# Patient Record
Sex: MALE | Race: WHITE | URBAN - METROPOLITAN AREA
[De-identification: names, ages, dates, MRNs, and addresses within clinical notes are randomized per-mention and may not be internally consistent; named-entity substitution may affect disease eponyms.]

---

## 2017-05-25 ENCOUNTER — EMERGENCY (EMERGENCY)
Facility: HOSPITAL | Age: 82
LOS: 0 days | Discharge: HOME | End: 2017-05-25
Attending: EMERGENCY MEDICINE | Admitting: EMERGENCY MEDICINE
Payer: MEDICARE

## 2017-05-25 VITALS
WEIGHT: 270.07 LBS | TEMPERATURE: 98 F | HEART RATE: 66 BPM | HEIGHT: 70 IN | SYSTOLIC BLOOD PRESSURE: 158 MMHG | DIASTOLIC BLOOD PRESSURE: 77 MMHG | OXYGEN SATURATION: 98 %

## 2017-05-25 VITALS
OXYGEN SATURATION: 98 % | SYSTOLIC BLOOD PRESSURE: 148 MMHG | RESPIRATION RATE: 17 BRPM | TEMPERATURE: 98 F | HEART RATE: 57 BPM | DIASTOLIC BLOOD PRESSURE: 73 MMHG

## 2017-05-25 DIAGNOSIS — R93.5 ABNORMAL FINDINGS ON DIAGNOSTIC IMAGING OF OTHER ABDOMINAL REGIONS, INCLUDING RETROPERITONEUM: ICD-10-CM

## 2017-05-25 DIAGNOSIS — R91.8 OTHER NONSPECIFIC ABNORMAL FINDING OF LUNG FIELD: ICD-10-CM

## 2017-05-25 DIAGNOSIS — M50.30 OTHER CERVICAL DISC DEGENERATION, UNSPECIFIED CERVICAL REGION: ICD-10-CM
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
## 2017-05-25 DIAGNOSIS — F03.90 UNSPECIFIED DEMENTIA, UNSPECIFIED SEVERITY, WITHOUT BEHAVIORAL DISTURBANCE, PSYCHOTIC DISTURBANCE, MOOD DISTURBANCE, AND ANXIETY: ICD-10-CM

## 2017-05-25 DIAGNOSIS — S20.211A CONTUSION OF RIGHT FRONT WALL OF THORAX, INITIAL ENCOUNTER: ICD-10-CM
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
## 2017-05-25 DIAGNOSIS — M54.9 DORSALGIA, UNSPECIFIED: ICD-10-CM

## 2017-05-25 LAB
ALBUMIN SERPL ELPH-MCNC: 3.7 G/DL — SIGNIFICANT CHANGE UP (ref 3.3–5)
ALP SERPL-CCNC: 51 U/L — SIGNIFICANT CHANGE UP (ref 40–120)
ALT FLD-CCNC: 32 U/L — SIGNIFICANT CHANGE UP (ref 12–78)
ANION GAP SERPL CALC-SCNC: 5 MMOL/L — SIGNIFICANT CHANGE UP (ref 5–17)
APTT BLD: 30.6 SEC — SIGNIFICANT CHANGE UP (ref 27.5–37.4)
AST SERPL-CCNC: 21 U/L — SIGNIFICANT CHANGE UP (ref 15–37)
BASOPHILS # BLD AUTO: 0.1 K/UL — SIGNIFICANT CHANGE UP (ref 0–0.2)
BASOPHILS NFR BLD AUTO: 1.1 % — SIGNIFICANT CHANGE UP (ref 0–2)
BILIRUB SERPL-MCNC: 0.6 MG/DL — SIGNIFICANT CHANGE UP (ref 0.2–1.2)
BUN SERPL-MCNC: 25 MG/DL — HIGH (ref 7–23)
CALCIUM SERPL-MCNC: 8.6 MG/DL — SIGNIFICANT CHANGE UP (ref 8.5–10.1)
CHLORIDE SERPL-SCNC: 106 MMOL/L — SIGNIFICANT CHANGE UP (ref 96–108)
CO2 SERPL-SCNC: 29 MMOL/L — SIGNIFICANT CHANGE UP (ref 22–31)
CREAT SERPL-MCNC: 1.3 MG/DL — SIGNIFICANT CHANGE UP (ref 0.5–1.3)
EOSINOPHIL # BLD AUTO: 0.1 K/UL — SIGNIFICANT CHANGE UP (ref 0–0.5)
EOSINOPHIL NFR BLD AUTO: 1.6 % — SIGNIFICANT CHANGE UP (ref 0–6)
GLUCOSE SERPL-MCNC: 100 MG/DL — HIGH (ref 70–99)
HCT VFR BLD CALC: 41.3 % — SIGNIFICANT CHANGE UP (ref 39–50)
HGB BLD-MCNC: 13.1 G/DL — SIGNIFICANT CHANGE UP (ref 13–17)
INR BLD: 1.07 RATIO — SIGNIFICANT CHANGE UP (ref 0.88–1.16)
LYMPHOCYTES # BLD AUTO: 2.7 K/UL — SIGNIFICANT CHANGE UP (ref 1–3.3)
LYMPHOCYTES # BLD AUTO: 33.9 % — SIGNIFICANT CHANGE UP (ref 13–44)
MCHC RBC-ENTMCNC: 30.2 PG — SIGNIFICANT CHANGE UP (ref 27–34)
MCHC RBC-ENTMCNC: 31.6 GM/DL — LOW (ref 32–36)
MCV RBC AUTO: 95.4 FL — SIGNIFICANT CHANGE UP (ref 80–100)
MONOCYTES # BLD AUTO: 0.8 K/UL — SIGNIFICANT CHANGE UP (ref 0–0.9)
MONOCYTES NFR BLD AUTO: 10.3 % — SIGNIFICANT CHANGE UP (ref 2–14)
NEUTROPHILS # BLD AUTO: 4.3 K/UL — SIGNIFICANT CHANGE UP (ref 1.8–7.4)
NEUTROPHILS NFR BLD AUTO: 53 % — SIGNIFICANT CHANGE UP (ref 43–77)
PLATELET # BLD AUTO: 138 K/UL — LOW (ref 150–400)
POTASSIUM SERPL-MCNC: 4.4 MMOL/L — SIGNIFICANT CHANGE UP (ref 3.5–5.3)
POTASSIUM SERPL-SCNC: 4.4 MMOL/L — SIGNIFICANT CHANGE UP (ref 3.5–5.3)
PROT SERPL-MCNC: 7.4 GM/DL — SIGNIFICANT CHANGE UP (ref 6–8.3)
PROTHROM AB SERPL-ACNC: 11.6 SEC — SIGNIFICANT CHANGE UP (ref 9.8–12.7)
RBC # BLD: 4.33 M/UL — SIGNIFICANT CHANGE UP (ref 4.2–5.8)
RBC # FLD: 13.2 % — SIGNIFICANT CHANGE UP (ref 10.3–14.5)
SODIUM SERPL-SCNC: 140 MMOL/L — SIGNIFICANT CHANGE UP (ref 135–145)
WBC # BLD: 8.1 K/UL — SIGNIFICANT CHANGE UP (ref 3.8–10.5)
WBC # FLD AUTO: 8.1 K/UL — SIGNIFICANT CHANGE UP (ref 3.8–10.5)

## 2017-05-25 PROCEDURE — 71250 CT THORAX DX C-: CPT | Mod: 26

## 2017-05-25 PROCEDURE — 93010 ELECTROCARDIOGRAM REPORT: CPT

## 2017-05-25 PROCEDURE — 72125 CT NECK SPINE W/O DYE: CPT | Mod: 26

## 2017-05-25 PROCEDURE — 70450 CT HEAD/BRAIN W/O DYE: CPT | Mod: 26

## 2017-05-25 PROCEDURE — 74176 CT ABD & PELVIS W/O CONTRAST: CPT | Mod: 26

## 2017-05-25 PROCEDURE — 99284 EMERGENCY DEPT VISIT MOD MDM: CPT | Mod: 25

## 2017-05-25 RX ORDER — KETOROLAC TROMETHAMINE 30 MG/ML
15 SYRINGE (ML) INJECTION ONCE
Qty: 0 | Refills: 0 | Status: DISCONTINUED | OUTPATIENT
Start: 2017-05-25 | End: 2017-05-25

## 2017-05-25 RX ORDER — TRAMADOL HYDROCHLORIDE 50 MG/1
1 TABLET ORAL
Qty: 9 | Refills: 0
Start: 2017-05-25 | End: 2017-05-28

## 2017-05-25 RX ADMIN — Medication 15 MILLIGRAM(S): at 05:30

## 2017-05-25 NOTE — ED PROVIDER NOTE - OBJECTIVE STATEMENT
89 yo male pw right sided lateral chest wall pain worsening with movment pt states he fell 5 days ago and pain is not improving much. he also states he has a ho rib fracture

## 2017-05-25 NOTE — ED PROVIDER NOTE - MUSCULOSKELETAL, MLM
Spine appears normal, range of motion is not limited, no muscle or joint tenderness right lateral mid rib pain, ttp, no deformity, no ecchymosis no swelling, no crepitus

## 2017-10-20 ENCOUNTER — APPOINTMENT (OUTPATIENT)
Dept: CARDIOLOGY | Facility: CLINIC | Age: 82
End: 2017-10-20
Payer: MEDICARE

## 2017-10-20 ENCOUNTER — NON-APPOINTMENT (OUTPATIENT)
Age: 82
End: 2017-10-20

## 2017-10-20 VITALS
OXYGEN SATURATION: 95 % | HEIGHT: 68 IN | DIASTOLIC BLOOD PRESSURE: 80 MMHG | HEART RATE: 60 BPM | BODY MASS INDEX: 41.52 KG/M2 | SYSTOLIC BLOOD PRESSURE: 170 MMHG | TEMPERATURE: 97.6 F | WEIGHT: 274 LBS

## 2017-10-20 DIAGNOSIS — Z87.891 PERSONAL HISTORY OF NICOTINE DEPENDENCE: ICD-10-CM

## 2017-10-20 DIAGNOSIS — Z87.39 PERSONAL HISTORY OF OTHER DISEASES OF THE MUSCULOSKELETAL SYSTEM AND CONNECTIVE TISSUE: ICD-10-CM

## 2017-10-20 DIAGNOSIS — K21.9 GASTRO-ESOPHAGEAL REFLUX DISEASE W/OUT ESOPHAGITIS: ICD-10-CM

## 2017-10-20 DIAGNOSIS — D35.2 BENIGN NEOPLASM OF PITUITARY GLAND: ICD-10-CM

## 2017-10-20 DIAGNOSIS — Z86.39 PERSONAL HISTORY OF OTHER ENDOCRINE, NUTRITIONAL AND METABOLIC DISEASE: ICD-10-CM

## 2017-10-20 DIAGNOSIS — Z87.898 PERSONAL HISTORY OF OTHER SPECIFIED CONDITIONS: ICD-10-CM

## 2017-10-20 PROCEDURE — 99205 OFFICE O/P NEW HI 60 MIN: CPT | Mod: 25

## 2017-10-20 PROCEDURE — 93000 ELECTROCARDIOGRAM COMPLETE: CPT

## 2017-10-20 RX ORDER — CHLORHEXIDINE GLUCONATE 4 %
1000 LIQUID (ML) TOPICAL DAILY
Refills: 0 | Status: ACTIVE | COMMUNITY

## 2017-10-20 RX ORDER — TRAMADOL HYDROCHLORIDE 50 MG/1
50 TABLET, COATED ORAL
Qty: 9 | Refills: 0 | Status: DISCONTINUED | COMMUNITY
Start: 2017-05-25 | End: 2017-10-20

## 2017-10-20 RX ORDER — PANTOPRAZOLE 40 MG/1
40 TABLET, DELAYED RELEASE ORAL DAILY
Qty: 90 | Refills: 1 | Status: ACTIVE | COMMUNITY

## 2017-10-20 RX ORDER — MULTIVIT-MIN/FOLIC/VIT K/LYCOP 400-300MCG
1000 TABLET ORAL DAILY
Refills: 0 | Status: ACTIVE | COMMUNITY

## 2017-10-23 PROBLEM — D35.2 PITUITARY MACROADENOMA: Status: RESOLVED | Noted: 2017-10-23 | Resolved: 2017-10-23

## 2017-10-23 PROBLEM — Z87.898 HISTORY OF MULTIPLE PULMONARY NODULES: Status: RESOLVED | Noted: 2017-10-23 | Resolved: 2017-10-23

## 2017-10-23 PROBLEM — Z87.891 FORMER SMOKER: Status: ACTIVE | Noted: 2017-10-20

## 2017-10-23 RX ORDER — FERROUS SULFATE 325(65) MG
325 (65 FE) TABLET ORAL DAILY
Refills: 0 | Status: ACTIVE | COMMUNITY

## 2018-01-12 ENCOUNTER — NON-APPOINTMENT (OUTPATIENT)
Age: 83
End: 2018-01-12

## 2018-01-12 ENCOUNTER — APPOINTMENT (OUTPATIENT)
Dept: CARDIOLOGY | Facility: CLINIC | Age: 83
End: 2018-01-12
Payer: MEDICARE

## 2018-01-12 VITALS
HEIGHT: 68 IN | TEMPERATURE: 97.9 F | DIASTOLIC BLOOD PRESSURE: 82 MMHG | RESPIRATION RATE: 14 BRPM | SYSTOLIC BLOOD PRESSURE: 150 MMHG | OXYGEN SATURATION: 97 % | HEART RATE: 64 BPM

## 2018-01-12 DIAGNOSIS — Z86.79 PERSONAL HISTORY OF OTHER DISEASES OF THE CIRCULATORY SYSTEM: ICD-10-CM

## 2018-01-12 PROCEDURE — 99215 OFFICE O/P EST HI 40 MIN: CPT | Mod: 25

## 2018-01-12 PROCEDURE — 93000 ELECTROCARDIOGRAM COMPLETE: CPT

## 2018-01-12 RX ORDER — LEVOFLOXACIN 250 MG/1
250 TABLET, FILM COATED ORAL
Qty: 5 | Refills: 0 | Status: COMPLETED | COMMUNITY
Start: 2017-07-18

## 2018-01-12 RX ORDER — ZOLPIDEM TARTRATE 10 MG/1
10 TABLET ORAL
Qty: 30 | Refills: 0 | Status: COMPLETED | COMMUNITY
Start: 2017-07-27

## 2018-01-24 ENCOUNTER — APPOINTMENT (OUTPATIENT)
Dept: CARDIOLOGY | Facility: CLINIC | Age: 83
End: 2018-01-24
Payer: MEDICARE

## 2018-01-24 ENCOUNTER — NON-APPOINTMENT (OUTPATIENT)
Age: 83
End: 2018-01-24

## 2018-01-24 VITALS — OXYGEN SATURATION: 95 % | SYSTOLIC BLOOD PRESSURE: 167 MMHG | DIASTOLIC BLOOD PRESSURE: 83 MMHG | HEART RATE: 67 BPM

## 2018-01-24 VITALS — WEIGHT: 274 LBS | BODY MASS INDEX: 41.52 KG/M2 | HEIGHT: 68 IN

## 2018-01-24 PROCEDURE — 99214 OFFICE O/P EST MOD 30 MIN: CPT | Mod: 25

## 2018-01-24 PROCEDURE — 93000 ELECTROCARDIOGRAM COMPLETE: CPT

## 2018-01-24 PROCEDURE — 93306 TTE W/DOPPLER COMPLETE: CPT

## 2018-01-24 RX ORDER — SIMVASTATIN 40 MG/1
40 TABLET, FILM COATED ORAL DAILY
Qty: 90 | Refills: 3 | Status: ACTIVE | COMMUNITY
Start: 2017-05-09

## 2018-01-24 RX ORDER — LEVOTHYROXINE SODIUM 0.1 MG/1
100 TABLET ORAL DAILY
Qty: 90 | Refills: 3 | Status: ACTIVE | COMMUNITY
Start: 2017-07-05

## 2018-01-26 ENCOUNTER — APPOINTMENT (OUTPATIENT)
Dept: CARDIOLOGY | Facility: CLINIC | Age: 83
End: 2018-01-26

## 2018-01-31 ENCOUNTER — APPOINTMENT (OUTPATIENT)
Dept: CARDIOLOGY | Facility: CLINIC | Age: 83
End: 2018-01-31
Payer: MEDICARE

## 2018-01-31 VITALS
WEIGHT: 275 LBS | HEART RATE: 74 BPM | DIASTOLIC BLOOD PRESSURE: 81 MMHG | SYSTOLIC BLOOD PRESSURE: 152 MMHG | HEIGHT: 68 IN | BODY MASS INDEX: 41.68 KG/M2 | OXYGEN SATURATION: 94 %

## 2018-01-31 PROCEDURE — 99214 OFFICE O/P EST MOD 30 MIN: CPT

## 2018-02-01 ENCOUNTER — APPOINTMENT (OUTPATIENT)
Dept: CARDIOLOGY | Facility: CLINIC | Age: 83
End: 2018-02-01

## 2018-02-12 ENCOUNTER — RX RENEWAL (OUTPATIENT)
Age: 83
End: 2018-02-12

## 2018-02-16 ENCOUNTER — APPOINTMENT (OUTPATIENT)
Dept: CARDIOLOGY | Facility: CLINIC | Age: 83
End: 2018-02-16

## 2018-02-16 ENCOUNTER — APPOINTMENT (OUTPATIENT)
Dept: CARDIOLOGY | Facility: CLINIC | Age: 83
End: 2018-02-16
Payer: MEDICARE

## 2018-02-16 PROCEDURE — 93880 EXTRACRANIAL BILAT STUDY: CPT

## 2018-02-23 NOTE — ED PROVIDER NOTE - FAMILY HISTORY
Anticoagulation Clinic - Remote Progress Note  REMOTE LAB    Indication: afib, s/p PPM  Referring Provider: Cj  Goal INR: 2-3  Initial Warfarin Start: December 2016  Current Drug Interactions: aspirin, citalopram, Synthroid, omeprazole daily, spironolactone, Glucosamine- Chondrotion  CHADS-VASc: 7 (age, gender, HTN, PVD, h/o TIA, HTN)  Bleed Risk: h/o AVM with bleed requiring hospitalization  Other: previously on Xarelto, with subsequent bleed (AVM) -- Dr. Campa in [place]; internal hemorrhoids     Diet: raw cabbage (2-3 heads per week), occasional cooked GLV  Alcohol: none  Tobacco: none  OTC Pain Medication: APAP    INR History:  Date 9/5 9/28 10/12 11/6 11/30 12/28 1/25 2/1 2/9   Total Weekly Dose 32.5 mg 32.5 mg 32.5 mg 32.5 mg 32.5 mg 32.5 mg 32.5mg 37.5 mg 35mg   INR 2.2 2.1 2.6 2.2 3.0 2.6 1.3 1.7 1.7   Notes  clinic; doxy clinic clinic clinic clinic clinic; cefdinir clinic; cefdinir cefdinir completed     Date 2/16 2/23          Total Weekly Dose 42.5 42.5          INR 1.8 2.3          Notes                Phone Interview:  Tablet Strength: pt has 5mg tablets  Current Maintenance Dose: 5mg daily   Patient Findings      Negatives Signs/symptoms of thrombosis, Signs/symptoms of bleeding, Laboratory test error suspected, Change in health, Change in alcohol use, Change in activity, Upcoming invasive procedure, Emergency department visit, Upcoming dental procedure, Missed doses, Extra doses, Change in medications, Change in diet/appetite, Hospital admission, Bruising, Other complaints     Comments Patient wrote down her diet this past week. She liked doing this and plans to continue. She realized she has been eating a lot of carbs, being a diabetic; she would prefer to incorporate more vegetables into her diet. She would like to start eating green beans, peas, coleslaw (low vit K foods). Provided her GLV information sheet at last encounter.     Patient will have 2 crowns placed at the dentist on  "2/26 and will take 4 clindamycin capsules prior to the procedure. Do not need to hold warfarin per DMD. Discussed can wet tea bag and place if notice bleeding after procedure.     Patient Contact Info: [phone]  Lab Contact Info: formerly [facility], [phone]    Plan:  1. INR remains subtherapeutic today at 1.8 despite boost last week. Since patient has had multiple subtherapeutic INRs recently, instructed her to increase her warfarin to 7.5mg on Mon/Fri, 5mg every other day. Will also \"boost\" dose on 2/17 to 7.5mg.    2. RTC in 1 week, 3/2 to ensure WNL. If still therapeutic- will consider pushing appt out to [id number] then once monthly.  3. Verbal and written information provided in the clinic. Ms. Cross RBV dosing instructions, expresses understanding with teach back, and she has no further questions at this time.    Jasmine Turner, PharmD  2/23/2018  1:46 PM    " No pertinent family history in first degree relatives

## 2018-03-14 ENCOUNTER — APPOINTMENT (OUTPATIENT)
Dept: CARDIOLOGY | Facility: CLINIC | Age: 83
End: 2018-03-14

## 2018-03-19 ENCOUNTER — MEDICATION RENEWAL (OUTPATIENT)
Age: 83
End: 2018-03-19

## 2018-03-20 ENCOUNTER — MEDICATION RENEWAL (OUTPATIENT)
Age: 83
End: 2018-03-20

## 2018-03-22 ENCOUNTER — APPOINTMENT (OUTPATIENT)
Dept: CARDIOLOGY | Facility: CLINIC | Age: 83
End: 2018-03-22
Payer: MEDICARE

## 2018-03-22 ENCOUNTER — NON-APPOINTMENT (OUTPATIENT)
Age: 83
End: 2018-03-22

## 2018-03-22 VITALS
TEMPERATURE: 97.7 F | OXYGEN SATURATION: 96 % | RESPIRATION RATE: 14 BRPM | HEIGHT: 68 IN | BODY MASS INDEX: 42.28 KG/M2 | WEIGHT: 279 LBS | HEART RATE: 72 BPM

## 2018-03-22 VITALS — DIASTOLIC BLOOD PRESSURE: 68 MMHG | SYSTOLIC BLOOD PRESSURE: 110 MMHG

## 2018-03-22 PROCEDURE — 93000 ELECTROCARDIOGRAM COMPLETE: CPT

## 2018-03-22 PROCEDURE — 99214 OFFICE O/P EST MOD 30 MIN: CPT | Mod: 25

## 2018-04-16 ENCOUNTER — RX RENEWAL (OUTPATIENT)
Age: 83
End: 2018-04-16

## 2018-07-13 ENCOUNTER — APPOINTMENT (OUTPATIENT)
Dept: CARDIOLOGY | Facility: CLINIC | Age: 83
End: 2018-07-13
Payer: MEDICARE

## 2018-07-13 ENCOUNTER — NON-APPOINTMENT (OUTPATIENT)
Age: 83
End: 2018-07-13

## 2018-07-13 VITALS — OXYGEN SATURATION: 93 % | HEART RATE: 65 BPM | SYSTOLIC BLOOD PRESSURE: 187 MMHG | DIASTOLIC BLOOD PRESSURE: 82 MMHG

## 2018-07-13 VITALS — HEIGHT: 68 IN | BODY MASS INDEX: 42.28 KG/M2 | WEIGHT: 279 LBS

## 2018-07-13 PROCEDURE — 99214 OFFICE O/P EST MOD 30 MIN: CPT | Mod: 25

## 2018-07-13 PROCEDURE — 93000 ELECTROCARDIOGRAM COMPLETE: CPT

## 2018-07-13 RX ORDER — MULTIVIT-MIN/FOLIC/VIT K/LYCOP 400-300MCG
50 MCG TABLET ORAL
Qty: 90 | Refills: 3 | Status: ACTIVE | COMMUNITY

## 2018-07-13 RX ORDER — ASPIRIN 325 MG/1
325 TABLET, FILM COATED ORAL DAILY
Qty: 60 | Refills: 1 | Status: ACTIVE | COMMUNITY

## 2018-07-16 ENCOUNTER — APPOINTMENT (OUTPATIENT)
Dept: RHEUMATOLOGY | Facility: CLINIC | Age: 83
End: 2018-07-16

## 2018-07-16 LAB
25(OH)D3 SERPL-MCNC: 39.2 NG/ML
ALBUMIN SERPL ELPH-MCNC: 4.6 G/DL
ALP BLD-CCNC: 41 U/L
ALT SERPL-CCNC: 39 U/L
ANION GAP SERPL CALC-SCNC: 14 MMOL/L
AST SERPL-CCNC: 40 U/L
BASOPHILS # BLD AUTO: 0.04 K/UL
BASOPHILS NFR BLD AUTO: 0.6 %
BILIRUB SERPL-MCNC: 0.5 MG/DL
BUN SERPL-MCNC: 29 MG/DL
CALCIUM SERPL-MCNC: 9.6 MG/DL
CHLORIDE SERPL-SCNC: 101 MMOL/L
CK SERPL-CCNC: 124 U/L
CO2 SERPL-SCNC: 24 MMOL/L
CREAT SERPL-MCNC: 1.17 MG/DL
EOSINOPHIL # BLD AUTO: 0.11 K/UL
EOSINOPHIL NFR BLD AUTO: 1.6 %
GLUCOSE SERPL-MCNC: 111 MG/DL
HBA1C MFR BLD HPLC: 5.8 %
HCT VFR BLD CALC: 39.7 %
HGB BLD-MCNC: 13.1 G/DL
IMM GRANULOCYTES NFR BLD AUTO: 0.1 %
LYMPHOCYTES # BLD AUTO: 1.86 K/UL
LYMPHOCYTES NFR BLD AUTO: 27.3 %
MAGNESIUM SERPL-MCNC: 2.3 MG/DL
MAN DIFF?: NORMAL
MCHC RBC-ENTMCNC: 30.1 PG
MCHC RBC-ENTMCNC: 33 GM/DL
MCV RBC AUTO: 91.3 FL
MONOCYTES # BLD AUTO: 0.67 K/UL
MONOCYTES NFR BLD AUTO: 9.8 %
NEUTROPHILS # BLD AUTO: 4.13 K/UL
NEUTROPHILS NFR BLD AUTO: 60.6 %
PLATELET # BLD AUTO: 150 K/UL
POTASSIUM SERPL-SCNC: 4.6 MMOL/L
PROT SERPL-MCNC: 8.1 G/DL
RBC # BLD: 4.35 M/UL
RBC # FLD: 14.1 %
SODIUM SERPL-SCNC: 139 MMOL/L
T3 SERPL-MCNC: 86 NG/DL
T4 SERPL-MCNC: 6.6 UG/DL
TSH SERPL-ACNC: 0.4 UIU/ML
WBC # FLD AUTO: 6.82 K/UL

## 2018-07-20 ENCOUNTER — APPOINTMENT (OUTPATIENT)
Dept: CARDIOLOGY | Facility: CLINIC | Age: 83
End: 2018-07-20
Payer: MEDICARE

## 2018-07-20 VITALS
WEIGHT: 276 LBS | OXYGEN SATURATION: 94 % | HEART RATE: 66 BPM | DIASTOLIC BLOOD PRESSURE: 75 MMHG | SYSTOLIC BLOOD PRESSURE: 160 MMHG | BODY MASS INDEX: 41.83 KG/M2 | HEIGHT: 68 IN

## 2018-07-20 PROBLEM — F03.90 UNSPECIFIED DEMENTIA WITHOUT BEHAVIORAL DISTURBANCE: Chronic | Status: ACTIVE | Noted: 2017-06-15

## 2018-07-20 PROCEDURE — 93000 ELECTROCARDIOGRAM COMPLETE: CPT

## 2018-07-20 PROCEDURE — 99214 OFFICE O/P EST MOD 30 MIN: CPT | Mod: 25

## 2018-08-20 ENCOUNTER — APPOINTMENT (OUTPATIENT)
Dept: CARDIOLOGY | Facility: CLINIC | Age: 83
End: 2018-08-20
Payer: MEDICARE

## 2018-08-20 ENCOUNTER — FORM ENCOUNTER (OUTPATIENT)
Age: 83
End: 2018-08-20

## 2018-08-20 VITALS
SYSTOLIC BLOOD PRESSURE: 128 MMHG | BODY MASS INDEX: 41.22 KG/M2 | WEIGHT: 272 LBS | HEIGHT: 68 IN | OXYGEN SATURATION: 92 % | HEART RATE: 67 BPM | DIASTOLIC BLOOD PRESSURE: 78 MMHG

## 2018-08-20 PROCEDURE — 99214 OFFICE O/P EST MOD 30 MIN: CPT

## 2018-08-21 ENCOUNTER — OUTPATIENT (OUTPATIENT)
Dept: OUTPATIENT SERVICES | Facility: HOSPITAL | Age: 83
LOS: 1 days | End: 2018-08-21
Payer: MEDICARE

## 2018-08-21 ENCOUNTER — APPOINTMENT (OUTPATIENT)
Dept: RADIOLOGY | Facility: CLINIC | Age: 83
End: 2018-08-21
Payer: MEDICARE

## 2018-08-21 DIAGNOSIS — Z00.8 ENCOUNTER FOR OTHER GENERAL EXAMINATION: ICD-10-CM

## 2018-08-21 PROCEDURE — 71046 X-RAY EXAM CHEST 2 VIEWS: CPT | Mod: 26

## 2018-08-21 PROCEDURE — 71046 X-RAY EXAM CHEST 2 VIEWS: CPT

## 2018-10-08 NOTE — ED PROVIDER NOTE - SKIN, MLM
Is This A New Presentation, Or A Follow-Up?: Skin Lesions How Severe Is Your Skin Lesion?: mild Have Your Skin Lesions Been Treated?: not been treated Skin normal color for race, warm, dry and intact. No evidence of rash.

## 2018-11-18 ENCOUNTER — RX RENEWAL (OUTPATIENT)
Age: 83
End: 2018-11-18

## 2019-07-17 ENCOUNTER — APPOINTMENT (OUTPATIENT)
Dept: CARDIOLOGY | Facility: CLINIC | Age: 84
End: 2019-07-17
Payer: MEDICARE

## 2019-07-17 ENCOUNTER — NON-APPOINTMENT (OUTPATIENT)
Age: 84
End: 2019-07-17

## 2019-07-17 VITALS
HEART RATE: 61 BPM | SYSTOLIC BLOOD PRESSURE: 137 MMHG | DIASTOLIC BLOOD PRESSURE: 87 MMHG | HEIGHT: 68 IN | WEIGHT: 263 LBS | BODY MASS INDEX: 39.86 KG/M2 | OXYGEN SATURATION: 93 %

## 2019-07-17 DIAGNOSIS — R06.09 OTHER FORMS OF DYSPNEA: ICD-10-CM

## 2019-07-17 PROCEDURE — 99214 OFFICE O/P EST MOD 30 MIN: CPT | Mod: 25

## 2019-07-17 PROCEDURE — 93000 ELECTROCARDIOGRAM COMPLETE: CPT

## 2019-07-17 RX ORDER — PREDNISONE 5 MG/1
5 TABLET ORAL
Qty: 90 | Refills: 3 | Status: ACTIVE | COMMUNITY

## 2019-07-25 ENCOUNTER — OTHER (OUTPATIENT)
Age: 84
End: 2019-07-25

## 2019-07-25 PROBLEM — R06.09 DOE (DYSPNEA ON EXERTION): Status: RESOLVED | Noted: 2017-10-20 | Resolved: 2018-03-22

## 2019-07-25 RX ORDER — RAMIPRIL 5 MG/1
5 CAPSULE ORAL
Refills: 0 | Status: DISCONTINUED | COMMUNITY
Start: 2017-04-30 | End: 2019-07-25

## 2019-07-25 NOTE — DISCUSSION/SUMMARY
[FreeTextEntry1] : Dizziness: Likely c/w vertigo and will refer to ENT for this. Less likely is side effect of increase ramapril. After ENT evaluation will reassess. \par HTN: Well controlled on current regime of medication. Low Sodium diet discussed.\par IGLESIAS: Combination of physical inactivity and DD.  Discussed increased activity to combat this and he will try.\par HL: Recent blood work great. \par Follow up 6 months

## 2019-07-25 NOTE — PHYSICAL EXAM
[General Appearance - Well Developed] : well developed [Normal Appearance] : normal appearance [General Appearance - Well Nourished] : well nourished [General Appearance - In No Acute Distress] : no acute distress [Normal Conjunctiva] : the conjunctiva exhibited no abnormalities [Eyelids - No Xanthelasma] : the eyelids demonstrated no xanthelasmas [Normal Oral Mucosa] : normal oral mucosa [No Oral Pallor] : no oral pallor [Normal Oropharynx] : normal oropharynx [Normal Jugular Venous A Waves Present] : normal jugular venous A waves present [Normal Jugular Venous V Waves Present] : normal jugular venous V waves present [Exaggerated Use Of Accessory Muscles For Inspiration] : no accessory muscle use [Auscultation Breath Sounds / Voice Sounds] : lungs were clear to auscultation bilaterally [Abdomen Soft] : soft [Abdomen Tenderness] : non-tender [Abdomen Mass (___ Cm)] : no abdominal mass palpated [Abnormal Walk] : normal gait [Nail Clubbing] : no clubbing of the fingernails [Cyanosis, Localized] : no localized cyanosis [Petechial Hemorrhages (___cm)] : no petechial hemorrhages [] : no rash [No Venous Stasis] : no venous stasis [Skin Lesions] : no skin lesions [Oriented To Time, Place, And Person] : oriented to person, place, and time [Impaired Insight] : insight and judgment were intact [Affect] : the affect was normal [Mood] : the mood was normal [Normal Rate] : normal [Rhythm Regular] : regular [Normal S1] : normal S1 [Normal S2] : normal S2 [S3] : no S3 [S4] : an S4 was heard [I] : a grade 1 [Right Carotid Bruit] : no bruit heard over the right carotid [Left Carotid Bruit] : no bruit heard over the left carotid [2+] : left 2+ [Right Femoral Bruit] : no bruit heard over the right femoral artery [Left Femoral Bruit] : no bruit heard over the left femoral artery [1+] : left 1+ [No Abnormalities] : the abdominal aorta was not enlarged and no bruit was heard [___ +] : bilateral [unfilled]U+ pretibial pitting edema

## 2019-07-25 NOTE — HISTORY OF PRESENT ILLNESS
[FreeTextEntry1] : c/o of dizziness with vertiginous characteristic. Denies chest pain, shortness of breath, palpitations, orthopnea, paroxysmal nocturnal dyspnea, claudication, or syncopal symptoms. Reviewed recent labs and results good.\par

## 2019-07-25 NOTE — REVIEW OF SYSTEMS
[Feeling Fatigued] : feeling fatigued [Blurry Vision] : blurred vision [Eyeglasses] : currently wearing eyeglasses [see HPI] : see HPI [Joint Pain] : joint pain [Joint Stiffness] : joint stiffness [Negative] : Heme/Lymph

## 2019-08-08 ENCOUNTER — APPOINTMENT (OUTPATIENT)
Dept: CARDIOLOGY | Facility: CLINIC | Age: 84
End: 2019-08-08
Payer: MEDICARE

## 2019-08-08 ENCOUNTER — NON-APPOINTMENT (OUTPATIENT)
Age: 84
End: 2019-08-08

## 2019-08-08 VITALS
BODY MASS INDEX: 39.86 KG/M2 | HEIGHT: 68 IN | WEIGHT: 263 LBS | SYSTOLIC BLOOD PRESSURE: 170 MMHG | RESPIRATION RATE: 15 BRPM | OXYGEN SATURATION: 93 % | HEART RATE: 66 BPM | DIASTOLIC BLOOD PRESSURE: 80 MMHG

## 2019-08-08 PROCEDURE — 93000 ELECTROCARDIOGRAM COMPLETE: CPT

## 2019-08-08 PROCEDURE — 99214 OFFICE O/P EST MOD 30 MIN: CPT | Mod: 25

## 2019-08-08 NOTE — REASON FOR VISIT
[Follow-Up - Clinic] : a clinic follow-up of [Dizziness] : dizziness [Dyspnea] : dyspnea [Medication Management] : Medication management [Hypertension] : hypertension

## 2019-08-09 RX ORDER — RAMIPRIL 2.5 MG/1
2.5 CAPSULE ORAL
Qty: 90 | Refills: 1 | Status: DISCONTINUED | COMMUNITY
Start: 2019-04-14 | End: 2019-08-09

## 2019-08-09 NOTE — HISTORY OF PRESENT ILLNESS
[FreeTextEntry1] : This is a 89 Y/O gentleman PMH: HTN, HLD, CAD/Stent 2011 ( [facility] ), Colon cancer, Hypothyroid, GERD, PMR followed with Rheumatology who presets today with CC of dizziness stating he feels his head spinning these symptoms occur randomly W/O any triggering/alleviating factors he denies CP/SOB/Palpitations/Syncope.  There is no evidence of orthostatic blood pressures upon exam today.  Does state at times symptoms are worse in the morning self limiting and usually kenia spontaneously.  \par \par Prior Ct Scan reviewed and I Have again today as on prior OV to F/U with  Neuro/Pulmonary W/U Prior CT  He had previously stated will he follow with PCP with regards to this However, has not done so as of yet.

## 2019-08-09 NOTE — PHYSICAL EXAM
[General Appearance - Well Developed] : well developed [Normal Appearance] : normal appearance [Well Groomed] : well groomed [No Deformities] : no deformities [General Appearance - Well Nourished] : well nourished [General Appearance - In No Acute Distress] : no acute distress [Normal Conjunctiva] : the conjunctiva exhibited no abnormalities [] : no respiratory distress [Exaggerated Use Of Accessory Muscles For Inspiration] : no accessory muscle use [Respiration, Rhythm And Depth] : normal respiratory rhythm and effort [Auscultation Breath Sounds / Voice Sounds] : lungs were clear to auscultation bilaterally [Heart Sounds] : normal S1 and S2 [Heart Rate And Rhythm] : heart rate and rhythm were normal [Abdomen Soft] : soft [Skin Color & Pigmentation] : normal skin color and pigmentation [Oriented To Time, Place, And Person] : oriented to person, place, and time [FreeTextEntry1] : No LE Edema

## 2019-08-09 NOTE — DISCUSSION/SUMMARY
[FreeTextEntry1] : CC dizziness W/O associated cardiac symptoms, no orthostatic blood pressures upon exam today,   Prior carotid US revealed no Hemodynamically significant  stenosis.  At this time will refer to Neurology for further W/U.  Have placed 24 HR Holter monitor,  Recent labs reviewed advised to increase PO fluids,\par \par Will suspend Ramipril and begin Norvasc 2.5 MG QD.  OV one week \par \par Will follow after Neuro Eval

## 2019-08-15 ENCOUNTER — NON-APPOINTMENT (OUTPATIENT)
Age: 84
End: 2019-08-15

## 2019-08-15 ENCOUNTER — APPOINTMENT (OUTPATIENT)
Dept: CARDIOLOGY | Facility: CLINIC | Age: 84
End: 2019-08-15
Payer: MEDICARE

## 2019-08-15 VITALS
WEIGHT: 263 LBS | TEMPERATURE: 97.9 F | DIASTOLIC BLOOD PRESSURE: 72 MMHG | RESPIRATION RATE: 14 BRPM | HEIGHT: 68 IN | BODY MASS INDEX: 39.86 KG/M2 | SYSTOLIC BLOOD PRESSURE: 150 MMHG | OXYGEN SATURATION: 94 % | HEART RATE: 68 BPM

## 2019-08-15 DIAGNOSIS — Z86.79 PERSONAL HISTORY OF OTHER DISEASES OF THE CIRCULATORY SYSTEM: ICD-10-CM

## 2019-08-15 DIAGNOSIS — I10 ESSENTIAL (PRIMARY) HYPERTENSION: ICD-10-CM

## 2019-08-15 DIAGNOSIS — Z86.39 PERSONAL HISTORY OF OTHER ENDOCRINE, NUTRITIONAL AND METABOLIC DISEASE: ICD-10-CM

## 2019-08-15 DIAGNOSIS — H81.10 BENIGN PAROXYSMAL VERTIGO, UNSPECIFIED EAR: ICD-10-CM

## 2019-08-15 PROCEDURE — 93000 ELECTROCARDIOGRAM COMPLETE: CPT

## 2019-08-15 PROCEDURE — 99214 OFFICE O/P EST MOD 30 MIN: CPT | Mod: 25

## 2019-08-15 NOTE — REASON FOR VISIT
[Follow-Up - Clinic] : a clinic follow-up of [Dizziness] : dizziness [Dyspnea] : dyspnea [Hypertension] : hypertension [Medication Management] : Medication management

## 2019-08-16 NOTE — HISTORY OF PRESENT ILLNESS
[FreeTextEntry1] : This is a 89 Y/O gentleman PMH: HTN, HLD, CAD/Stent 2011 ( [facility] ), Colon cancer, Hypothyroid, GERD, PMR followed with Rheumatology who presets today in follow up of dizziness. Ramipril was suspended Norvasc initiated dizziness abated pressure normotensive and he is tolerating Norvasc well.  \par \par All recent testing and Prior Ct Scan reviewed and  discussed with patient and family member which both patient and family member do not wish any further work up or treatment at this time. \par

## 2019-08-16 NOTE — PHYSICAL EXAM
[General Appearance - Well Developed] : well developed [Normal Appearance] : normal appearance [Well Groomed] : well groomed [General Appearance - Well Nourished] : well nourished [No Deformities] : no deformities [General Appearance - In No Acute Distress] : no acute distress [Normal Conjunctiva] : the conjunctiva exhibited no abnormalities [] : no respiratory distress [Respiration, Rhythm And Depth] : normal respiratory rhythm and effort [Exaggerated Use Of Accessory Muscles For Inspiration] : no accessory muscle use [Auscultation Breath Sounds / Voice Sounds] : lungs were clear to auscultation bilaterally [Heart Rate And Rhythm] : heart rate and rhythm were normal [Heart Sounds] : normal S1 and S2 [Abdomen Soft] : soft [Skin Color & Pigmentation] : normal skin color and pigmentation [Oriented To Time, Place, And Person] : oriented to person, place, and time [FreeTextEntry1] : No LE Edema

## 2019-08-16 NOTE — DISCUSSION/SUMMARY
[FreeTextEntry1] : Dizziness: Improved off ramipril.  Doing well with the addition of Norvasc.  No further changes at this time \par \par HTN: Controlled \par \par HLD: Will continue current medications and continue to monitor labs while on statin and Norvasc

## 2019-09-03 ENCOUNTER — RX CHANGE (OUTPATIENT)
Age: 84
End: 2019-09-03

## 2019-09-30 ENCOUNTER — MEDICATION RENEWAL (OUTPATIENT)
Age: 84
End: 2019-09-30

## 2019-09-30 ENCOUNTER — RX RENEWAL (OUTPATIENT)
Age: 84
End: 2019-09-30

## 2019-10-17 ENCOUNTER — EMERGENCY (EMERGENCY)
Facility: HOSPITAL | Age: 84
LOS: 0 days | Discharge: HOME | End: 2019-10-17
Attending: EMERGENCY MEDICINE
Payer: MEDICARE

## 2019-10-17 VITALS
TEMPERATURE: 97 F | SYSTOLIC BLOOD PRESSURE: 140 MMHG | OXYGEN SATURATION: 95 % | HEART RATE: 85 BPM | DIASTOLIC BLOOD PRESSURE: 73 MMHG | RESPIRATION RATE: 17 BRPM

## 2019-10-17 VITALS — HEIGHT: 70 IN | WEIGHT: 259.93 LBS

## 2019-10-17 DIAGNOSIS — E78.5 HYPERLIPIDEMIA, UNSPECIFIED: ICD-10-CM

## 2019-10-17 DIAGNOSIS — R53.83 OTHER FATIGUE: ICD-10-CM

## 2019-10-17 DIAGNOSIS — R05 COUGH: ICD-10-CM

## 2019-10-17 DIAGNOSIS — R53.81 OTHER MALAISE: ICD-10-CM

## 2019-10-17 DIAGNOSIS — R53.1 WEAKNESS: ICD-10-CM

## 2019-10-17 DIAGNOSIS — I10 ESSENTIAL (PRIMARY) HYPERTENSION: ICD-10-CM

## 2019-10-17 DIAGNOSIS — F03.90 UNSPECIFIED DEMENTIA WITHOUT BEHAVIORAL DISTURBANCE: ICD-10-CM

## 2019-10-17 LAB
ADD ON TEST-SPECIMEN IN LAB: SIGNIFICANT CHANGE UP
APPEARANCE UR: CLEAR — SIGNIFICANT CHANGE UP
BASOPHILS # BLD AUTO: 0.06 K/UL — SIGNIFICANT CHANGE UP (ref 0–0.2)
BASOPHILS NFR BLD AUTO: 0.6 % — SIGNIFICANT CHANGE UP (ref 0–2)
BILIRUB UR-MCNC: NEGATIVE — SIGNIFICANT CHANGE UP
COLOR SPEC: YELLOW — SIGNIFICANT CHANGE UP
DIFF PNL FLD: ABNORMAL
EOSINOPHIL # BLD AUTO: 0.14 K/UL — SIGNIFICANT CHANGE UP (ref 0–0.5)
EOSINOPHIL NFR BLD AUTO: 1.5 % — SIGNIFICANT CHANGE UP (ref 0–6)
GLUCOSE UR QL: NEGATIVE MG/DL — SIGNIFICANT CHANGE UP
HCT VFR BLD CALC: 41 % — SIGNIFICANT CHANGE UP (ref 39–50)
HGB BLD-MCNC: 13.3 G/DL — SIGNIFICANT CHANGE UP (ref 13–17)
IMM GRANULOCYTES NFR BLD AUTO: 0.4 % — SIGNIFICANT CHANGE UP (ref 0–1.5)
KETONES UR-MCNC: NEGATIVE — SIGNIFICANT CHANGE UP
LEUKOCYTE ESTERASE UR-ACNC: ABNORMAL
LYMPHOCYTES # BLD AUTO: 1.77 K/UL — SIGNIFICANT CHANGE UP (ref 1–3.3)
LYMPHOCYTES # BLD AUTO: 18.6 % — SIGNIFICANT CHANGE UP (ref 13–44)
MCHC RBC-ENTMCNC: 29.6 PG — SIGNIFICANT CHANGE UP (ref 27–34)
MCHC RBC-ENTMCNC: 32.4 GM/DL — SIGNIFICANT CHANGE UP (ref 32–36)
MCV RBC AUTO: 91.3 FL — SIGNIFICANT CHANGE UP (ref 80–100)
MONOCYTES # BLD AUTO: 0.99 K/UL — HIGH (ref 0–0.9)
MONOCYTES NFR BLD AUTO: 10.4 % — SIGNIFICANT CHANGE UP (ref 2–14)
NEUTROPHILS # BLD AUTO: 6.54 K/UL — SIGNIFICANT CHANGE UP (ref 1.8–7.4)
NEUTROPHILS NFR BLD AUTO: 68.5 % — SIGNIFICANT CHANGE UP (ref 43–77)
NITRITE UR-MCNC: NEGATIVE — SIGNIFICANT CHANGE UP
PH UR: 6.5 — SIGNIFICANT CHANGE UP (ref 5–8)
PLATELET # BLD AUTO: 209 K/UL — SIGNIFICANT CHANGE UP (ref 150–400)
PROT UR-MCNC: NEGATIVE MG/DL — SIGNIFICANT CHANGE UP
RBC # BLD: 4.49 M/UL — SIGNIFICANT CHANGE UP (ref 4.2–5.8)
RBC # FLD: 13.1 % — SIGNIFICANT CHANGE UP (ref 10.3–14.5)
SP GR SPEC: 1.01 — SIGNIFICANT CHANGE UP (ref 1.01–1.02)
UROBILINOGEN FLD QL: 1 MG/DL
WBC # BLD: 9.54 K/UL — SIGNIFICANT CHANGE UP (ref 3.8–10.5)
WBC # FLD AUTO: 9.54 K/UL — SIGNIFICANT CHANGE UP (ref 3.8–10.5)

## 2019-10-17 PROCEDURE — 84484 ASSAY OF TROPONIN QUANT: CPT

## 2019-10-17 PROCEDURE — 93005 ELECTROCARDIOGRAM TRACING: CPT

## 2019-10-17 PROCEDURE — 87086 URINE CULTURE/COLONY COUNT: CPT

## 2019-10-17 PROCEDURE — 36415 COLL VENOUS BLD VENIPUNCTURE: CPT

## 2019-10-17 PROCEDURE — 80053 COMPREHEN METABOLIC PANEL: CPT

## 2019-10-17 PROCEDURE — 99285 EMERGENCY DEPT VISIT HI MDM: CPT | Mod: 25

## 2019-10-17 PROCEDURE — 71046 X-RAY EXAM CHEST 2 VIEWS: CPT | Mod: 26

## 2019-10-17 PROCEDURE — 93010 ELECTROCARDIOGRAM REPORT: CPT

## 2019-10-17 PROCEDURE — 71046 X-RAY EXAM CHEST 2 VIEWS: CPT

## 2019-10-17 PROCEDURE — 83735 ASSAY OF MAGNESIUM: CPT

## 2019-10-17 PROCEDURE — 84100 ASSAY OF PHOSPHORUS: CPT

## 2019-10-17 PROCEDURE — 81001 URINALYSIS AUTO W/SCOPE: CPT

## 2019-10-17 PROCEDURE — 85025 COMPLETE CBC W/AUTO DIFF WBC: CPT

## 2019-10-17 PROCEDURE — 99285 EMERGENCY DEPT VISIT HI MDM: CPT

## 2019-10-17 RX ORDER — SODIUM CHLORIDE 9 MG/ML
1000 INJECTION INTRAMUSCULAR; INTRAVENOUS; SUBCUTANEOUS ONCE
Refills: 0 | Status: COMPLETED | OUTPATIENT
Start: 2019-10-17 | End: 2019-10-17

## 2019-10-17 RX ADMIN — SODIUM CHLORIDE 2000 MILLILITER(S): 9 INJECTION INTRAMUSCULAR; INTRAVENOUS; SUBCUTANEOUS at 11:33

## 2019-10-17 NOTE — ED PROVIDER NOTE - OBJECTIVE STATEMENT
89 y/o male with PMHx of HTN, HLD, and Dementia presents to the ED BIB daughter c/o weakness and fatigue x 1.5 week. Pt states he has been having "violent" cough x 1 week. As per daughter at bedside, pt is moving more slowly and not per baseline. Pt was seen in  2 days ago and had a CXR and a flu test but the results were negative and pt was put on Erythromycin. Denies fever, chest pain, diarrhea, burning urination, headache, or changes in speech. Pt traveled to 3 different states within the last month. Pt ambulates with a cane. Allergic to Penicillin. PCP: Dr. Tony Goldsmith. Full HPI is unobtainable due to a pt being a poor historian.

## 2019-10-17 NOTE — ED PROVIDER NOTE - INTERPRETATION
normal sinus rhythm, Normal axis, Normal OH interval and QRS complex. There are no acute ischemic ST or T-wave changes./sinus rhythm with 1st degree AV block

## 2019-10-17 NOTE — ED ADULT NURSE NOTE - NSIMPLEMENTINTERV_GEN_ALL_ED
Implemented All Universal Safety Interventions:  [employer] to call system. Call bell, personal items and telephone within reach. Instruct patient to call for assistance. Room bathroom lighting operational. Non-slip footwear when patient is off stretcher. Physically safe environment: no spills, clutter or unnecessary equipment. Stretcher in lowest position, wheels locked, appropriate side rails in place.

## 2019-10-17 NOTE — ED PROVIDER NOTE - CARE PLAN
Principal Discharge DX:	Weakness  Secondary Diagnosis:	Fatigue  Secondary Diagnosis:	Malaise and fatigue  Secondary Diagnosis:	Cough

## 2019-10-17 NOTE — ED STATDOCS - NS_ ATTENDINGSCRIBEDETAILS _ED_A_ED_FT
I, Lavon Torres MD, performed the initial face to face bedside interview with this patient regarding history of present illness and determined that the patient should be seen in the main ED.  The history, was documented by the scribe in my presence and I attest to the accuracy of the documentation.

## 2019-10-17 NOTE — ED STATDOCS - PROGRESS NOTE DETAILS
[facility] for ED attending, Dr. Torres: 89 y/o male with PMHx of HTN, HLD, and dementia presents to the ED c/o +weakness and +fatigue x weeks. 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 But within the last two weeks he has been too weak to even get up out of bed. Pt also notes +cough. Denies CP or SOB. Allergic to penicillin. PCP: Dr. Tony Goldsmith. Will send pt to main ED for further evaluation.

## 2019-10-17 NOTE — ED PROVIDER NOTE - PATIENT PORTAL LINK FT
You can access the FollowMyHealth Patient Portal offered by [facility] by registering at the following website: [email or web address]. By joining [employer]’s FollowMyHealth portal, you will also be able to view your health information using other applications (apps) compatible with our system.

## 2019-10-17 NOTE — ED PROVIDER NOTE - PROGRESS NOTE DETAILS
pt known to me as an outpt as well.  c/o generalized fatigue, weakness x 1.5 weeks after a bout of frequent travel and URI/cough.  feels much better after 1L NS.  labs essentially normal.  was started on zpack 2d ago in UC for cough, which possibly has partially treated a UTI based on results?? will send UCx.  pt would like to go home, daughter here with him agrees that we can dc home since he is feeling better.  strict return precautions discussed at length and they can return to see me here in ED tomorrow should anything persist or worsen.  pt is freely ambulating around ED and would like to go home. will dc.  finish abx.

## 2019-10-17 NOTE — ED ADULT TRIAGE NOTE - CHIEF COMPLAINT QUOTE
Pt reports worsening fatigue over past week. Denies acute injury, pain, or other symptoms at this time. No focal weakness.

## 2019-10-17 NOTE — ED ADULT NURSE NOTE - OBJECTIVE STATEMENT
Patient brought in by daughter for weakness and fatigue for 1 and a half weeks. Patient also reports cough for 1 week Daughter concerned for dehydration. Patient was seen at urgent care and was given PO abx for cough.

## 2019-10-18 LAB
CULTURE RESULTS: SIGNIFICANT CHANGE UP
SPECIMEN SOURCE: SIGNIFICANT CHANGE UP

## 2019-11-04 ENCOUNTER — MEDICATION RENEWAL (OUTPATIENT)
Age: 84
End: 2019-11-04

## 2020-03-02 ENCOUNTER — INPATIENT (INPATIENT)
Facility: HOSPITAL | Age: 85
LOS: 13 days | DRG: 207 | End: 2020-03-16
Attending: INTERNAL MEDICINE | Admitting: INTERNAL MEDICINE
Payer: MEDICARE

## 2020-03-02 VITALS — WEIGHT: 220.02 LBS

## 2020-03-02 DIAGNOSIS — E78.5 HYPERLIPIDEMIA, UNSPECIFIED: ICD-10-CM

## 2020-03-02 DIAGNOSIS — R74.0 NONSPECIFIC ELEVATION OF LEVELS OF TRANSAMINASE AND LACTIC ACID DEHYDROGENASE [LDH]: ICD-10-CM

## 2020-03-02 DIAGNOSIS — J18.9 PNEUMONIA, UNSPECIFIED ORGANISM: ICD-10-CM

## 2020-03-02 DIAGNOSIS — Z95.5 PRESENCE OF CORONARY ANGIOPLASTY IMPLANT AND GRAFT: ICD-10-CM

## 2020-03-02 DIAGNOSIS — E03.9 HYPOTHYROIDISM, UNSPECIFIED: ICD-10-CM

## 2020-03-02 DIAGNOSIS — Z85.038 PERSONAL HISTORY OF OTHER MALIGNANT NEOPLASM OF LARGE INTESTINE: ICD-10-CM

## 2020-03-02 DIAGNOSIS — Z51.5 ENCOUNTER FOR PALLIATIVE CARE: ICD-10-CM

## 2020-03-02 DIAGNOSIS — A41.89 OTHER SPECIFIED SEPSIS: ICD-10-CM

## 2020-03-02 DIAGNOSIS — J98.11 ATELECTASIS: ICD-10-CM

## 2020-03-02 DIAGNOSIS — I13.10 HYPERTENSIVE HEART AND CHRONIC KIDNEY DISEASE WITHOUT HEART FAILURE, WITH STAGE 1 THROUGH STAGE 4 CHRONIC KIDNEY DISEASE, OR UNSPECIFIED CHRONIC KIDNEY DISEASE: ICD-10-CM

## 2020-03-02 DIAGNOSIS — D69.6 THROMBOCYTOPENIA, UNSPECIFIED: ICD-10-CM

## 2020-03-02 DIAGNOSIS — J80 ACUTE RESPIRATORY DISTRESS SYNDROME: ICD-10-CM

## 2020-03-02 DIAGNOSIS — Z79.82 LONG TERM (CURRENT) USE OF ASPIRIN: ICD-10-CM

## 2020-03-02 DIAGNOSIS — N18.3 CHRONIC KIDNEY DISEASE, STAGE 3 (MODERATE): ICD-10-CM

## 2020-03-02 DIAGNOSIS — M33.20 POLYMYOSITIS, ORGAN INVOLVEMENT UNSPECIFIED: ICD-10-CM

## 2020-03-02 DIAGNOSIS — D72.810 LYMPHOCYTOPENIA: ICD-10-CM

## 2020-03-02 DIAGNOSIS — F03.90 UNSPECIFIED DEMENTIA WITHOUT BEHAVIORAL DISTURBANCE: ICD-10-CM

## 2020-03-02 DIAGNOSIS — N17.0 ACUTE KIDNEY FAILURE WITH TUBULAR NECROSIS: ICD-10-CM

## 2020-03-02 DIAGNOSIS — R57.9 SHOCK, UNSPECIFIED: ICD-10-CM

## 2020-03-02 DIAGNOSIS — R09.02 HYPOXEMIA: ICD-10-CM

## 2020-03-02 DIAGNOSIS — Z66 DO NOT RESUSCITATE: ICD-10-CM

## 2020-03-02 DIAGNOSIS — Z88.0 ALLERGY STATUS TO PENICILLIN: ICD-10-CM

## 2020-03-02 DIAGNOSIS — Z79.52 LONG TERM (CURRENT) USE OF SYSTEMIC STEROIDS: ICD-10-CM

## 2020-03-02 DIAGNOSIS — I25.10 ATHEROSCLEROTIC HEART DISEASE OF NATIVE CORONARY ARTERY WITHOUT ANGINA PECTORIS: ICD-10-CM

## 2020-03-02 DIAGNOSIS — B97.29 OTHER CORONAVIRUS AS THE CAUSE OF DISEASES CLASSIFIED ELSEWHERE: ICD-10-CM

## 2020-03-02 DIAGNOSIS — E86.0 DEHYDRATION: ICD-10-CM

## 2020-03-02 DIAGNOSIS — E43 UNSPECIFIED SEVERE PROTEIN-CALORIE MALNUTRITION: ICD-10-CM

## 2020-03-02 DIAGNOSIS — Z79.899 OTHER LONG TERM (CURRENT) DRUG THERAPY: ICD-10-CM

## 2020-03-02 DIAGNOSIS — R91.1 SOLITARY PULMONARY NODULE: ICD-10-CM

## 2020-03-02 DIAGNOSIS — R65.21 SEVERE SEPSIS WITH SEPTIC SHOCK: ICD-10-CM

## 2020-03-02 LAB
ALBUMIN SERPL ELPH-MCNC: 3.8 G/DL — SIGNIFICANT CHANGE UP (ref 3.3–5)
ALP SERPL-CCNC: 50 U/L — SIGNIFICANT CHANGE UP (ref 40–120)
ALT FLD-CCNC: 86 U/L — HIGH (ref 12–78)
ANION GAP SERPL CALC-SCNC: 4 MMOL/L — LOW (ref 5–17)
APPEARANCE UR: CLEAR — SIGNIFICANT CHANGE UP
AST SERPL-CCNC: 91 U/L — HIGH (ref 15–37)
BASOPHILS # BLD AUTO: 0.04 K/UL — SIGNIFICANT CHANGE UP (ref 0–0.2)
BASOPHILS NFR BLD AUTO: 0.6 % — SIGNIFICANT CHANGE UP (ref 0–2)
BILIRUB SERPL-MCNC: 0.6 MG/DL — SIGNIFICANT CHANGE UP (ref 0.2–1.2)
BILIRUB UR-MCNC: NEGATIVE — SIGNIFICANT CHANGE UP
BUN SERPL-MCNC: 13 MG/DL — SIGNIFICANT CHANGE UP (ref 7–23)
CALCIUM SERPL-MCNC: 8.9 MG/DL — SIGNIFICANT CHANGE UP (ref 8.5–10.1)
CHLORIDE SERPL-SCNC: 102 MMOL/L — SIGNIFICANT CHANGE UP (ref 96–108)
CO2 SERPL-SCNC: 31 MMOL/L — SIGNIFICANT CHANGE UP (ref 22–31)
COLOR SPEC: YELLOW — SIGNIFICANT CHANGE UP
CREAT SERPL-MCNC: 1.33 MG/DL — HIGH (ref 0.5–1.3)
DIFF PNL FLD: ABNORMAL
EOSINOPHIL # BLD AUTO: 0.08 K/UL — SIGNIFICANT CHANGE UP (ref 0–0.5)
EOSINOPHIL NFR BLD AUTO: 1.1 % — SIGNIFICANT CHANGE UP (ref 0–6)
GLUCOSE SERPL-MCNC: 109 MG/DL — HIGH (ref 70–99)
GLUCOSE UR QL: NEGATIVE MG/DL — SIGNIFICANT CHANGE UP
HCT VFR BLD CALC: 43.9 % — SIGNIFICANT CHANGE UP (ref 39–50)
HGB BLD-MCNC: 13.9 G/DL — SIGNIFICANT CHANGE UP (ref 13–17)
IMM GRANULOCYTES NFR BLD AUTO: 0.4 % — SIGNIFICANT CHANGE UP (ref 0–1.5)
KETONES UR-MCNC: NEGATIVE — SIGNIFICANT CHANGE UP
LACTATE SERPL-SCNC: 1.5 MMOL/L — SIGNIFICANT CHANGE UP (ref 0.7–2)
LEUKOCYTE ESTERASE UR-ACNC: ABNORMAL
LYMPHOCYTES # BLD AUTO: 1.25 K/UL — SIGNIFICANT CHANGE UP (ref 1–3.3)
LYMPHOCYTES # BLD AUTO: 17.4 % — SIGNIFICANT CHANGE UP (ref 13–44)
MCHC RBC-ENTMCNC: 29.1 PG — SIGNIFICANT CHANGE UP (ref 27–34)
MCHC RBC-ENTMCNC: 31.7 GM/DL — LOW (ref 32–36)
MCV RBC AUTO: 92 FL — SIGNIFICANT CHANGE UP (ref 80–100)
MONOCYTES # BLD AUTO: 1.13 K/UL — HIGH (ref 0–0.9)
MONOCYTES NFR BLD AUTO: 15.7 % — HIGH (ref 2–14)
NEUTROPHILS # BLD AUTO: 4.67 K/UL — SIGNIFICANT CHANGE UP (ref 1.8–7.4)
NEUTROPHILS NFR BLD AUTO: 64.8 % — SIGNIFICANT CHANGE UP (ref 43–77)
NITRITE UR-MCNC: NEGATIVE — SIGNIFICANT CHANGE UP
PH UR: 7 — SIGNIFICANT CHANGE UP (ref 5–8)
PLATELET # BLD AUTO: 121 K/UL — LOW (ref 150–400)
POTASSIUM SERPL-MCNC: 3.8 MMOL/L — SIGNIFICANT CHANGE UP (ref 3.5–5.3)
POTASSIUM SERPL-SCNC: 3.8 MMOL/L — SIGNIFICANT CHANGE UP (ref 3.5–5.3)
PROT SERPL-MCNC: 7.8 GM/DL — SIGNIFICANT CHANGE UP (ref 6–8.3)
PROT UR-MCNC: 15 MG/DL
RAPID RVP RESULT: SIGNIFICANT CHANGE UP
RBC # BLD: 4.77 M/UL — SIGNIFICANT CHANGE UP (ref 4.2–5.8)
RBC # FLD: 14.2 % — SIGNIFICANT CHANGE UP (ref 10.3–14.5)
SODIUM SERPL-SCNC: 137 MMOL/L — SIGNIFICANT CHANGE UP (ref 135–145)
SP GR SPEC: 1.01 — SIGNIFICANT CHANGE UP (ref 1.01–1.02)
UROBILINOGEN FLD QL: NEGATIVE MG/DL — SIGNIFICANT CHANGE UP
WBC # BLD: 7.2 K/UL — SIGNIFICANT CHANGE UP (ref 3.8–10.5)
WBC # FLD AUTO: 7.2 K/UL — SIGNIFICANT CHANGE UP (ref 3.8–10.5)

## 2020-03-02 PROCEDURE — 94003 VENT MGMT INPAT SUBQ DAY: CPT

## 2020-03-02 PROCEDURE — 80048 BASIC METABOLIC PNL TOTAL CA: CPT

## 2020-03-02 PROCEDURE — 83874 ASSAY OF MYOGLOBIN: CPT

## 2020-03-02 PROCEDURE — 86235 NUCLEAR ANTIGEN ANTIBODY: CPT

## 2020-03-02 PROCEDURE — 36600 WITHDRAWAL OF ARTERIAL BLOOD: CPT

## 2020-03-02 PROCEDURE — 94640 AIRWAY INHALATION TREATMENT: CPT

## 2020-03-02 PROCEDURE — 83615 LACTATE (LD) (LDH) ENZYME: CPT

## 2020-03-02 PROCEDURE — 87040 BLOOD CULTURE FOR BACTERIA: CPT

## 2020-03-02 PROCEDURE — 93010 ELECTROCARDIOGRAM REPORT: CPT

## 2020-03-02 PROCEDURE — C9113: CPT

## 2020-03-02 PROCEDURE — 84155 ASSAY OF PROTEIN SERUM: CPT

## 2020-03-02 PROCEDURE — 80053 COMPREHEN METABOLIC PANEL: CPT

## 2020-03-02 PROCEDURE — 83605 ASSAY OF LACTIC ACID: CPT

## 2020-03-02 PROCEDURE — 87633 RESP VIRUS 12-25 TARGETS: CPT

## 2020-03-02 PROCEDURE — 84145 PROCALCITONIN (PCT): CPT

## 2020-03-02 PROCEDURE — 80076 HEPATIC FUNCTION PANEL: CPT

## 2020-03-02 PROCEDURE — 71045 X-RAY EXAM CHEST 1 VIEW: CPT | Mod: 26

## 2020-03-02 PROCEDURE — 84300 ASSAY OF URINE SODIUM: CPT

## 2020-03-02 PROCEDURE — 97116 GAIT TRAINING THERAPY: CPT | Mod: GP

## 2020-03-02 PROCEDURE — 87486 CHLMYD PNEUM DNA AMP PROBE: CPT

## 2020-03-02 PROCEDURE — 94760 N-INVAS EAR/PLS OXIMETRY 1: CPT

## 2020-03-02 PROCEDURE — U0001: CPT

## 2020-03-02 PROCEDURE — 83516 IMMUNOASSAY NONANTIBODY: CPT

## 2020-03-02 PROCEDURE — 97530 THERAPEUTIC ACTIVITIES: CPT | Mod: GP

## 2020-03-02 PROCEDURE — 82607 VITAMIN B-12: CPT

## 2020-03-02 PROCEDURE — 93306 TTE W/DOPPLER COMPLETE: CPT

## 2020-03-02 PROCEDURE — 82550 ASSAY OF CK (CPK): CPT

## 2020-03-02 PROCEDURE — 94002 VENT MGMT INPAT INIT DAY: CPT

## 2020-03-02 PROCEDURE — 82803 BLOOD GASES ANY COMBINATION: CPT

## 2020-03-02 PROCEDURE — 87798 DETECT AGENT NOS DNA AMP: CPT

## 2020-03-02 PROCEDURE — 85025 COMPLETE CBC W/AUTO DIFF WBC: CPT

## 2020-03-02 PROCEDURE — 85027 COMPLETE CBC AUTOMATED: CPT

## 2020-03-02 PROCEDURE — 82728 ASSAY OF FERRITIN: CPT

## 2020-03-02 PROCEDURE — 73718 MRI LOWER EXTREMITY W/O DYE: CPT | Mod: 50

## 2020-03-02 PROCEDURE — 87070 CULTURE OTHR SPECIMN AEROBIC: CPT

## 2020-03-02 PROCEDURE — 86431 RHEUMATOID FACTOR QUANT: CPT

## 2020-03-02 PROCEDURE — 82962 GLUCOSE BLOOD TEST: CPT

## 2020-03-02 PROCEDURE — 74176 CT ABD & PELVIS W/O CONTRAST: CPT

## 2020-03-02 PROCEDURE — 36415 COLL VENOUS BLD VENIPUNCTURE: CPT

## 2020-03-02 PROCEDURE — 83735 ASSAY OF MAGNESIUM: CPT

## 2020-03-02 PROCEDURE — 97163 PT EVAL HIGH COMPLEX 45 MIN: CPT | Mod: GP

## 2020-03-02 PROCEDURE — 99223 1ST HOSP IP/OBS HIGH 75: CPT | Mod: GC

## 2020-03-02 PROCEDURE — 86255 FLUORESCENT ANTIBODY SCREEN: CPT

## 2020-03-02 PROCEDURE — 84165 PROTEIN E-PHORESIS SERUM: CPT

## 2020-03-02 PROCEDURE — 71045 X-RAY EXAM CHEST 1 VIEW: CPT

## 2020-03-02 PROCEDURE — 86140 C-REACTIVE PROTEIN: CPT

## 2020-03-02 PROCEDURE — 87581 M.PNEUMON DNA AMP PROBE: CPT

## 2020-03-02 PROCEDURE — 86039 ANTINUCLEAR ANTIBODIES (ANA): CPT

## 2020-03-02 PROCEDURE — 83935 ASSAY OF URINE OSMOLALITY: CPT

## 2020-03-02 PROCEDURE — 84100 ASSAY OF PHOSPHORUS: CPT

## 2020-03-02 PROCEDURE — 71250 CT THORAX DX C-: CPT

## 2020-03-02 PROCEDURE — 83520 IMMUNOASSAY QUANT NOS NONAB: CPT

## 2020-03-02 PROCEDURE — 94660 CPAP INITIATION&MGMT: CPT

## 2020-03-02 PROCEDURE — 87449 NOS EACH ORGANISM AG IA: CPT

## 2020-03-02 PROCEDURE — 87450: CPT

## 2020-03-02 PROCEDURE — 85652 RBC SED RATE AUTOMATED: CPT

## 2020-03-02 RX ORDER — ASPIRIN/CALCIUM CARB/MAGNESIUM 324 MG
1 TABLET ORAL
Qty: 0 | Refills: 0 | DISCHARGE

## 2020-03-02 RX ORDER — CEFTRIAXONE 500 MG/1
1000 INJECTION, POWDER, FOR SOLUTION INTRAMUSCULAR; INTRAVENOUS ONCE
Refills: 0 | Status: COMPLETED | OUTPATIENT
Start: 2020-03-02 | End: 2020-03-02

## 2020-03-02 RX ORDER — SIMVASTATIN 20 MG/1
1 TABLET, FILM COATED ORAL
Qty: 0 | Refills: 0 | DISCHARGE

## 2020-03-02 RX ORDER — AZITHROMYCIN 500 MG/1
500 TABLET, FILM COATED ORAL DAILY
Refills: 0 | Status: COMPLETED | OUTPATIENT
Start: 2020-03-02 | End: 2020-03-05

## 2020-03-02 RX ORDER — AMLODIPINE BESYLATE 2.5 MG/1
2.5 TABLET ORAL DAILY
Refills: 0 | Status: DISCONTINUED | OUTPATIENT
Start: 2020-03-02 | End: 2020-03-10

## 2020-03-02 RX ORDER — LEVOTHYROXINE SODIUM 125 MCG
50 TABLET ORAL DAILY
Refills: 0 | Status: DISCONTINUED | OUTPATIENT
Start: 2020-03-02 | End: 2020-03-10

## 2020-03-02 RX ORDER — ASPIRIN/CALCIUM CARB/MAGNESIUM 324 MG
81 TABLET ORAL DAILY
Refills: 0 | Status: DISCONTINUED | OUTPATIENT
Start: 2020-03-02 | End: 2020-03-15

## 2020-03-02 RX ORDER — AZITHROMYCIN 500 MG/1
500 TABLET, FILM COATED ORAL ONCE
Refills: 0 | Status: COMPLETED | OUTPATIENT
Start: 2020-03-02 | End: 2020-03-02

## 2020-03-02 RX ORDER — SIMVASTATIN 20 MG/1
40 TABLET, FILM COATED ORAL AT BEDTIME
Refills: 0 | Status: DISCONTINUED | OUTPATIENT
Start: 2020-03-02 | End: 2020-03-08

## 2020-03-02 RX ORDER — HEPARIN SODIUM 5000 [USP'U]/ML
5000 INJECTION INTRAVENOUS; SUBCUTANEOUS THREE TIMES A DAY
Refills: 0 | Status: DISCONTINUED | OUTPATIENT
Start: 2020-03-02 | End: 2020-03-15

## 2020-03-02 RX ORDER — AMLODIPINE BESYLATE 2.5 MG/1
1 TABLET ORAL
Qty: 0 | Refills: 0 | DISCHARGE

## 2020-03-02 RX ORDER — ACETAMINOPHEN 500 MG
650 TABLET ORAL ONCE
Refills: 0 | Status: COMPLETED | OUTPATIENT
Start: 2020-03-02 | End: 2020-03-02

## 2020-03-02 RX ORDER — SODIUM CHLORIDE 9 MG/ML
1000 INJECTION INTRAMUSCULAR; INTRAVENOUS; SUBCUTANEOUS
Refills: 0 | Status: DISCONTINUED | OUTPATIENT
Start: 2020-03-02 | End: 2020-03-04

## 2020-03-02 RX ORDER — CEFTRIAXONE 500 MG/1
1000 INJECTION, POWDER, FOR SOLUTION INTRAMUSCULAR; INTRAVENOUS EVERY 24 HOURS
Refills: 0 | Status: DISCONTINUED | OUTPATIENT
Start: 2020-03-02 | End: 2020-03-02

## 2020-03-02 RX ORDER — PANTOPRAZOLE SODIUM 20 MG/1
1 TABLET, DELAYED RELEASE ORAL
Qty: 0 | Refills: 0 | DISCHARGE

## 2020-03-02 RX ORDER — CEFTRIAXONE 500 MG/1
1000 INJECTION, POWDER, FOR SOLUTION INTRAMUSCULAR; INTRAVENOUS EVERY 24 HOURS
Refills: 0 | Status: DISCONTINUED | OUTPATIENT
Start: 2020-03-02 | End: 2020-03-07

## 2020-03-02 RX ORDER — PANTOPRAZOLE SODIUM 20 MG/1
40 TABLET, DELAYED RELEASE ORAL
Refills: 0 | Status: DISCONTINUED | OUTPATIENT
Start: 2020-03-02 | End: 2020-03-10

## 2020-03-02 RX ORDER — SODIUM CHLORIDE 9 MG/ML
3100 INJECTION, SOLUTION INTRAVENOUS ONCE
Refills: 0 | Status: COMPLETED | OUTPATIENT
Start: 2020-03-02 | End: 2020-03-02

## 2020-03-02 RX ORDER — LEVOTHYROXINE SODIUM 125 MCG
1 TABLET ORAL
Qty: 0 | Refills: 0 | DISCHARGE

## 2020-03-02 RX ADMIN — AZITHROMYCIN 500 MILLIGRAM(S): 500 TABLET, FILM COATED ORAL at 19:59

## 2020-03-02 RX ADMIN — Medication 650 MILLIGRAM(S): at 18:14

## 2020-03-02 RX ADMIN — CEFTRIAXONE 1000 MILLIGRAM(S): 500 INJECTION, POWDER, FOR SOLUTION INTRAMUSCULAR; INTRAVENOUS at 18:58

## 2020-03-02 RX ADMIN — AZITHROMYCIN 255 MILLIGRAM(S): 500 TABLET, FILM COATED ORAL at 18:59

## 2020-03-02 RX ADMIN — SODIUM CHLORIDE 3100 MILLILITER(S): 9 INJECTION, SOLUTION INTRAVENOUS at 19:14

## 2020-03-02 RX ADMIN — SODIUM CHLORIDE 3100 MILLILITER(S): 9 INJECTION, SOLUTION INTRAVENOUS at 18:14

## 2020-03-02 RX ADMIN — Medication 650 MILLIGRAM(S): at 20:55

## 2020-03-02 NOTE — H&P ADULT - NEUROLOGICAL DETAILS
sensation intact/cranial nerves intact/alert and oriented x 3/normal strength/responds to pain/responds to verbal commands

## 2020-03-02 NOTE — H&P ADULT - NSHPPHYSICALEXAM_GEN_ALL_CORE
Vital Signs Last 24 Hrs  T(C): 37.4 (02 Mar 2020 22:01), Max: 37.7 (02 Mar 2020 17:39)  T(F): 99.4 (02 Mar 2020 22:01), Max: 99.8 (02 Mar 2020 17:39)  HR: 70 (02 Mar 2020 22:01) (62 - 70)  BP: 138/60 (02 Mar 2020 22:01) (121/49 - 141/55)  BP(mean): 69 (02 Mar 2020 20:56) (69 - 78)  RR: 18 (02 Mar 2020 22:01) (16 - 21)  SpO2: 98% (02 Mar 2020 22:01) (84% - 100%)

## 2020-03-02 NOTE — ED PROVIDER NOTE - OBJECTIVE STATEMENT
89 y/o male with a PMHx of HTN, HLD, dementia presents to the ED c/o generalized weakness and lethargy since yesterday worsening since onset. +fever. +decreased PO intake. Pt noted to by hypoxic to 89% on room air on arrival to ED. +sick contact- pt's son recently had the flu. +cough x2 days. +SOB today. Denies dysuria. PMD- Dr. Goldsmith. Cardio- Dr. Kuhn. History obtained from daughter at bedside. History limited secondary to dementia.

## 2020-03-02 NOTE — H&P ADULT - NSICDXPASTMEDICALHX_GEN_ALL_CORE_FT
PAST MEDICAL HISTORY:  Colon cancer     Dementia     HLD (hyperlipidemia)     HTN (hypertension)     Hypothyroidism     S/P coronary artery stent placement

## 2020-03-02 NOTE — H&P ADULT - ATTENDING COMMENTS
89 year old male patient with history as previously stated found to be hypoxic     -Admit to [facility]    #Acute hypoxemic respiratory failure  -DDX: PNA, pleural effusion, obstructive lung disease,   -pneumonia high on the differential   -give supplemental oxygen  -on Rocephin and Zithromax  -get morning echo    #Acute kidney injury  -likely prerenal   -encourage oral hydration    #HTN  - amlodipine    #CAD  -on asa and statin  #HLD  -on statin    #Dementia  -supportive care    #Weakness/ Debility  -PT consult  -f/u B12, folate  #Advanced Directives  -full code

## 2020-03-02 NOTE — ED ADULT TRIAGE NOTE - CHIEF COMPLAINT QUOTE
pt presents to ED due to not feeling well for the past few days more lethargic unsure of any fevers daughter at bedside

## 2020-03-02 NOTE — ED PROVIDER NOTE - NS_ ATTENDINGSCRIBEDETAILS _ED_A_ED_FT
I, Nicolás Ardon MD,  performed the initial face to face bedside interview with this patient regarding history of present illness, review of symptoms and relevant past medical, social and family history.  I completed an independent physical examination.    The history, relevant review of systems, past medical and surgical history, medical decision making, and physical examination was documented by the scribe in my presence and I attest to the accuracy of the documentation.

## 2020-03-02 NOTE — ED ADULT NURSE NOTE - NSIMPLEMENTINTERV_GEN_ALL_ED
Implemented All Universal Safety Interventions:  [place] to call system. Call bell, personal items and telephone within reach. Instruct patient to call for assistance. Room bathroom lighting operational. Non-slip footwear when patient is off stretcher. Physically safe environment: no spills, clutter or unnecessary equipment. Stretcher in lowest position, wheels locked, appropriate side rails in place.

## 2020-03-02 NOTE — H&P ADULT - RS GEN PE MLT RESP DETAILS PC
diminished breath sounds, R/rales/respirations non-labored/diminished breath sounds, L/airway patent/good air movement

## 2020-03-02 NOTE — H&P ADULT - HISTORY OF PRESENT ILLNESS
89 yo M with Hx of HTN, HLD, CAD s/p cardiac stent placement, colon ca s/p resection, hypothyroidism presents with 3 day history of progressively worsening weakness and lethargy with associated dry cough. Pt lives at home with his daughter Tracy who is at bedside providing bulk of history. She notes patient generally sleeps alot during the day and easily falls asleep but notes that he was very lethargic requiring more effort to be aroused.  Daughter describes pt having a sick appearance. Pt has had decreased appetite. Pt has 2 recent sick contacts, son had flu and family co-worker was very ill recently. Pt denies any nausea, vomiting or diarrhea. He is constipated intermittently, for which he has taken milk of magnesia. Denies any recent hospitalizations. Pt last travelled in Jan 2020 to [place] and has plans to travel there this week.     In the ED pt was hypoxic to 89% on RA. Pt was given 3.1 L bolus of LR and IV ceftriaxone/azithromycin.

## 2020-03-02 NOTE — H&P ADULT - ASSESSMENT
[age] yo M as described above presenting with 3 day history of weakness/lethargy with associated dry cough and decreased PO intake admitted 89 yo M as described above presenting with 3 day history of weakness/lethargy with associated dry cough and decreased PO intake admitted for acute hypoxic respiratory failure secondary to Community Acquired Pneumonia and CRAIG 2/2 poor PO intake.    #Acute hypoxic respiratory failure  #CAP  - O2 sat 89% on RA on initial presentation  - Supplemental O2  - Lactate 1.5  - s/p 3.1 L LR Bolus  - s/p IV Ceftriaxone/azithromycin  - Continue IV Ceftriaxone/Azithromycin daily  - F/U Sputum culture  - F/U Blood cultures x 2  - F/U CXR- official read    #CRAIG  - Cr 1.33 on admission (baseline Cr 1.0-1.2)  - gentle IV hydration  - encourage PO intake  - Trend Cr    #Weakness  #Lethargy  - F/U AM serum B12  - F/U AM ECHO screen for heart failure, pulmonary HTN  - Consider outpatient sleep study    #Transaminitis  - AST/ALT mildly elevated  - Trend LFTs  - Consider adjusting medication regimen 89 yo M as described above presenting with 3 day history of weakness/lethargy with associated dry cough and decreased PO intake admitted for acute hypoxic respiratory failure secondary to Community Acquired Pneumonia and CRAIG 2/2 poor PO intake.    #Acute hypoxic respiratory failure  #CAP  - O2 sat 89% on RA on initial presentation  - Supplemental O2  - Lactate 1.5  - s/p 3.1 L LR Bolus  - s/p IV Ceftriaxone/azithromycin  - Continue IV Ceftriaxone/Azithromycin daily  - F/U Sputum culture  - F/U Blood cultures x 2  - F/U CXR- official read    #CRAIG  - Cr 1.33 on admission (baseline Cr 1.0-1.2)  - gentle IV hydration  - encourage PO intake  - Trend Cr    #Weakness  #Lethargy  - Likely due to acute infxn  - F/U AM serum B12  - F/U AM ECHO screen for heart failure, pulmonary HTN  - Consider outpatient sleep study in light of daytime somnolence, body habitus and hx of HTN  - F/U PT    #Transaminitis  - AST/ALT mildly elevated  - Trend LFTs  - Consider adjusting medication regimen    #Thrombocytopenia   PLT 121K on admission  - Trend PLT    #HTN  - Cont amlodipine    #CAD  - Cont ASA, statin    #Hypothyroidism  - Cont Synthroid    #DVT PPx  - IMPROVE Score: 2  - Heparin SQ    #Advanced directives  - HCP: daughter Aleja  - FULL CODE

## 2020-03-02 NOTE — ED ADULT NURSE REASSESSMENT NOTE - NS ED NURSE REASSESS COMMENT FT1
pt used urinal and was moving around in bed. pt desaturated to 84% on room air, 3L NC applied to pt, pt currently saturating 99% on 3L NC. Dr cristobal made aware of pt status. pt current respirations even and regular

## 2020-03-02 NOTE — ED ADULT NURSE REASSESSMENT NOTE - NS ED NURSE REASSESS COMMENT FT1
pt received in bed, alert and oriented to self, place and year, but forgetful. Dr cristobal aware of pt status. Cardiac monitoring in progress, family at pt bedside

## 2020-03-02 NOTE — ED ADULT NURSE NOTE - OBJECTIVE STATEMENT
pt brought to ED for evaluation of fever and not feeling well x 3 days. pt known contact with family member who has flu

## 2020-03-03 DIAGNOSIS — I25.10 ATHEROSCLEROTIC HEART DISEASE OF NATIVE CORONARY ARTERY WITHOUT ANGINA PECTORIS: ICD-10-CM

## 2020-03-03 DIAGNOSIS — J18.9 PNEUMONIA, UNSPECIFIED ORGANISM: ICD-10-CM

## 2020-03-03 DIAGNOSIS — E03.9 HYPOTHYROIDISM, UNSPECIFIED: ICD-10-CM

## 2020-03-03 DIAGNOSIS — J96.01 ACUTE RESPIRATORY FAILURE WITH HYPOXIA: ICD-10-CM

## 2020-03-03 DIAGNOSIS — I10 ESSENTIAL (PRIMARY) HYPERTENSION: ICD-10-CM

## 2020-03-03 DIAGNOSIS — D69.6 THROMBOCYTOPENIA, UNSPECIFIED: ICD-10-CM

## 2020-03-03 DIAGNOSIS — R53.1 WEAKNESS: ICD-10-CM

## 2020-03-03 DIAGNOSIS — R74.0 NONSPECIFIC ELEVATION OF LEVELS OF TRANSAMINASE AND LACTIC ACID DEHYDROGENASE [LDH]: ICD-10-CM

## 2020-03-03 DIAGNOSIS — N17.9 ACUTE KIDNEY FAILURE, UNSPECIFIED: ICD-10-CM

## 2020-03-03 PROBLEM — E78.5 HYPERLIPIDEMIA, UNSPECIFIED: Chronic | Status: ACTIVE | Noted: 2019-10-17

## 2020-03-03 LAB
ALBUMIN SERPL ELPH-MCNC: 3.2 G/DL — LOW (ref 3.3–5)
ALP SERPL-CCNC: 42 U/L — SIGNIFICANT CHANGE UP (ref 40–120)
ALT FLD-CCNC: 73 U/L — SIGNIFICANT CHANGE UP (ref 12–78)
ANION GAP SERPL CALC-SCNC: 8 MMOL/L — SIGNIFICANT CHANGE UP (ref 5–17)
AST SERPL-CCNC: 77 U/L — HIGH (ref 15–37)
BILIRUB SERPL-MCNC: 0.5 MG/DL — SIGNIFICANT CHANGE UP (ref 0.2–1.2)
BUN SERPL-MCNC: 12 MG/DL — SIGNIFICANT CHANGE UP (ref 7–23)
CALCIUM SERPL-MCNC: 8.6 MG/DL — SIGNIFICANT CHANGE UP (ref 8.5–10.1)
CHLORIDE SERPL-SCNC: 102 MMOL/L — SIGNIFICANT CHANGE UP (ref 96–108)
CO2 SERPL-SCNC: 24 MMOL/L — SIGNIFICANT CHANGE UP (ref 22–31)
CREAT SERPL-MCNC: 0.96 MG/DL — SIGNIFICANT CHANGE UP (ref 0.5–1.3)
GLUCOSE SERPL-MCNC: 83 MG/DL — SIGNIFICANT CHANGE UP (ref 70–99)
HCT VFR BLD CALC: 39.7 % — SIGNIFICANT CHANGE UP (ref 39–50)
HGB BLD-MCNC: 13 G/DL — SIGNIFICANT CHANGE UP (ref 13–17)
MCHC RBC-ENTMCNC: 29.4 PG — SIGNIFICANT CHANGE UP (ref 27–34)
MCHC RBC-ENTMCNC: 32.7 GM/DL — SIGNIFICANT CHANGE UP (ref 32–36)
MCV RBC AUTO: 89.8 FL — SIGNIFICANT CHANGE UP (ref 80–100)
PLATELET # BLD AUTO: 90 K/UL — LOW (ref 150–400)
POTASSIUM SERPL-MCNC: 3.6 MMOL/L — SIGNIFICANT CHANGE UP (ref 3.5–5.3)
POTASSIUM SERPL-SCNC: 3.6 MMOL/L — SIGNIFICANT CHANGE UP (ref 3.5–5.3)
PROT SERPL-MCNC: 7.1 GM/DL — SIGNIFICANT CHANGE UP (ref 6–8.3)
RBC # BLD: 4.42 M/UL — SIGNIFICANT CHANGE UP (ref 4.2–5.8)
RBC # FLD: 14.2 % — SIGNIFICANT CHANGE UP (ref 10.3–14.5)
SODIUM SERPL-SCNC: 134 MMOL/L — LOW (ref 135–145)
VIT B12 SERPL-MCNC: 424 PG/ML — SIGNIFICANT CHANGE UP (ref 232–1245)
WBC # BLD: 5.83 K/UL — SIGNIFICANT CHANGE UP (ref 3.8–10.5)
WBC # FLD AUTO: 5.83 K/UL — SIGNIFICANT CHANGE UP (ref 3.8–10.5)

## 2020-03-03 PROCEDURE — 74176 CT ABD & PELVIS W/O CONTRAST: CPT | Mod: 26

## 2020-03-03 PROCEDURE — 99233 SBSQ HOSP IP/OBS HIGH 50: CPT | Mod: GC

## 2020-03-03 PROCEDURE — 71250 CT THORAX DX C-: CPT | Mod: 26

## 2020-03-03 PROCEDURE — 93306 TTE W/DOPPLER COMPLETE: CPT | Mod: 26

## 2020-03-03 RX ORDER — IPRATROPIUM/ALBUTEROL SULFATE 18-103MCG
3 AEROSOL WITH ADAPTER (GRAM) INHALATION EVERY 6 HOURS
Refills: 0 | Status: DISCONTINUED | OUTPATIENT
Start: 2020-03-03 | End: 2020-03-09

## 2020-03-03 RX ORDER — ACETAMINOPHEN 500 MG
650 TABLET ORAL EVERY 6 HOURS
Refills: 0 | Status: DISCONTINUED | OUTPATIENT
Start: 2020-03-03 | End: 2020-03-10

## 2020-03-03 RX ADMIN — SIMVASTATIN 40 MILLIGRAM(S): 20 TABLET, FILM COATED ORAL at 21:21

## 2020-03-03 RX ADMIN — SODIUM CHLORIDE 75 MILLILITER(S): 9 INJECTION INTRAMUSCULAR; INTRAVENOUS; SUBCUTANEOUS at 01:05

## 2020-03-03 RX ADMIN — Medication 81 MILLIGRAM(S): at 12:13

## 2020-03-03 RX ADMIN — Medication 50 MICROGRAM(S): at 05:11

## 2020-03-03 RX ADMIN — Medication 5 MILLIGRAM(S): at 05:12

## 2020-03-03 RX ADMIN — HEPARIN SODIUM 5000 UNIT(S): 5000 INJECTION INTRAVENOUS; SUBCUTANEOUS at 12:13

## 2020-03-03 RX ADMIN — PANTOPRAZOLE SODIUM 40 MILLIGRAM(S): 20 TABLET, DELAYED RELEASE ORAL at 05:10

## 2020-03-03 RX ADMIN — CEFTRIAXONE 1000 MILLIGRAM(S): 500 INJECTION, POWDER, FOR SOLUTION INTRAMUSCULAR; INTRAVENOUS at 21:21

## 2020-03-03 RX ADMIN — HEPARIN SODIUM 5000 UNIT(S): 5000 INJECTION INTRAVENOUS; SUBCUTANEOUS at 05:11

## 2020-03-03 RX ADMIN — HEPARIN SODIUM 5000 UNIT(S): 5000 INJECTION INTRAVENOUS; SUBCUTANEOUS at 21:19

## 2020-03-03 RX ADMIN — AZITHROMYCIN 500 MILLIGRAM(S): 500 TABLET, FILM COATED ORAL at 12:13

## 2020-03-03 RX ADMIN — AMLODIPINE BESYLATE 2.5 MILLIGRAM(S): 2.5 TABLET ORAL at 05:12

## 2020-03-03 RX ADMIN — CEFTRIAXONE 1000 MILLIGRAM(S): 500 INJECTION, POWDER, FOR SOLUTION INTRAMUSCULAR; INTRAVENOUS at 01:04

## 2020-03-03 RX ADMIN — Medication 650 MILLIGRAM(S): at 05:10

## 2020-03-03 NOTE — PROGRESS NOTE ADULT - PROBLEM SELECTOR PLAN 3
- Cr 1.33 on admission (baseline Cr 1.0-1.2)  - gentle IV hydration  - encourage PO intake  - Trend Cr - Cr 1.33 on admission (baseline Cr 1.0-1.2) currently 0.96  - D/C IVF at 11am  - encourage PO intake  - Trend Cr - Cr 1.33 on admission (baseline Cr 1.0-1.2) currently 0.96  - D/C IVF at 12pm  - encourage PO intake  - Trend Cr -Resolved   - Cr 1.33 on admission (baseline Cr 1.0-1.2) currently 0.96  - D/C IVF at 12pm  - encourage PO intake -Resolved; likely prerenal 2/2 hypovolemia from dehydration  - Cr 1.33 on admission (baseline Cr 1.0-1.2) currently 0.96  - D/C IVF at 12pm  - encourage PO intake

## 2020-03-03 NOTE — PROGRESS NOTE ADULT - PROBLEM SELECTOR PLAN 4
- Likely due to acute infxn  - F/U AM serum B12  - F/U AM ECHO screen for heart failure, pulmonary HTN  - Consider outpatient sleep study in light of daytime somnolence, body habitus and hx of HTN  - F/U PT - Likely due to acute infxn  - F/U AM serum B12  - Consider outpatient sleep study in light of daytime somnolence, body habitus and hx of HTN  - PT Consult ordered - Likely due to acute infxn  - F/U AM serum B12  - Consider outpatient sleep study in light of daytime somnolence, body habitus  - PT Consult ordered - Likely due to acute infxn  -  Vitamin B12, Serum: 424 pg/mL this AM  - Consider outpatient sleep study in light of daytime somnolence, body habitus  - PT Consult ordered - Likely due to acute infxn  -  Vitamin B12, Serum: 424 pg/mL this AM  - Consider outpatient sleep study in light of daytime somnolence, body habitus  - PT Consult- recs MARCELO on discharge

## 2020-03-03 NOTE — PHYSICAL THERAPY INITIAL EVALUATION ADULT - MODALITIES TREATMENT COMMENTS
pt left seated in chair post Eval; chair alarm, O2 4L/min nc, IV intact; RN Meagan present; callbell in reach; pt instructed not to get up alone; call nursing for assist; nikki well; denied pain

## 2020-03-03 NOTE — PROGRESS NOTE ADULT - ASSESSMENT
89 yo M as described above presenting with 3 day history of weakness/lethargy with associated nonproductive cough and decreased PO intake admitted for acute hypoxic respiratory failure secondary to Community Acquired Pneumonia and CRAIG secondary to poor PO intake.              #DVT PPx  - IMPROVE Score: 2  - Heparin SQ    #Advanced directives  - HCP: daughter Aleja  - FULL CODE 89 yo M as described above presenting with 3 day history of weakness/lethargy with associated nonproductive cough and decreased PO intake admitted for acute hypoxic respiratory failure secondary to suspected Community Acquired Pneumonia and CRAIG secondary to poor PO intake.        #DVT PPx  - IMPROVE Score: 2  - Heparin SQ    #Advanced directives  - HCP: daughter Aleja  - FULL CODE

## 2020-03-03 NOTE — PROGRESS NOTE ADULT - SUBJECTIVE AND OBJECTIVE BOX
CHIEF COMPLAINT: 89 yo M with Hx of HTN, HLD, CAD s/p cardiac stent placement, colon ca s/p resection, hypothyroidism presents to the ED following 3 days hx of non productive cough, and progressively worsening lethargy and weakness. Brought in by daughter after noticing he appeared "sick" and more "difficult to arouse than usual". Recent sick contacts with Son (flu) and coworker (unknown illness). Most recent travel to [place] in Jan 2020 with plans for travel back this week. Denies N/V, pain.   SUBJECTIVE:     REVIEW OF SYSTEMS:    CONSTITUTIONAL: No weakness, fevers or chills  EYES/ENT: No visual changes;  No vertigo or throat pain   NECK: No pain or stiffness  RESPIRATORY: No cough, wheezing, hemoptysis; No shortness of breath  CARDIOVASCULAR: No chest pain or palpitations  GASTROINTESTINAL: No abdominal or epigastric pain. No nausea, vomiting, or hematemesis; No diarrhea or constipation. No melena or hematochezia.  GENITOURINARY: No dysuria, frequency or hematuria  NEUROLOGICAL: No numbness or weakness  SKIN: No itching, burning, rashes, or lesions   All other review of systems is negative unless indicated above    Vital Signs Last 24 Hrs  T(C): 38.3 (03 Mar 2020 06:46), Max: 39.2 (03 Mar 2020 05:01)  T(F): 101 (03 Mar 2020 06:46), Max: 102.5 (03 Mar 2020 05:01)  HR: 72 (03 Mar 2020 05:01) (62 - 72)  BP: 153/63 (03 Mar 2020 05:01) (121/49 - 153/63)  BP(mean): 69 (02 Mar 2020 20:56) (69 - 78)  RR: 18 (03 Mar 2020 05:01) (16 - 21)  SpO2: 98% (03 Mar 2020 05:01) (84% - 100%)      PHYSICAL EXAM:    Constitutional: NAD, awake and alert, obese  HEENT: PERR, EOMI, Normal Hearing, MMM , poor dentition  Neck: Soft and supple, No LAD, No JVD  Respiratory: +crackles BLL, no sob or wheezing  Cardiovascular: S1 and S2, regular rate and rhythm, no Murmurs, gallops or rubs  Gastrointestinal: Bowel Sounds present, soft, nontender, nondistended, no guarding, no rebound  Extremities: No peripheral edema  Vascular: 2+ peripheral pulses  Neurological: A/O x 3, no focal deficits  Musculoskeletal: 5/5 strength b/l upper and lower extremities  Skin: No rashes    MEDICATIONS:  MEDICATIONS  (STANDING):  amLODIPine   Tablet 2.5 milliGRAM(s) Oral daily  aspirin  chewable 81 milliGRAM(s) Oral daily  azithromycin   Tablet 500 milliGRAM(s) Oral daily  cefTRIAXone Injectable. 1000 milliGRAM(s) IV Push every 24 hours  heparin  Injectable 5000 Unit(s) SubCutaneous three times a day  levothyroxine 50 MICROGram(s) Oral daily  pantoprazole    Tablet 40 milliGRAM(s) Oral before breakfast  predniSONE   Tablet 5 milliGRAM(s) Oral daily  simvastatin 40 milliGRAM(s) Oral at bedtime  sodium chloride 0.9%. 1000 milliLiter(s) (75 mL/Hr) IV Continuous <Continuous>      LABS: All Labs Reviewed:                        13.0   5.83  )-----------( 90       ( 03 Mar 2020 06:29 )             39.7     03-03    134<L>  |  102  |  12  ----------------------------<  83  3.6   |  24  |  0.96    Ca    8.6      03 Mar 2020 06:29    TPro  7.1  /  Alb  3.2<L>  /  TBili  0.5  /  DBili  x   /  AST  77<H>  /  ALT  73  /  AlkPhos  42  03-03    PT/INR - ( 02 Mar 2020 20:08 )   PT: 13.8 sec;   INR: 1.24 ratio         PTT - ( 02 Mar 2020 20:08 )  PTT:31.6 sec    Rapid RVP Result: NotDetec:       Blood Culture: Pending    RADIOLOGY/EKG:    CXR - pending    ECHO - pending            DVT PPX:     ADVANCED DIRECTIVE: FULL CODE CHIEF COMPLAINT: 89 yo M with Hx of HTN, HLD, CAD s/p cardiac stent placement, colon ca s/p resection, hypothyroidism presents to the ED following 3 days hx of non productive cough, and progressively worsening lethargy and weakness. Brought in by daughter after noticing he appeared "sick" and more "difficult to arouse than usual". Recent sick contacts with Son (flu) and coworker (unknown illness). Most recent travel to [place] in Jan 2020 with plans for travel back this week. Denies N/V, pain.   SUBJECTIVE:     REVIEW OF SYSTEMS:    CONSTITUTIONAL: No weakness, fevers or chills  EYES/ENT: No visual changes;  No vertigo or throat pain   NECK: No pain or stiffness  RESPIRATORY: No cough, wheezing, hemoptysis; No shortness of breath  CARDIOVASCULAR: No chest pain or palpitations  GASTROINTESTINAL: No abdominal or epigastric pain. No nausea, vomiting, or hematemesis; No diarrhea or constipation. No melena or hematochezia.  GENITOURINARY: No dysuria, frequency or hematuria  NEUROLOGICAL: No numbness or weakness  SKIN: No itching, burning, rashes, or lesions   All other review of systems is negative unless indicated above    Vital Signs Last 24 Hrs  T(C): 38.3 (03 Mar 2020 06:46), Max: 39.2 (03 Mar 2020 05:01)  T(F): 101 (03 Mar 2020 06:46), Max: 102.5 (03 Mar 2020 05:01)  HR: 72 (03 Mar 2020 05:01) (62 - 72)  BP: 153/63 (03 Mar 2020 05:01) (121/49 - 153/63)  BP(mean): 69 (02 Mar 2020 20:56) (69 - 78)  RR: 18 (03 Mar 2020 05:01) (16 - 21)  SpO2: 98% (03 Mar 2020 05:01) (84% - 100%)      PHYSICAL EXAM:    Constitutional: NAD, awake and alert, obese  HEENT: PERR, EOMI, Normal Hearing, MMM , poor dentition  Neck: Soft and supple, No LAD, No JVD  Respiratory: +crackles BLL, + wheezing  Cardiovascular: S1 and S2, regular rate and rhythm, no Murmurs, gallops or rubs  Gastrointestinal: Bowel Sounds present, soft, nontender, nondistended, no guarding, no rebound  Extremities: No peripheral edema  Vascular: 2+ peripheral pulses  Neurological: A/O x 3, no focal deficits  Musculoskeletal: 5/5 strength b/l upper and lower extremities  Skin: No rashes    MEDICATIONS:  MEDICATIONS  (STANDING):  amLODIPine   Tablet 2.5 milliGRAM(s) Oral daily  aspirin  chewable 81 milliGRAM(s) Oral daily  azithromycin   Tablet 500 milliGRAM(s) Oral daily  cefTRIAXone Injectable. 1000 milliGRAM(s) IV Push every 24 hours  heparin  Injectable 5000 Unit(s) SubCutaneous three times a day  levothyroxine 50 MICROGram(s) Oral daily  pantoprazole    Tablet 40 milliGRAM(s) Oral before breakfast  predniSONE   Tablet 5 milliGRAM(s) Oral daily  simvastatin 40 milliGRAM(s) Oral at bedtime  sodium chloride 0.9%. 1000 milliLiter(s) (75 mL/Hr) IV Continuous <Continuous>      LABS: All Labs Reviewed:                        13.0   5.83  )-----------( 90       ( 03 Mar 2020 06:29 )             39.7     03-03    134<L>  |  102  |  12  ----------------------------<  83  3.6   |  24  |  0.96    Ca    8.6      03 Mar 2020 06:29    TPro  7.1  /  Alb  3.2<L>  /  TBili  0.5  /  DBili  x   /  AST  77<H>  /  ALT  73  /  AlkPhos  42  03-03    PT/INR - ( 02 Mar 2020 20:08 )   PT: 13.8 sec;   INR: 1.24 ratio         PTT - ( 02 Mar 2020 20:08 )  PTT:31.6 sec    Rapid RVP Result: NotDetec:       Blood Culture: Pending    RADIOLOGY/EKG:    < from: Xray Chest 1 View-PORTABLE IMMEDIATE (03.02.20 @ 18:33) >  P view of the chest from 1818 hours:     COMPARISON:  October 17, 2019, August 21, 2018, report fromCT otherwise chest May 25, 2007    The cardiac, hilar and mediastinal contours are unremarkable. 8 mm nodular opacity in the left midlung, unchanged from prior chest x-ray. The lungs are clear. The osseous structures demonstrate no acute abnormality.    IMPRESSION:    8 mm nodule in the left mid lung, unchanged since August 21, 2018. Recommend chest CT to better evaluate.  No acute disease.        ECHO - pending            DVT PPX: Heparin 5000units SC q8    ADVANCED DIRECTIVE: FULL CODE CHIEF COMPLAINT: 89 yo M with Hx of HTN, HLD, CAD s/p cardiac stent placement, colon ca s/p resection, hypothyroidism presents to the ED following 3 days hx of non productive cough, and progressively worsening lethargy and weakness. Brought in by daughter after noticing he appeared "sick" and more "difficult to arouse than usual". Recent sick contacts with Son (flu) and coworker (unknown illness). Most recent travel to [place] in Jan 2020 with plans for travel back this week. Denies N/V, pain, and diarrhea. Admitted for CAP, CRAIG and weakness.   SUBJECTIVE:     REVIEW OF SYSTEMS: Patient seen and examined at bedside today. No events overnight, reports that he is feeling better today and would like to go home.    CONSTITUTIONAL: No weakness, fevers or chills  EYES/ENT: No visual changes;  No vertigo or throat pain   NECK: No pain or stiffness  RESPIRATORY: No cough, wheezing, hemoptysis; No shortness of breath  CARDIOVASCULAR: No chest pain or palpitations  GASTROINTESTINAL: No abdominal or epigastric pain. No nausea, vomiting, or hematemesis; No diarrhea or constipation. No melena or hematochezia.  GENITOURINARY: No dysuria, frequency or hematuria  NEUROLOGICAL: No numbness or weakness  SKIN: No itching, burning, rashes, or lesions   All other review of systems is negative unless indicated above    Vital Signs Last 24 Hrs  T(C): 38.3 (03 Mar 2020 06:46), Max: 39.2 (03 Mar 2020 05:01)  T(F): 101 (03 Mar 2020 06:46), Max: 102.5 (03 Mar 2020 05:01)  HR: 72 (03 Mar 2020 05:01) (62 - 72)  BP: 153/63 (03 Mar 2020 05:01) (121/49 - 153/63)  BP(mean): 69 (02 Mar 2020 20:56) (69 - 78)  RR: 18 (03 Mar 2020 05:01) (16 - 21)  SpO2: 98% (03 Mar 2020 05:01) (84% - 100%)      PHYSICAL EXAM:    Constitutional: NAD, awake and alert, obese  HEENT: PERR, EOMI, Hard of hearing, MMM , poor dentition  Neck: Soft and supple, No LAD, No JVD  Respiratory: +crackles BLL, + wheezing upon reassessement later on  Cardiovascular: S1 and S2, regular rate and rhythm, no Murmurs, gallops or rubs  Gastrointestinal: Bowel Sounds present, soft, nontender, nondistended, no guarding, no rebound  Extremities: No peripheral edema  Vascular: 2+ peripheral pulses  Neurological: A/O x 3, no focal deficits  Musculoskeletal: 5/5 strength b/l upper and lower extremities  Skin: No rashes    MEDICATIONS:  MEDICATIONS  (STANDING):  amLODIPine   Tablet 2.5 milliGRAM(s) Oral daily  aspirin  chewable 81 milliGRAM(s) Oral daily  azithromycin   Tablet 500 milliGRAM(s) Oral daily  cefTRIAXone Injectable. 1000 milliGRAM(s) IV Push every 24 hours  heparin  Injectable 5000 Unit(s) SubCutaneous three times a day  levothyroxine 50 MICROGram(s) Oral daily  pantoprazole    Tablet 40 milliGRAM(s) Oral before breakfast  predniSONE   Tablet 5 milliGRAM(s) Oral daily  simvastatin 40 milliGRAM(s) Oral at bedtime  sodium chloride 0.9%. 1000 milliLiter(s) (75 mL/Hr) IV Continuous <Continuous>      LABS: All Labs Reviewed:    Vitamin B12, Serum in AM (03.03.20 @ 06:29)    Vitamin B12, Serum: 424 pg/mL                          13.0   5.83  )-----------( 90       ( 03 Mar 2020 06:29 )             39.7     03-03    134<L>  |  102  |  12  ----------------------------<  83  3.6   |  24  |  0.96    Ca    8.6      03 Mar 2020 06:29    TPro  7.1  /  Alb  3.2<L>  /  TBili  0.5  /  DBili  x   /  AST  77<H>  /  ALT  73  /  AlkPhos  42  03-03    PT/INR - ( 02 Mar 2020 20:08 )   PT: 13.8 sec;   INR: 1.24 ratio         PTT - ( 02 Mar 2020 20:08 )  PTT:31.6 sec    Rapid RVP Result: NotDetec:       Blood Culture: Pending    RADIOLOGY/EKG:    < from: Xray Chest 1 View-PORTABLE IMMEDIATE (03.02.20 @ 18:33) >  P view of the chest from 1818 hours:     COMPARISON:  October 17, 2019, August 21, 2018, report fromCT otherwise chest May 25, 2007    The cardiac, hilar and mediastinal contours are unremarkable. 8 mm nodular opacity in the left midlung, unchanged from prior chest x-ray. The lungs are clear. The osseous structures demonstrate no acute abnormality.    IMPRESSION:    8 mm nodule in the left mid lung, unchanged since August 21, 2018. Recommend chest CT to better evaluate.  No acute disease.        ECHO - pending      DVT PPX: Heparin 5000units SC q8    ADVANCED DIRECTIVE: FULL CODE CHIEF COMPLAINT: 89 yo M with Hx of HTN, HLD, CAD s/p cardiac stent placement, colon ca s/p resection, hypothyroidism presents to the ED following 3 days hx of non productive cough, and progressively worsening lethargy and weakness. Brought in by daughter after noticing he appeared "sick" and more "difficult to arouse than usual". Recent sick contacts with Son (flu) and coworker (unknown illness). Most recent travel to [place] in Jan 2020 with plans for travel back this week. Denies N/V, pain, and diarrhea. Admitted for CAP, CRAIG and weakness.     SUBJECTIVE:   3/3: Patient seen and examined at bedside today. No events overnight, reports that he is feeling better today and would like to go home.    REVIEW OF SYSTEMS:   CONSTITUTIONAL: No weakness, fevers or chills  EYES/ENT: No visual changes;  No vertigo or throat pain   NECK: No pain or stiffness  RESPIRATORY: No cough, wheezing, hemoptysis; No shortness of breath  CARDIOVASCULAR: No chest pain or palpitations  GASTROINTESTINAL: No abdominal or epigastric pain. No nausea, vomiting, or hematemesis; No diarrhea or constipation. No melena or hematochezia.  GENITOURINARY: No dysuria, frequency or hematuria  NEUROLOGICAL: No numbness or weakness  SKIN: No itching, burning, rashes, or lesions   All other review of systems is negative unless indicated above    Vital Signs Last 24 Hrs  T(C): 38.3 (03 Mar 2020 06:46), Max: 39.2 (03 Mar 2020 05:01)  T(F): 101 (03 Mar 2020 06:46), Max: 102.5 (03 Mar 2020 05:01)  HR: 72 (03 Mar 2020 05:01) (62 - 72)  BP: 153/63 (03 Mar 2020 05:01) (121/49 - 153/63)  BP(mean): 69 (02 Mar 2020 20:56) (69 - 78)  RR: 18 (03 Mar 2020 05:01) (16 - 21)  SpO2: 98% (03 Mar 2020 05:01) (84% - 100%)      PHYSICAL EXAM:    Constitutional: NAD, awake and alert, obese  HEENT: PERR, EOMI, Hard of hearing, MMM , poor dentition  Neck: Soft and supple, No LAD, No JVD  Respiratory: +crackles BLL, + wheezing  Cardiovascular: S1 and S2, regular rate and rhythm, no Murmurs, gallops or rubs  Gastrointestinal: Bowel Sounds present, soft, nontender, nondistended, no guarding, no rebound  Extremities: No peripheral edema  Vascular: 2+ peripheral pulses  Neurological: A/O x 3, no focal deficits  Musculoskeletal: 5/5 strength b/l upper and lower extremities  Skin: No rashes    MEDICATIONS:  MEDICATIONS  (STANDING):  amLODIPine   Tablet 2.5 milliGRAM(s) Oral daily  aspirin  chewable 81 milliGRAM(s) Oral daily  azithromycin   Tablet 500 milliGRAM(s) Oral daily  cefTRIAXone Injectable. 1000 milliGRAM(s) IV Push every 24 hours  heparin  Injectable 5000 Unit(s) SubCutaneous three times a day  levothyroxine 50 MICROGram(s) Oral daily  pantoprazole    Tablet 40 milliGRAM(s) Oral before breakfast  predniSONE   Tablet 5 milliGRAM(s) Oral daily  simvastatin 40 milliGRAM(s) Oral at bedtime  sodium chloride 0.9%. 1000 milliLiter(s) (75 mL/Hr) IV Continuous <Continuous>      LABS: All Labs Reviewed:    Vitamin B12, Serum in AM (03.03.20 @ 06:29)    Vitamin B12, Serum: 424 pg/mL                          13.0   5.83  )-----------( 90       ( 03 Mar 2020 06:29 )             39.7     03-03    134<L>  |  102  |  12  ----------------------------<  83  3.6   |  24  |  0.96    Ca    8.6      03 Mar 2020 06:29    TPro  7.1  /  Alb  3.2<L>  /  TBili  0.5  /  DBili  x   /  AST  77<H>  /  ALT  73  /  AlkPhos  42  03-03    PT/INR - ( 02 Mar 2020 20:08 )   PT: 13.8 sec;   INR: 1.24 ratio         PTT - ( 02 Mar 2020 20:08 )  PTT:31.6 sec    Rapid RVP Result: NotDetec:       Blood Culture: Pending    RADIOLOGY/EKG:    < from: Xray Chest 1 View-PORTABLE IMMEDIATE (03.02.20 @ 18:33) >  P view of the chest from 1818 hours:     COMPARISON:  October 17, 2019, August 21, 2018, report fromCT otherwise chest May 25, 2007    The cardiac, hilar and mediastinal contours are unremarkable. 8 mm nodular opacity in the left midlung, unchanged from prior chest x-ray. The lungs are clear. The osseous structures demonstrate no acute abnormality.    IMPRESSION:    8 mm nodule in the left mid lung, unchanged since August 21, 2018. Recommend chest CT to better evaluate.  No acute disease.        ECHO - pending result      DVT PPX: Heparin 5000units SC q8    ADVANCED DIRECTIVE: FULL CODE

## 2020-03-03 NOTE — PROGRESS NOTE ADULT - PROBLEM SELECTOR PLAN 2
- s/p 3.1 L LR Bolus  - s/p IV Ceftriaxone/azithromycin  - Continue IV Ceftriaxone/Azithromycin daily  - F/U Sputum culture  - F/U Blood cultures x 2 - s/p 3.1 L LR Bolus - repeat lactate 1.5  - Continue IV Ceftriaxone/Azithromycin daily  - F/U Sputum culture  - F/U Blood cultures x 2 - s/p 3.1 L LR Bolus - repeat lactate 1.5  - CXR - no reported PNA, or active cardiopulmonary disease; 8mm nodule however noted in L mid lung, said to be unchanged from 8/2018  - Will continue IV Ceftriaxone/Azithromycin daily for now as pt with fever spike, and f/u Bcx, sputum culture  - F/U Sputum culture -pending collection  - F/U Blood cultures x 2 - pending result

## 2020-03-03 NOTE — PROGRESS NOTE ADULT - PROBLEM SELECTOR PLAN 1
- O2 sat 89% on RA on initial presentation  - Supplemental O2  - F/U CXR  - ECHO pending - O2 sat 89% on RA on initial presentation  - Supplemental O2  - F/U CXR  - F/U AM ECHO screen for heart failure, pulmonary HTN  - ECHO pending - O2 sat 89% on RA on initial presentation  - Supplemental O2  - F/U CXR  - add Duonebs for wheezing  - F/U AM ECHO screen for heart failure, pulmonary HTN  - ECHO pending - O2 sat 89% on RA on initial presentation  - Supplemental O2  - CXR - 8mm nodule on L mid lung, siad to be unchanged from 8/2018, CT chest suggested to further evaluate  - add Duonebs for wheezing  - F/U AM ECHO - performed, pending read

## 2020-03-03 NOTE — CHART NOTE - NSCHARTNOTEFT_GEN_A_CORE
Pt's CXR from admission reviewed. 8mm nodule of L mid lung noted, said to be unchanged from previous imaging on 8/2018. Pt with h/o colon cancer, s/p resection years ago.  Per discussion with attending,  will get CT chest. Heme/Onc was also contacted per discussion with attending. I spoke with Dr. Mckenzie who will see pt in the AM and give further recommendation from heme/onc standpoint. Suggested possible CT abdomen if getting CT.  I also called and spoke with pt's daughter (Aleja, HCP) to inform of CAT scan. Pt states she will be coming back to the hospital today, is ok with proceeding with CT. Pt's CXR from admission reviewed. 8mm nodule of L mid lung noted, said to be unchanged from previous imaging on 8/2018. Pt with h/o colon cancer, s/p resection years ago.  Per discussion with attending,  will get CT chest. Heme/Onc was also contacted per discussion with attending. CT surg to be contacted.  I spoke with Dr. Mckenzie who will see pt in the AM and give further recommendation from heme/onc standpoint. Suggested possible CT abdomen if getting CT.  I also called and spoke with pt's daughter (Aleja, HCP) to inform of CAT scan. Pt states she will be coming back to the hospital today, is ok with proceeding with CT, however states pt and family may not want further workup even if lung nodule concerning. Will obtain further clarification from pt and family, moving forward. Daughter reports pt with h/o of smoking in the far past (~40+ years ago).  - Will hold off CT surg consult for now until CT results.      < from: Xray Chest 1 View-PORTABLE IMMEDIATE (03.02.20 @ 18:33) >    PROCEDURE DATE:  03/02/2020      INTERPRETATION:  Clinical information: Sepsis    AP view of the chest from 1818 hours:     COMPARISON:  October 17, 2019, August 21, 2018, report fromCT otherwise chest May 25, 2007    The cardiac, hilar and mediastinal contours are unremarkable. 8 mm nodular opacity in the left midlung, unchanged from prior chest x-ray. The lungs are clear. The osseous structures demonstrate no acute abnormality.    IMPRESSION:    8 mm nodule in the left mid lung, unchanged since August 21, 2018. Recommend chest CT to better evaluate.  No acute disease.

## 2020-03-03 NOTE — PROGRESS NOTE ADULT - PROBLEM SELECTOR PLAN 6
- AST/ALT mildly elevated  - Trend LFTs  - Consider adjusting medication regimen - ALT remains mildly elevated  - Trend LFTs  - Consider adjusting medication regimen - ALT remains mildly elevated- overall downtrending  - Trend LFTs

## 2020-03-03 NOTE — PROVIDER CONTACT NOTE (OTHER) - SITUATION
-Patient's plts 90. Dr. Gomez aware.   -Patient's Sodium dropped to 134. MD aware.  -Patient's been bladder scanned twice throughout the day, not fully emptying bladder. No c/o pain or discomfort.

## 2020-03-03 NOTE — CHART NOTE - NSCHARTNOTEFT_GEN_A_CORE
Date: March 03, 2020        To whom it may concern,      Mr. Travis Juan is currently admitted in the hospital for treatment of an acute illness. Was informed by family about patient and his family's upcoming trip to [place] on 3/4/2020 on the given airline. Patient, currently at the moment, is unable to travel given his current admission to the hospital for treatment. Patient, and family, will be good for traveling once medically optimized and discharged from the hospital. Please regard patient's (as well as his family) current state with regards to travel reimbursement as deemed appropriate per your policy.  Please do not hesitate to contact us with any questions that you may have.      Sincerely,    Dr. Soraya Gomez M.D.  [facility] at [facility]

## 2020-03-04 LAB
-  COAGULASE NEGATIVE STAPHYLOCOCCUS: SIGNIFICANT CHANGE UP
ANION GAP SERPL CALC-SCNC: 9 MMOL/L — SIGNIFICANT CHANGE UP (ref 5–17)
BUN SERPL-MCNC: 12 MG/DL — SIGNIFICANT CHANGE UP (ref 7–23)
CALCIUM SERPL-MCNC: 8.5 MG/DL — SIGNIFICANT CHANGE UP (ref 8.5–10.1)
CHLORIDE SERPL-SCNC: 101 MMOL/L — SIGNIFICANT CHANGE UP (ref 96–108)
CO2 SERPL-SCNC: 23 MMOL/L — SIGNIFICANT CHANGE UP (ref 22–31)
CREAT SERPL-MCNC: 1.03 MG/DL — SIGNIFICANT CHANGE UP (ref 0.5–1.3)
CULTURE RESULTS: SIGNIFICANT CHANGE UP
GLUCOSE SERPL-MCNC: 97 MG/DL — SIGNIFICANT CHANGE UP (ref 70–99)
GRAM STN FLD: SIGNIFICANT CHANGE UP
HCT VFR BLD CALC: 41.7 % — SIGNIFICANT CHANGE UP (ref 39–50)
HGB BLD-MCNC: 13.6 G/DL — SIGNIFICANT CHANGE UP (ref 13–17)
MCHC RBC-ENTMCNC: 29.3 PG — SIGNIFICANT CHANGE UP (ref 27–34)
MCHC RBC-ENTMCNC: 32.6 GM/DL — SIGNIFICANT CHANGE UP (ref 32–36)
MCV RBC AUTO: 89.9 FL — SIGNIFICANT CHANGE UP (ref 80–100)
METHOD TYPE: SIGNIFICANT CHANGE UP
PLATELET # BLD AUTO: 99 K/UL — LOW (ref 150–400)
POTASSIUM SERPL-MCNC: 3.7 MMOL/L — SIGNIFICANT CHANGE UP (ref 3.5–5.3)
POTASSIUM SERPL-SCNC: 3.7 MMOL/L — SIGNIFICANT CHANGE UP (ref 3.5–5.3)
RBC # BLD: 4.64 M/UL — SIGNIFICANT CHANGE UP (ref 4.2–5.8)
RBC # FLD: 14 % — SIGNIFICANT CHANGE UP (ref 10.3–14.5)
SODIUM SERPL-SCNC: 133 MMOL/L — LOW (ref 135–145)
SPECIMEN SOURCE: SIGNIFICANT CHANGE UP
WBC # BLD: 8.61 K/UL — SIGNIFICANT CHANGE UP (ref 3.8–10.5)
WBC # FLD AUTO: 8.61 K/UL — SIGNIFICANT CHANGE UP (ref 3.8–10.5)

## 2020-03-04 PROCEDURE — 99233 SBSQ HOSP IP/OBS HIGH 50: CPT | Mod: GC

## 2020-03-04 RX ORDER — ACETAMINOPHEN 500 MG
1000 TABLET ORAL ONCE
Refills: 0 | Status: DISCONTINUED | OUTPATIENT
Start: 2020-03-04 | End: 2020-03-04

## 2020-03-04 RX ORDER — IPRATROPIUM/ALBUTEROL SULFATE 18-103MCG
3 AEROSOL WITH ADAPTER (GRAM) INHALATION ONCE
Refills: 0 | Status: COMPLETED | OUTPATIENT
Start: 2020-03-04 | End: 2020-03-04

## 2020-03-04 RX ORDER — AZITHROMYCIN 500 MG/1
500 TABLET, FILM COATED ORAL DAILY
Refills: 0 | Status: DISCONTINUED | OUTPATIENT
Start: 2020-03-05 | End: 2020-03-04

## 2020-03-04 RX ORDER — ACETAMINOPHEN 500 MG
1000 TABLET ORAL ONCE
Refills: 0 | Status: COMPLETED | OUTPATIENT
Start: 2020-03-04 | End: 2020-03-04

## 2020-03-04 RX ORDER — SODIUM CHLORIDE 9 MG/ML
1000 INJECTION INTRAMUSCULAR; INTRAVENOUS; SUBCUTANEOUS
Refills: 0 | Status: DISCONTINUED | OUTPATIENT
Start: 2020-03-04 | End: 2020-03-06

## 2020-03-04 RX ADMIN — Medication 400 MILLIGRAM(S): at 22:57

## 2020-03-04 RX ADMIN — CEFTRIAXONE 1000 MILLIGRAM(S): 500 INJECTION, POWDER, FOR SOLUTION INTRAMUSCULAR; INTRAVENOUS at 21:46

## 2020-03-04 RX ADMIN — HEPARIN SODIUM 5000 UNIT(S): 5000 INJECTION INTRAVENOUS; SUBCUTANEOUS at 05:23

## 2020-03-04 RX ADMIN — PANTOPRAZOLE SODIUM 40 MILLIGRAM(S): 20 TABLET, DELAYED RELEASE ORAL at 05:24

## 2020-03-04 RX ADMIN — Medication 3 MILLILITER(S): at 01:46

## 2020-03-04 RX ADMIN — HEPARIN SODIUM 5000 UNIT(S): 5000 INJECTION INTRAVENOUS; SUBCUTANEOUS at 12:17

## 2020-03-04 RX ADMIN — AMLODIPINE BESYLATE 2.5 MILLIGRAM(S): 2.5 TABLET ORAL at 05:24

## 2020-03-04 RX ADMIN — HEPARIN SODIUM 5000 UNIT(S): 5000 INJECTION INTRAVENOUS; SUBCUTANEOUS at 21:47

## 2020-03-04 RX ADMIN — AZITHROMYCIN 500 MILLIGRAM(S): 500 TABLET, FILM COATED ORAL at 12:17

## 2020-03-04 RX ADMIN — Medication 5 MILLIGRAM(S): at 05:24

## 2020-03-04 RX ADMIN — Medication 81 MILLIGRAM(S): at 12:17

## 2020-03-04 RX ADMIN — SODIUM CHLORIDE 100 MILLILITER(S): 9 INJECTION INTRAMUSCULAR; INTRAVENOUS; SUBCUTANEOUS at 17:54

## 2020-03-04 RX ADMIN — Medication 50 MICROGRAM(S): at 05:23

## 2020-03-04 NOTE — DISCHARGE NOTE NURSING/CASE MANAGEMENT/SOCIAL WORK - PATIENT PORTAL LINK FT
You can access the FollowMyHealth Patient Portal offered by [facility] by registering at the following website: [email or web address]. By joining [facility]’s FollowMyHealth portal, you will also be able to view your health information using other applications (apps) compatible with our system.

## 2020-03-04 NOTE — DIETITIAN INITIAL EVALUATION ADULT. - PHYSICAL APPEARANCE
other (specify)/obese NFPE Findings:   (x  ) No Muscle/fat wasting at this time.  Skin- sacrum stage 1 with +1 edema documented. Alen score=15 (high risk of skin breakdown)

## 2020-03-04 NOTE — DIETITIAN INITIAL EVALUATION ADULT. - OTHER INFO
89 yo M with Hx of HTN, HLD, CAD s/p cardiac stent placement, colon ca s/p resection, hypothyroidism presents to the ED following 3 days hx of non productive cough, and progressively worsening lethargy and weakness. 89 yo M with Hx of HTN, HLD, CAD s/p cardiac stent placement, colon ca s/p resection, hypothyroidism presents to the ED following 3 days hx of non productive cough, and progressively worsening lethargy and weakness. Currently pt alert and consuming <50% of meals at this time, however intake slightly improving. PTA pt with good po intake and no significant weight changes. Daughters at bedside. Daughters concerned about fathers hydration. Labs indicate fluid retention Na+ (L). Pt consumes liquids during daily however may not be meeting his needs. Encouraged to sip fluids throughout the day for optimal intake. Will continue to monitor tolerance of po intake and follow up prn.

## 2020-03-04 NOTE — CHART NOTE - NSCHARTNOTEFT_GEN_A_CORE
Upon Nutritional Assessment by the Registered Dietitian your patient was determined to meet criteria / has evidence of the following diagnosis/diagnoses:          [ ]  Mild Protein Calorie Malnutrition        [ x]  Moderate Protein Calorie Malnutrition        [ ] Severe Protein Calorie Malnutrition        [ ] Unspecified Protein Calorie Malnutrition        [ ] Underweight / BMI <19        [ ] Morbid Obesity / BMI > 40      Findings as based on:  •  Comprehensive nutrition assessment and consultation  •  Calorie counts (nutrient intake analysis)  •  Food acceptance and intake status from observations by staff  •  Follow up  •  Patient education  •  Intervention secondary to interdisciplinary rounds  •   concerns      *********Malnutrition moderate protein/calorie malnutrition in context of acute on chronic illness.   Etiology AEB poor intake and not meeting energy/hydration/protein needs 2/2 UTI/hypoxemia.   Signs/Symptoms poor po intake, significant weight loss, +1 edema, stage 1 PU.   Goal/Expected Outcome Optimal po intake to meet fluid and nutrition needs. Reduced s/s of malnutrition.    · Additional Recommendations  1) encourage adherence to low sodium diet   2) monitor daily weights   3) monitor labs/lytes   4) Maintain aspiration precautions, back of bed >35 degrees.   5) MVI w/ minerals to meet RDI's        PROVIDER Section:     By signing this assessment you are acknowledging and agree with the diagnosis/diagnoses assigned by the Registered Dietitian    Comments:

## 2020-03-04 NOTE — CONSULT NOTE ADULT - SUBJECTIVE AND OBJECTIVE BOX
Patient is a 89y old  Male who presents with a chief complaint of Acute hypoxic respiratory failure 2/2 CAP (04 Mar 2020 15:38)    HPI:  89 yo M with Hx of HTN, HLD, CAD s/p cardiac stent placement, colon ca s/p resection, hypothyroidism presents with 3 day history of progressively worsening weakness and lethargy with associated dry cough. She notes patient generally sleeps alot during the day and easily falls asleep but notes that he was very lethargic requiring more effort to be aroused.  Daughter describes pt having a sick appearance. Pt has had decreased appetite. Pt has 2 recent sick contacts, son had flu and family co-worker was very ill recently. He is constipated intermittently, for which he has taken milk of magnesia. Denies any recent hospitalizations. Pt last travelled in 2020 to [place] and has plans to travel there this week. In the ED pt was hypoxic to 89% on RA. Pt was given 3.1 L bolus of LR and IV ceftriaxone/azithromycin. Noted initially febrile 102, nl wbc ct, CT chest/abd/pelvis RML-RLL atelectasis, lingular pulm nodule.       PMH: as above  PSH: as above  Meds: per reconciliation sheet, noted below  MEDICATIONS  (STANDING):  amLODIPine   Tablet 2.5 milliGRAM(s) Oral daily  aspirin  chewable 81 milliGRAM(s) Oral daily  azithromycin   Tablet 500 milliGRAM(s) Oral daily  cefTRIAXone Injectable. 1000 milliGRAM(s) IV Push every 24 hours  heparin  Injectable 5000 Unit(s) SubCutaneous three times a day  levothyroxine 50 MICROGram(s) Oral daily  pantoprazole    Tablet 40 milliGRAM(s) Oral before breakfast  predniSONE   Tablet 5 milliGRAM(s) Oral daily  simvastatin 40 milliGRAM(s) Oral at bedtime  sodium chloride 0.9%. 1000 milliLiter(s) (100 mL/Hr) IV Continuous <Continuous>      Allergies    penicillins (Unknown)    Intolerances      Social: no smoking, no alcohol, no illegal drugs; no recent travel, no exposure to TB  FAMILY HISTORY:  No pertinent family history in first degree relatives     no history of premature cardiovascular disease in first degree relatives    ROS:  no HA, no dizziness, no sore throat, no blurry vision, no CP, no palpitations, no sob, no cough, no abdominal pain, no diarrhea, no N/V, no dysuria, no leg pain, no claudication, no rash, no joint aches, no rectal pain or bleeding, no night sweats  All other systems reviewed and are negative    Vital Signs Last 24 Hrs  T(C): 37.1 (04 Mar 2020 11:33), Max: 38.1 (03 Mar 2020 21:34)  T(F): 98.7 (04 Mar 2020 11:33), Max: 100.6 (03 Mar 2020 21:34)  HR: 70 (04 Mar 2020 11:) (70 - 73)  BP: 134/63 (04 Mar 2020 11:33) (134/63 - 156/57)  BP(mean): --  RR: 16 (04 Mar 2020 11:) (16 - 17)  SpO2: 95% (04 Mar 2020 11:) (95% - 97%)  Daily     Daily Weight in k.6 (04 Mar 2020 12:02)    PE:    Constitutional: frail looking  HEENT: NC/AT, EOMI, PERRLA, conjunctivae clear; ears and nose atraumatic; pharynx benign  Neck: supple; thyroid not palpable  Back: no tenderness  Respiratory: clear to auscultation  Cardiovascular: S1S2 regular, no murmurs  Abdomen: soft, not tender, not distended, positive BS; liver and spleen WNL  Genitourinary: no suprapubic tenderness  Lymphatic: no LN palpable  Musculoskeletal: no muscle tenderness, no joint swelling or tenderness  Extremities: no pedal edema  Neurological/ Psychiatric:  moving all extremities  Skin: no rashes; no palpable lesions    Labs: all available labs reviewed                        13.6   8.61  )-----------( 99       ( 04 Mar 2020 09:07 )             41.7     03-04    133<L>  |  101  |  12  ----------------------------<  97  3.7   |  23  |  1.03    Ca    8.5      04 Mar 2020 09:07    TPro  7.1  /  Alb  3.2<L>  /  TBili  0.5  /  DBili  x   /  AST  77<H>  /  ALT  73  /  AlkPhos  42  03-03     LIVER FUNCTIONS - ( 03 Mar 2020 06:29 )  Alb: 3.2 g/dL / Pro: 7.1 gm/dL / ALK PHOS: 42 U/L / ALT: 73 U/L / AST: 77 U/L / GGT: x           Urinalysis Basic - ( 02 Mar 2020 19:34 )    Color: Yellow / Appearance: Clear / S.010 / pH: x  Gluc: x / Ketone: Negative  / Bili: Negative / Urobili: Negative mg/dL   Blood: x / Protein: 15 mg/dL / Nitrite: Negative   Leuk Esterase: Trace / RBC: 3-5 /HPF / WBC 0-2   Sq Epi: x / Non Sq Epi: Occasional / Bacteria: Negative      Culture - Urine (20 @ 19:34)    Specimen Source: .Urine None    Culture Results:   >=3 organisms. Probable collection contamination.    Culture - Blood (20 @ 17:53)    -  Coagulase negative Staphylococcus: Detec    Gram Stain:   Growth in aerobic bottle:  Gram positive cocci in pairs    Specimen Source: .Blood Blood-Peripheral    Organism: Blood Culture PCR    Culture Results:   Growth in aerobic bottle:  Gram positive cocci in pairs  ***Blood Panel PCR results on this specimen are available  approximately 3 hours after the Gram stain result.***  Gram stain, PCR, and/or culture results may not always  correspond due to difference in methodologies.  ************************************************************  This PCR assay was performed using [employer].  The following targets are tested for: Enterococcus,  vancomycin resistant enterococci, Listeria monocytogenes,  coagulase negative staphylococci, S. aureus,  methicillin resistant S. aureus, Streptococcus agalactiae  (Group B), S. pneumoniae, S. pyogenes (Group A),  Acinetobacter baumannii, Enterobacter cloacae, E. coli,  Klebsiella oxytoca, K. pneumoniae,Proteus sp.,  Serratia marcescens, Haemophilus influenzae,  Neisseria meningitidis, Pseudomonas aeruginosa, Candida  albicans, C. glabrata, C krusei, C parapsilosis,  C. tropicalis and the KPC resistance gene.    Organism Identification: Blood Culture PCR    Method Type: PCR        Radiology: all available radiological tests reviewed  < from: CT Abdomen and Pelvis No Cont (20 @ 16:18) >  EXAM:  CT ABDOMEN AND PELVIS                          EXAM:  CT CHEST                            PROCEDURE DATE:  2020          INTERPRETATION:  Chest CT without contrast dated 3/3/2020.    COMPARISON: 2017.  CLINICAL INFORMATION: History of colon CA and a stable lung nodule.    TECHNIQUE: Contiguous axial 2.5 mm slice thickness images of the chest were obtained without intravenous contrast administration.    FINDINGS:  Thyroid lobes are unremarkable.  The airway shows normal caliberand contour with patent lumen.  There is elevation of the right hemidiaphragm and some atelectatic changes in the right lower lobe and the right middle lobe.. A1.2 cm calcified nodule in the posterior lingula. A 4 mm nodule in the lingula on image 2-39 is unchanged. There are linear atelectatic changes at the left lung base.    There is no pleural effusion or pneumothorax.    There are no mediastinal lymphadenopathy or masses.    The mediastinum great vessels , calcified plaques involving the aorta and calcified coronary arteries.     The heart is normal. There is no pericardial effusion.    The bones , status post replacement of the proximal right humerus. Degenerative changes in the left glenohumeral joint.     IMPRESSION:   Only a tiny nodule in the lingula, unchanged since the prior study.  Other findings as above.    Non contrast CT of the abdomen and pelvis dated 3/3/2020.     COMPARISON: 2017.    CLINICAL HISTORY: History of colon CA.    Technique: contiguous axial images wereobtained with 2.5 mm slice thickness without intravenous or oral contrast administration, which limits the visualization of the intra-abdominal structures.   Coronal and sagittal reformats were also submitted for interpretation.      FINDINGS:     There is no free intra-abdominal air or ascites.     The unopacified liver, the configuration of the liver is suggestive of liver cirrhosis. This is manifested by hypertrophy of the left lobe and the caudate lobe.     The spleen, pancreas, adrenal glands and gallbladder are normal.     There is no intra or extrahepatic biliary ductal dilatation.    The unopacified stomach, duodenum, small bowel are normal. Anastomotic site is visualized in the distal transverse colon. Colonic diverticulosis.    Bothkidneys , a 2.4 cm hypodense lesion in the lateral cortex of the left kidney, cannot be characterized due to the noncontrast technique. This hypodense lesion is slightly larger as compared to the prior study. Ultrasound would be of value for further evaluation if clinically indicated.     The urinary bladder shows normal morphology and contour.      The reproductive organs , seminal vesicles are unremarkable and prostate is not enlarged.     There are no retroperitoneal masses or lymphadenopathy.    The retroperitoneal vascular structures , abdominal aorta and iliac arteries are heavily calcified.     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    IMPRESSION:   No evidence of malignancy in the abdomen or pelvis.   Findings as above.    < end of copied text >    Advanced directives addressed: full resuscitation

## 2020-03-04 NOTE — CONSULT NOTE ADULT - ASSESSMENT
89 yo M with Hx of HTN, HLD, CAD s/p cardiac stent placement, colon ca s/p resection, hypothyroidism presents with 3 day history of progressively worsening weakness and lethargy with associated dry cough. She notes patient generally sleeps alot during the day and easily falls asleep but notes that he was very lethargic requiring more effort to be aroused.  Daughter describes pt having a sick appearance. Pt has had decreased appetite. Pt has 2 recent sick contacts, son had flu and family co-worker was very ill recently. He is constipated intermittently, for which he has taken milk of magnesia. Denies any recent hospitalizations. Pt last travelled in Jan 2020 to [place] and has plans to travel there this week. In the ED pt was hypoxic to 89% on RA. Pt was given 3.1 L bolus of LR and IV ceftriaxone/azithromycin. Noted initially febrile 102, nl wbc ct, CT chest/abd/pelvis RML-RLL atelectasis, lingular pulm nodule.     1. fever. hypoxia. RML-RLL atelectasis vs. pna. positive blood culture  - imaging reviewed, no resp sxs, favor atelectasis doubt pna  - 1 bottle blood cx CONS c/w contaminant  - on rocephin 1 gm daily #3  - on azithro 500mg daily #3/3  - ua/urine cx, RVP unremarkable  - no clear fever source  - f/u repeat blood cx  - if afebrile, consider dc abx/observe   - will d/w team  - supportive care  - fu cbc    2. other issues - care per medicine

## 2020-03-04 NOTE — DIETITIAN INITIAL EVALUATION ADULT. - PERTINENT MEDS FT
MEDICATIONS  (STANDING):  amLODIPine   Tablet 2.5 milliGRAM(s) Oral daily  aspirin  chewable 81 milliGRAM(s) Oral daily  azithromycin   Tablet 500 milliGRAM(s) Oral daily  cefTRIAXone Injectable. 1000 milliGRAM(s) IV Push every 24 hours  heparin  Injectable 5000 Unit(s) SubCutaneous three times a day  levothyroxine 50 MICROGram(s) Oral daily  pantoprazole    Tablet 40 milliGRAM(s) Oral before breakfast  predniSONE   Tablet 5 milliGRAM(s) Oral daily  simvastatin 40 milliGRAM(s) Oral at bedtime  sodium chloride 0.9%. 1000 milliLiter(s) (75 mL/Hr) IV Continuous <Continuous>    MEDICATIONS  (PRN):  acetaminophen   Tablet .. 650 milliGRAM(s) Oral every 6 hours PRN Temp greater or equal to 38C (100.4F), Mild Pain (1 - 3)  albuterol/ipratropium for Nebulization. 3 milliLiter(s) Nebulizer every 6 hours PRN Shortness of Breath and/or Wheezing

## 2020-03-04 NOTE — DIETITIAN INITIAL EVALUATION ADULT. - PERTINENT LABORATORY DATA
03-04 Na133 mmol/L<L> Glu 97 mg/dL K+ 3.7 mmol/L Cr  1.03 mg/dL BUN 12 mg/dL Phos n/a   Alb n/a   PAB n/a

## 2020-03-04 NOTE — DIETITIAN INITIAL EVALUATION ADULT. - ADD RECOMMEND
1) encourage adherence to low sodium diet 2) monitor daily weights 3) monitor labs/lytes 4) Maintain aspiration precautions, back of bed >35 degrees. 5) MVI w/ minerals to meet RDI's

## 2020-03-04 NOTE — PROGRESS NOTE ADULT - ASSESSMENT
89 yo M as described above presenting with 3 day history of weakness/lethargy with associated nonproductive cough and decreased PO intake admitted for acute hypoxic respiratory failure secondary to suspected Community Acquired Pneumonia and CRAIG secondary to poor PO intake.        #DVT PPx  - IMPROVE Score: 2  - Heparin SQ    #Advanced directives  - HCP: daughter Aleja  - FULL CODE

## 2020-03-04 NOTE — PROVIDER CONTACT NOTE (OTHER) - SITUATION
Patient is sleepier than yesterday. Speaking slightly illogical at times. More and more forgetful. Not eating or drinking at all. Attitude seems a little bit more aggressive than yesterday as well.

## 2020-03-04 NOTE — PROGRESS NOTE ADULT - PROBLEM SELECTOR PLAN 4
- Likely due to acute infxn, however also with worsening mental status this afternoon. Pt with worsening hypoNa since admission and poor PO intake - restarted NS @ 100, f/u urine Na and urine osmolality  Nutrition consult recommendations made - will add multivit, etc  -  Vitamin B12, Serum: 424 pg/mL this AM  - Consider outpatient sleep study in light of daytime somnolence, body habitus  - PT Consult- recs MARCELO on discharge  Pt has hx of PMR - continue prednisone 5 qd

## 2020-03-04 NOTE — PROVIDER CONTACT NOTE (OTHER) - ACTION/TREATMENT ORDERED:
Dr. Simon made aware. Stated will discuss with peers and come evaluate the patient.
Dr. Gomez stated Hematology will see patient tomorrow.   Continue to monitor I&O's.  Will order neb treatments for patient.

## 2020-03-04 NOTE — PROGRESS NOTE ADULT - PROBLEM SELECTOR PLAN 3
-Resolved; likely prerenal 2/2 hypovolemia from dehydration  - Cr 1.33 on admission (baseline Cr 1.0-1.2) currently 0.96  - encourage PO intake

## 2020-03-04 NOTE — PROGRESS NOTE ADULT - PROBLEM SELECTOR PLAN 2
Continue IV Ceftriaxone/Azithromycin daily for now   f/u Bcx, sputum culture  f/u ID  - F/U Sputum culture -pending collection  1 bottle of initial Bcx with coag neg staph, likely contaminant - f/u repeat BCx from today

## 2020-03-04 NOTE — PROGRESS NOTE ADULT - SUBJECTIVE AND OBJECTIVE BOX
89M w/ Hx of HTN, HLD, CAD s/p cardiac stent placement, colon ca s/p resection, hypothyroidism presents to the ED following 3 days hx of non productive cough, and progressively worsening lethargy and weakness. Brought in by daughter after noticing he appeared "sick" and more "difficult to arouse than usual". Recent sick contacts with Son (flu) and coworker (unknown illness).     3/3: Patient seen and examined at bedside today. No events overnight, pt reports feeling well. Called by nurse later on for change in mental status - as per family he is AOx3 at home, works, drives himself around. He is more somnolent in afternoon after nurse called us.    REVIEW OF SYSTEMS:   CONSTITUTIONAL: No weakness, fevers or chills  EYES/ENT: No visual changes;  No vertigo or throat pain   NECK: No pain or stiffness  RESPIRATORY: No cough, wheezing, hemoptysis; No shortness of breath  CARDIOVASCULAR: No chest pain or palpitations  GASTROINTESTINAL: No abdominal or epigastric pain. No nausea, vomiting, or hematemesis; No diarrhea or constipation. No melena or hematochezia.  GENITOURINARY: No dysuria, frequency or hematuria  NEUROLOGICAL: No numbness or weakness  SKIN: No itching, burning, rashes, or lesions   All other review of systems is negative unless indicated above    Vital Signs Last 24 Hrs  T(C): 37.1 (20 @ 11:33)  T(F): 98.7 (20 @ 11:33), Max: 100.6 (20 @ 21:34)  HR: 70 (20 @ :33) (70 - 73)  BP: 134/63 (20 @ 11:33)  BP(mean): --  RR: 16 (20 @ 11:33) (16 - 17)  SpO2: 95% (20 @ 11:33) (95% - 97%)  Wt(kg): --    PHYSICAL EXAM:    Constitutional: NAD, awake and alert, obese  HEENT: PERR, EOMI, Hard of hearing, MMM , poor dentition  Neck: Soft and supple, No LAD, No JVD  Respiratory: poor air entry b/l, no crackles or wheezing appreciated  Cardiovascular: S1 and S2, regular rate and rhythm, no Murmurs, gallops or rubs  Gastrointestinal: soft, nontender, nondistended, no guarding, no rebound  Extremities: No peripheral edema  Vascular: 2+ peripheral pulses  Neurological: A/O x 3, no focal deficits  Musculoskeletal: 5/5 strength b/l upper and lower extremities  Skin: No rashes      Lab Results:                                            13.6                  Neurophils% (auto):   x      ( @ 09:07):    8.61 )-----------(99           Lymphocytes% (auto):  x                                             41.7                   Eosinphils% (auto):   x        Manual%: Neutrophils x    ; Lymphocytes x    ; Eosinophils x    ; Bands%: x    ; Blasts x         Differential:	[] Automated		[] Manual        133<L>  |  101  |  12  ----------------------------<  97  3.7   |  23  |  1.03    Ca    8.5      04 Mar 2020 09:07    TPro  7.1  /  Alb  3.2<L>  /  TBili  0.5  /  DBili  x   /  AST  77<H>  /  ALT  73  /  AlkPhos  42  03-03    PT/INR - ( 02 Mar 2020 20:08 )   PT: 13.8 sec;   INR: 1.24 ratio     PTT - ( 02 Mar 2020 20:08 )  PTT:31.6 sec    Urinalysis Basic - ( 02 Mar 2020 19:34 )    Color: Yellow / Appearance: Clear / S.010 / pH: x  Gluc: x / Ketone: Negative  / Bili: Negative / Urobili: Negative mg/dL   Blood: x / Protein: 15 mg/dL / Nitrite: Negative   Leuk Esterase: Trace / RBC: 3-5 /HPF / WBC 0-2   Sq Epi: x / Non Sq Epi: Occasional / Bacteria: Negative    Culture - Blood (20 @ 17:53)    Specimen Source: .Blood Blood-Peripheral    Culture Results:   No growth to date.    Culture - Blood (20 @ 17:53)    -  Coagulase negative Staphylococcus: Detec    Gram Stain:   Growth in aerobic bottle:  Gram positive cocci in pairs    Specimen Source: .Blood Blood-Peripheral    Organism: Blood Culture PCR    Culture Results:   Growth in aerobic bottle:  Gram positive cocci in pairs  ***Blood Panel PCR results on this specimen are available  approximately 3 hours after the Gram stain result.***  Gram stain, PCR, and/or culture results may not always  correspond due to difference in methodologies.  ************************************************************  This PCR assay was performed using [employer].  The following targets are tested for: Enterococcus,  vancomycin resistant enterococci, Listeria monocytogenes,  coagulase negative staphylococci, S. aureus,  methicillin resistant S. aureus, Streptococcus agalactiae  (Group B), S. pneumoniae, S. pyogenes (Group A),  Acinetobacter baumannii, Enterobacter cloacae, E. coli,  Klebsiella oxytoca, K. pneumoniae,Proteus sp.,  Serratia marcescens, Haemophilus influenzae,  Neisseria meningitidis, Pseudomonas aeruginosa, Candida  albicans, C. glabrata, C krusei, C parapsilosis,  C. tropicalis and the KPC resistance gene.    Organism Identification: Blood Culture PCR    Method Type: PCR    Rapid RVP Result: NotDetec:         RADIOLOGY/EKG:    < from: CT Chest, Abdomen and Pelvis No Cont (20 @ 16:18) >  Only a tiny nodule in the lingula, unchanged since the prior study.  Other findings as above.No evidence of malignancy in the abdomen or pelvis.     < end of copied text >    < from: TTE Echo Complete w/o contrast w/ Doppler (20 @ 09:09) >  The mitral valve was not well visualized. Fibrocalcific changes noted to   the mitral valve leaflets with preserved leaflet excursion. No mitral   regurgitation is present.   EA reversal of the mitral inflow consistent with reduced compliance of the   left ventricle.   The aortic valve is not well visualized, appears mildly sclerotic. Valve   opening seems to be normal.   No aortic regurgitation is present.   The tricuspid valve is not well visualized, but is probably normal.   No tricuspid valve regurgitation is present.   Pulmonic valve not well seen.   The left atrium is mildly dilated.   Mild septal left ventricular hypertrophy is present.   Estimated left ventricular ejection fraction is 60 %.   Normal appearing right atrium.    < end of copied text >      < from: Xray Chest 1 View-PORTABLE IMMEDIATE (20 @ 18:33) >  P view of the chest from 1818 hours:     COMPARISON:  2019, 2018, report fromCT otherwise chest May 25, 2007    The cardiac, hilar and mediastinal contours are unremarkable. 8 mm nodular opacity in the left midlung, unchanged from prior chest x-ray. The lungs are clear. The osseous structures demonstrate no acute abnormality.    IMPRESSION:    8 mm nodule in the left mid lung, unchanged since 2018. Recommend chest CT to better evaluate.  No acute disease.

## 2020-03-04 NOTE — PROGRESS NOTE ADULT - PROBLEM SELECTOR PLAN 1
- O2 sat 89% on RA on initial presentation, improved to mid-high 90s on RA  - Supplemental O2 PRN  - CXR - no evidence of PNA  - add Duonebs for wheezing  - TTE results as above - diastolic dysfunction and normal EF

## 2020-03-04 NOTE — CHART NOTE - NSCHARTNOTEFT_GEN_A_CORE
Pt seen and examined with house staff.  Plan formulated and reviewed on rounds     Briefly,   89 y/o male with HTN and CAD admitted with CAP sepsis--Bcx + for CNS in bottle--repeat BCx sent.  RVP negative. UA negative  On CTX and azithromycin  Daughters at bedside  No events overnight     Awake and alert  NAD  stable vitals  Tm 102.5  Non toxic appearing    Not CAP given XR  Fever of unknown origin     Repeat BCx  CTX/azithromycin (3) (1)  ID consult as requested by family  Follow up Cxs  No further w/u of stable lingula nodule    Above d/w daughters at bedside-- All concerns addressed including but not limited to diagnosis, treatment plan and overall prognosis

## 2020-03-05 LAB
ALBUMIN SERPL ELPH-MCNC: 2.9 G/DL — LOW (ref 3.3–5)
ALP SERPL-CCNC: 34 U/L — LOW (ref 40–120)
ALT FLD-CCNC: 55 U/L — SIGNIFICANT CHANGE UP (ref 12–78)
ANION GAP SERPL CALC-SCNC: 9 MMOL/L — SIGNIFICANT CHANGE UP (ref 5–17)
AST SERPL-CCNC: 56 U/L — HIGH (ref 15–37)
BILIRUB SERPL-MCNC: 0.5 MG/DL — SIGNIFICANT CHANGE UP (ref 0.2–1.2)
BUN SERPL-MCNC: 17 MG/DL — SIGNIFICANT CHANGE UP (ref 7–23)
CALCIUM SERPL-MCNC: 8 MG/DL — LOW (ref 8.5–10.1)
CHLORIDE SERPL-SCNC: 101 MMOL/L — SIGNIFICANT CHANGE UP (ref 96–108)
CO2 SERPL-SCNC: 23 MMOL/L — SIGNIFICANT CHANGE UP (ref 22–31)
CREAT SERPL-MCNC: 1.17 MG/DL — SIGNIFICANT CHANGE UP (ref 0.5–1.3)
CULTURE RESULTS: SIGNIFICANT CHANGE UP
GLUCOSE SERPL-MCNC: 94 MG/DL — SIGNIFICANT CHANGE UP (ref 70–99)
HCT VFR BLD CALC: 36.7 % — LOW (ref 39–50)
HGB BLD-MCNC: 12.4 G/DL — LOW (ref 13–17)
LACTATE SERPL-SCNC: 1.1 MMOL/L — SIGNIFICANT CHANGE UP (ref 0.7–2)
MCHC RBC-ENTMCNC: 29.7 PG — SIGNIFICANT CHANGE UP (ref 27–34)
MCHC RBC-ENTMCNC: 33.8 GM/DL — SIGNIFICANT CHANGE UP (ref 32–36)
MCV RBC AUTO: 88 FL — SIGNIFICANT CHANGE UP (ref 80–100)
ORGANISM # SPEC MICROSCOPIC CNT: SIGNIFICANT CHANGE UP
ORGANISM # SPEC MICROSCOPIC CNT: SIGNIFICANT CHANGE UP
OSMOLALITY UR: 531 MOSM/KG — SIGNIFICANT CHANGE UP (ref 50–1200)
PLATELET # BLD AUTO: 86 K/UL — LOW (ref 150–400)
POTASSIUM SERPL-MCNC: 3.2 MMOL/L — LOW (ref 3.5–5.3)
POTASSIUM SERPL-SCNC: 3.2 MMOL/L — LOW (ref 3.5–5.3)
PROT SERPL-MCNC: 6.8 GM/DL — SIGNIFICANT CHANGE UP (ref 6–8.3)
RBC # BLD: 4.17 M/UL — LOW (ref 4.2–5.8)
RBC # FLD: 13.9 % — SIGNIFICANT CHANGE UP (ref 10.3–14.5)
SODIUM SERPL-SCNC: 133 MMOL/L — LOW (ref 135–145)
SODIUM UR-SCNC: 77 MMOL/L — SIGNIFICANT CHANGE UP
SPECIMEN SOURCE: SIGNIFICANT CHANGE UP
WBC # BLD: 6.32 K/UL — SIGNIFICANT CHANGE UP (ref 3.8–10.5)
WBC # FLD AUTO: 6.32 K/UL — SIGNIFICANT CHANGE UP (ref 3.8–10.5)

## 2020-03-05 PROCEDURE — 99233 SBSQ HOSP IP/OBS HIGH 50: CPT | Mod: GC

## 2020-03-05 RX ORDER — POTASSIUM CHLORIDE 20 MEQ
40 PACKET (EA) ORAL ONCE
Refills: 0 | Status: COMPLETED | OUTPATIENT
Start: 2020-03-05 | End: 2020-03-05

## 2020-03-05 RX ORDER — IBUPROFEN 200 MG
600 TABLET ORAL ONCE
Refills: 0 | Status: COMPLETED | OUTPATIENT
Start: 2020-03-05 | End: 2020-03-05

## 2020-03-05 RX ADMIN — SIMVASTATIN 40 MILLIGRAM(S): 20 TABLET, FILM COATED ORAL at 22:35

## 2020-03-05 RX ADMIN — Medication 50 MICROGRAM(S): at 06:02

## 2020-03-05 RX ADMIN — Medication 81 MILLIGRAM(S): at 12:49

## 2020-03-05 RX ADMIN — SODIUM CHLORIDE 100 MILLILITER(S): 9 INJECTION INTRAMUSCULAR; INTRAVENOUS; SUBCUTANEOUS at 12:46

## 2020-03-05 RX ADMIN — HEPARIN SODIUM 5000 UNIT(S): 5000 INJECTION INTRAVENOUS; SUBCUTANEOUS at 22:35

## 2020-03-05 RX ADMIN — Medication 600 MILLIGRAM(S): at 06:02

## 2020-03-05 RX ADMIN — AZITHROMYCIN 500 MILLIGRAM(S): 500 TABLET, FILM COATED ORAL at 12:49

## 2020-03-05 RX ADMIN — PANTOPRAZOLE SODIUM 40 MILLIGRAM(S): 20 TABLET, DELAYED RELEASE ORAL at 06:01

## 2020-03-05 RX ADMIN — AMLODIPINE BESYLATE 2.5 MILLIGRAM(S): 2.5 TABLET ORAL at 06:02

## 2020-03-05 RX ADMIN — HEPARIN SODIUM 5000 UNIT(S): 5000 INJECTION INTRAVENOUS; SUBCUTANEOUS at 06:02

## 2020-03-05 RX ADMIN — Medication 40 MILLIEQUIVALENT(S): at 12:49

## 2020-03-05 RX ADMIN — Medication 5 MILLIGRAM(S): at 06:02

## 2020-03-05 RX ADMIN — CEFTRIAXONE 1000 MILLIGRAM(S): 500 INJECTION, POWDER, FOR SOLUTION INTRAMUSCULAR; INTRAVENOUS at 22:39

## 2020-03-05 NOTE — PROGRESS NOTE ADULT - ASSESSMENT
89 yo M with Hx of HTN, HLD, CAD s/p cardiac stent placement, colon ca s/p resection, hypothyroidism presents with 3 day history of progressively worsening weakness and lethargy with associated dry cough. She notes patient generally sleeps alot during the day and easily falls asleep but notes that he was very lethargic requiring more effort to be aroused.  Daughter describes pt having a sick appearance. Pt has had decreased appetite. Pt has 2 recent sick contacts, son had flu and family co-worker was very ill recently. He is constipated intermittently, for which he has taken milk of magnesia. Denies any recent hospitalizations. Pt last travelled in Jan 2020 to [place] and has plans to travel there this week. In the ED pt was hypoxic to 89% on RA. Pt was given 3.1 L bolus of LR and IV ceftriaxone/azithromycin. Noted initially febrile 102, nl wbc ct, CT chest/abd/pelvis RML-RLL atelectasis, lingular pulm nodule.     1. fever. hypoxia. RML-RLL atelectasis vs. pna. positive blood culture  - temps o/n afebrile this am, improving   - 1 bottle blood cx CONS c/w contaminant  - on rocephin 1 gm daily #4  - on azithro 500mg daily #3/3  - ua/urine cx, RVP unremarkable  - repeat blood cx no growth   - supportive care  - fu cbc    2. other issues - care per medicine

## 2020-03-05 NOTE — PROGRESS NOTE ADULT - SUBJECTIVE AND OBJECTIVE BOX
Date of service: 20 @ 12:53    pt seen and examined  tmax 103 o/n   afebrile this am, feels better  MS improved  reports intermittent dry cough    ROS: denies dizziness, no HA, no abdominal pain, no diarrhea or constipation; no dysuria, no urinary frequency, no legs pain, no rashes    MEDICATIONS  (STANDING):  amLODIPine   Tablet 2.5 milliGRAM(s) Oral daily  aspirin  chewable 81 milliGRAM(s) Oral daily  cefTRIAXone Injectable. 1000 milliGRAM(s) IV Push every 24 hours  heparin  Injectable 5000 Unit(s) SubCutaneous three times a day  levothyroxine 50 MICROGram(s) Oral daily  pantoprazole    Tablet 40 milliGRAM(s) Oral before breakfast  predniSONE   Tablet 5 milliGRAM(s) Oral daily  simvastatin 40 milliGRAM(s) Oral at bedtime  sodium chloride 0.9%. 1000 milliLiter(s) (100 mL/Hr) IV Continuous <Continuous>      Vital Signs Last 24 Hrs  T(C): 36.8 (05 Mar 2020 16:30), Max: 39.4 (04 Mar 2020 21:30)  T(F): 98.3 (05 Mar 2020 16:30), Max: 103 (04 Mar 2020 21:30)  HR: 66 (05 Mar 2020 16:30) (66 - 79)  BP: 117/48 (05 Mar 2020 16:30) (117/48 - 169/59)  BP(mean): --  RR: 18 (05 Mar 2020 16:30) (17 - 19)  SpO2: 98% (05 Mar 2020 16:30) (97% - 100%)    PE:  Constitutional: frail looking  HEENT: NC/AT, EOMI, PERRLA, conjunctivae clear; ears and nose atraumatic; pharynx benign  Neck: supple; thyroid not palpable  Back: no tenderness  Respiratory: clear to auscultation  Cardiovascular: S1S2 regular, no murmurs  Abdomen: soft, not tender, not distended, positive BS; liver and spleen WNL  Genitourinary: no suprapubic tenderness  Lymphatic: no LN palpable  Musculoskeletal: no muscle tenderness, no joint swelling or tenderness  Extremities: no pedal edema  Neurological/ Psychiatric:  moving all extremities  Skin: no rashes; no palpable lesions    Labs: all available labs reviewed                                   12.4   6.32  )-----------( 86       ( 05 Mar 2020 05:13 )             36.7     03-    133<L>  |  101  |  17  ----------------------------<  94  3.2<L>   |  23  |  1.17    Ca    8.0<L>      05 Mar 2020 05:13    TPro  6.8  /  Alb  2.9<L>  /  TBili  0.5  /  DBili  x   /  AST  56<H>  /  ALT  55  /  AlkPhos  34<L>  03-05           Color: Yellow / Appearance: Clear / S.010 / pH: x  Gluc: x / Ketone: Negative  / Bili: Negative / Urobili: Negative mg/dL   Blood: x / Protein: 15 mg/dL / Nitrite: Negative   Leuk Esterase: Trace / RBC: 3-5 /HPF / WBC 0-2   Sq Epi: x / Non Sq Epi: Occasional / Bacteria: Negative      Culture - Urine (20 @ 19:34)    Specimen Source: .Urine None    Culture Results:   >=3 organisms. Probable collection contamination.    Culture - Blood (20 @ 17:53)    -  Coagulase negative Staphylococcus: Detec    Gram Stain:   Growth in aerobic bottle:  Gram positive cocci in pairs    Specimen Source: .Blood Blood-Peripheral    Organism: Blood Culture PCR    Culture Results:   Growth in aerobic bottle:  Gram positive cocci in pairs  ***Blood Panel PCR results on this specimen are available  approximately 3 hours after the Gram stain result.***  Gram stain, PCR, and/or culture results may not always  correspond due to difference in methodologies.  ************************************************************  This PCR assay was performed using [employer].  The following targets are tested for: Enterococcus,  vancomycin resistant enterococci, Listeria monocytogenes,  coagulase negative staphylococci, S. aureus,  methicillin resistant S. aureus, Streptococcus agalactiae  (Group B), S. pneumoniae, S. pyogenes (Group A),  Acinetobacter baumannii, Enterobacter cloacae, E. coli,  Klebsiella oxytoca, K. pneumoniae,Proteus sp.,  Serratia marcescens, Haemophilus influenzae,  Neisseria meningitidis, Pseudomonas aeruginosa, Candida  albicans, C. glabrata, C krusei, C parapsilosis,  C. tropicalis and the KPC resistance gene.    Organism Identification: Blood Culture PCR    Method Type: PCR        Radiology: all available radiological tests reviewed  < from: CT Abdomen and Pelvis No Cont (20 @ 16:18) >  EXAM:  CT ABDOMEN AND PELVIS                          EXAM:  CT CHEST                            PROCEDURE DATE:  2020          INTERPRETATION:  Chest CT without contrast dated 3/3/2020.    COMPARISON: 2017.  CLINICAL INFORMATION: History of colon CA and a stable lung nodule.    TECHNIQUE: Contiguous axial 2.5 mm slice thickness images of the chest were obtained without intravenous contrast administration.    FINDINGS:  Thyroid lobes are unremarkable.  The airway shows normal caliberand contour with patent lumen.  There is elevation of the right hemidiaphragm and some atelectatic changes in the right lower lobe and the right middle lobe.. A1.2 cm calcified nodule in the posterior lingula. A 4 mm nodule in the lingula on image 2-39 is unchanged. There are linear atelectatic changes at the left lung base.    There is no pleural effusion or pneumothorax.    There are no mediastinal lymphadenopathy or masses.    The mediastinum great vessels , calcified plaques involving the aorta and calcified coronary arteries.     The heart is normal. There is no pericardial effusion.    The bones , status post replacement of the proximal right humerus. Degenerative changes in the left glenohumeral joint.     IMPRESSION:   Only a tiny nodule in the lingula, unchanged since the prior study.  Other findings as above.    Non contrast CT of the abdomen and pelvis dated 3/3/2020.     COMPARISON: 2017.    CLINICAL HISTORY: History of colon CA.    Technique: contiguous axial images wereobtained with 2.5 mm slice thickness without intravenous or oral contrast administration, which limits the visualization of the intra-abdominal structures.   Coronal and sagittal reformats were also submitted for interpretation.      FINDINGS:     There is no free intra-abdominal air or ascites.     The unopacified liver, the configuration of the liver is suggestive of liver cirrhosis. This is manifested by hypertrophy of the left lobe and the caudate lobe.     The spleen, pancreas, adrenal glands and gallbladder are normal.     There is no intra or extrahepatic biliary ductal dilatation.    The unopacified stomach, duodenum, small bowel are normal. Anastomotic site is visualized in the distal transverse colon. Colonic diverticulosis.    Bothkidneys , a 2.4 cm hypodense lesion in the lateral cortex of the left kidney, cannot be characterized due to the noncontrast technique. This hypodense lesion is slightly larger as compared to the prior study. Ultrasound would be of value for further evaluation if clinically indicated.     The urinary bladder shows normal morphology and contour.      The reproductive organs , seminal vesicles are unremarkable and prostate is not enlarged.     There are no retroperitoneal masses or lymphadenopathy.    The retroperitoneal vascular structures , abdominal aorta and iliac arteries are heavily calcified.     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    IMPRESSION:   No evidence of malignancy in the abdomen or pelvis.   Findings as above.    < end of copied text >    Advanced directives addressed: full resuscitation

## 2020-03-05 NOTE — CHART NOTE - NSCHARTNOTEFT_GEN_A_CORE
Pt seen and examined with house staff.  Plan formulated and reviewed on rounds    Briefly,   89 y/o male with HTN and CAD admitted with CAP sepsis--Bcx + for CNS in bottle--repeat BCx sent.  RVP negative. UA negative  On CTX and azithromycin  Daughter at bedside  Slightly confused overnight--better this morning    Awake and alert  NAD  stable vitals  Tm 103.0  Non toxic appearing    Not CAP given XR  Fever of unknown origin     Repeat BCx--follow up  CTX/azithromycin (4) (2)  ID consult as requested by family  Follow up Cxs  No further w/u of stable lingula nodule  Prednisone for PMR  DVT prophy--sqhep  Can DC IVF once intake is adequate     Above d/w daughters at bedside-- All concerns addressed including but not limited to diagnosis, treatment plan and overall prognosis.

## 2020-03-05 NOTE — PROGRESS NOTE ADULT - PROBLEM SELECTOR PLAN 4
- Likely due to acute infxn,   - more hyponatremic since admission and poor PO intake - continue NS @ 100   - f/u urine Na and urine osmolality  - Nutrition consult recommendations made - will add multivit, etc  -  Vitamin B12, Serum: 424 pg/mL   - Consider outpatient sleep study in light of daytime somnolence, body habitus  - PT Consult- recs MARCELO on discharge  - Pt has hx of PMR - continue prednisone 5 qd

## 2020-03-05 NOTE — PROGRESS NOTE ADULT - PROBLEM SELECTOR PLAN 2
- Continue IV Ceftriaxone  - Azithromycin 500 mg PO x 3 days - course completed today 3/5  - Cont. motrin/tylenol for fevers  - F/U Sputum culture -pending collection  -1 bottle of initial Bcx  (3/2) with coag neg staph, likely contaminant  - f/u repeat BCx from 3/5

## 2020-03-05 NOTE — PROGRESS NOTE ADULT - SUBJECTIVE AND OBJECTIVE BOX
SUBJECTIVE:     89M w/ Hx of HTN, HLD, CAD s/p cardiac stent placement, colon ca s/p resection, hypothyroidism presents to the ED following 3 days hx of non productive cough, and progressively worsening lethargy and weakness. Brought in by daughter after noticing he appeared "sick" and more "difficult to arouse than usual". Recent sick contacts with Son (flu) and coworker (unknown illness).     3/5- Patient seen and examined at bedside today. Febrile overnight to 101.8F for which he received Tylenol and Motrin. Currently afebrile but was lethargic and confused this morning; currently more awake and oriented to surroundings.     CONSTITUTIONAL: No weakness, fevers or chills  EYES/ENT: No visual changes;  No vertigo or throat pain   NECK: No pain or stiffness  RESPIRATORY: No cough, wheezing, hemoptysis; No shortness of breath  CARDIOVASCULAR: No chest pain or palpitations  GASTROINTESTINAL: No abdominal or epigastric pain. No nausea, vomiting, or hematemesis; No diarrhea or constipation. No melena or hematochezia.  GENITOURINARY: No dysuria, frequency or hematuria  NEUROLOGICAL: No numbness or weakness  SKIN: No itching, burning, rashes, or lesions   All other review of systems is negative unless indicated above    Vital Signs Last 24 Hrs  T(C): 36.4 (05 Mar 2020 11:20), Max: 39.4 (04 Mar 2020 21:30)  T(F): 97.5 (05 Mar 2020 11:20), Max: 103 (04 Mar 2020 21:30)  HR: 66 (05 Mar 2020 11:20) (66 - 79)  BP: 123/44 (05 Mar 2020 11:20) (123/44 - 169/59)  BP(mean): --  RR: 17 (05 Mar 2020 11:20) (17 - 19)  SpO2: 98% (05 Mar 2020 11:20) (97% - 100%)    I&O's Summary    05 Mar 2020 07:01  -  05 Mar 2020 12:57  --------------------------------------------------------  IN: 1550 mL / OUT: 0 mL / NET: 1550 mL        CAPILLARY BLOOD GLUCOSE      POCT Blood Glucose.: 112 mg/dL (04 Mar 2020 14:19)      PHYSICAL EXAM:    Constitutional: NAD, awake and alert, obese  HEENT: PERR, EOMI, Hard of hearing, MMM , poor dentition  Neck: Soft and supple, No LAD, No JVD  Respiratory: Fine crackles B/L at bases, No wheezing, cough or rhonchi  Cardiovascular: S1 and S2, regular rate and rhythm, no Murmurs, gallops or rubs  Gastrointestinal: Bowel Sounds present, soft, nontender, nondistended, no guarding, no rebound  Extremities: No peripheral edema  Vascular: 2+ peripheral pulses  Neurological: A/O x 3, no focal deficits  Musculoskeletal: 5/5 strength b/l upper and lower extremities  Skin: No rashes    MEDICATIONS:  MEDICATIONS  (STANDING):  amLODIPine   Tablet 2.5 milliGRAM(s) Oral daily  aspirin  chewable 81 milliGRAM(s) Oral daily  cefTRIAXone Injectable. 1000 milliGRAM(s) IV Push every 24 hours  heparin  Injectable 5000 Unit(s) SubCutaneous three times a day  levothyroxine 50 MICROGram(s) Oral daily  pantoprazole    Tablet 40 milliGRAM(s) Oral before breakfast  predniSONE   Tablet 5 milliGRAM(s) Oral daily  simvastatin 40 milliGRAM(s) Oral at bedtime  sodium chloride 0.9%. 1000 milliLiter(s) (100 mL/Hr) IV Continuous <Continuous>      LABS: All Labs Reviewed:                        12.4   6.32  )-----------( 86       ( 05 Mar 2020 05:13 )             36.7     03-05    133<L>  |  101  |  17  ----------------------------<  94  3.2<L>   |  23  |  1.17    Ca    8.0<L>      05 Mar 2020 05:13    TPro  6.8  /  Alb  2.9<L>  /  TBili  0.5  /  DBili  x   /  AST  56<H>  /  ALT  55  /  AlkPhos  34<L>  03-05          Blood Culture: 03-02 @ 19:34  Organism --  Gram Stain Blood -- Gram Stain --  Specimen Source .Urine None  Culture-Blood --    03-02 @ 17:53  Organism Blood Culture PCR  Gram Stain Blood -- Gram Stain   Growth in aerobic bottle:  Gram positive cocci in pairs  Specimen Source .Blood Blood-Peripheral  Culture-Blood --        < from: CT Chest, Abdomen and Pelvis No Cont (03.03.20 @ 16:18) >  Only a tiny nodule in the lingula, unchanged since the prior study.  Other findings as above. No evidence of malignancy in the abdomen or pelvis.          < from: TTE Echo Complete w/o contrast w/ Doppler (03.03.20 @ 09:09) >  The mitral valve was not well visualized. Fibrocalcific changes noted to   the mitral valve leaflets with preserved leaflet excursion. No mitral   regurgitation is present.   EA reversal of the mitral inflow consistent with reduced compliance of the   left ventricle.   The aortic valve is not well visualized, appears mildly sclerotic. Valve   opening seems to be normal.   No aortic regurgitation is present.   The tricuspid valve is not well visualized, but is probably normal.   No tricuspid valve regurgitation is present.   Pulmonic valve not well seen.   The left atrium is mildly dilated.   Mild septal left ventricular hypertrophy is present.   Estimated left ventricular ejection fraction is 60 %.   Normal appearing right atrium.          < from: Xray Chest 1 View-PORTABLE IMMEDIATE (03.02.20 @ 18:33) >  P view of the chest from 1818 hours:     COMPARISON:  October 17, 2019, August 21, 2018, report fromCT otherwise chest May 25, 2007    The cardiac, hilar and mediastinal contours are unremarkable. 8 mm nodular opacity in the left midlung, unchanged from prior chest x-ray. The lungs are clear. The osseous structures demonstrate no acute abnormality.    IMPRESSION:    8 mm nodule in the left mid lung, unchanged since August 21, 2018. Recommend chest CT to better evaluate.  No acute disease.                DVT PPX:    ADVANCED DIRECTIVE:    DISPOSITION:

## 2020-03-05 NOTE — PROGRESS NOTE ADULT - PROBLEM SELECTOR PLAN 1
- O2 sat 89% on RA on initial presentation, improved to mid-high 90s on RA  - Supplemental O2 PRN  - CXR - no evidence of PNA  - Duonebs PRN for wheezing  - TTE results as above - diastolic dysfunction and normal EF  - CT results as above - tiny nodule in the lingula, unchanged since the prior study

## 2020-03-05 NOTE — PROGRESS NOTE ADULT - PROBLEM SELECTOR PLAN 3
-Resolved; likely prerenal 2/2 hypovolemia from dehydration  - Cr 1.33 on admission (baseline Cr 1.0-1.2) currently 0.96  - encourage PO intake  - NS 100ml/hr 2/2 poor PO intake - D/C when PO intake improves

## 2020-03-06 LAB
ADD ON TEST-SPECIMEN IN LAB: SIGNIFICANT CHANGE UP
ADD ON TEST-SPECIMEN IN LAB: SIGNIFICANT CHANGE UP
ANION GAP SERPL CALC-SCNC: 8 MMOL/L — SIGNIFICANT CHANGE UP (ref 5–17)
BASOPHILS # BLD AUTO: 0.01 K/UL — SIGNIFICANT CHANGE UP (ref 0–0.2)
BASOPHILS NFR BLD AUTO: 0.2 % — SIGNIFICANT CHANGE UP (ref 0–2)
BUN SERPL-MCNC: 14 MG/DL — SIGNIFICANT CHANGE UP (ref 7–23)
CALCIUM SERPL-MCNC: 7.7 MG/DL — LOW (ref 8.5–10.1)
CHLORIDE SERPL-SCNC: 103 MMOL/L — SIGNIFICANT CHANGE UP (ref 96–108)
CO2 SERPL-SCNC: 22 MMOL/L — SIGNIFICANT CHANGE UP (ref 22–31)
CREAT SERPL-MCNC: 1.05 MG/DL — SIGNIFICANT CHANGE UP (ref 0.5–1.3)
EOSINOPHIL # BLD AUTO: 0.01 K/UL — SIGNIFICANT CHANGE UP (ref 0–0.5)
EOSINOPHIL NFR BLD AUTO: 0.2 % — SIGNIFICANT CHANGE UP (ref 0–6)
GLUCOSE SERPL-MCNC: 86 MG/DL — SIGNIFICANT CHANGE UP (ref 70–99)
HCT VFR BLD CALC: 34.2 % — LOW (ref 39–50)
HGB BLD-MCNC: 11.5 G/DL — LOW (ref 13–17)
IMM GRANULOCYTES NFR BLD AUTO: 0.4 % — SIGNIFICANT CHANGE UP (ref 0–1.5)
LYMPHOCYTES # BLD AUTO: 0.76 K/UL — LOW (ref 1–3.3)
LYMPHOCYTES # BLD AUTO: 14.7 % — SIGNIFICANT CHANGE UP (ref 13–44)
MCHC RBC-ENTMCNC: 29.7 PG — SIGNIFICANT CHANGE UP (ref 27–34)
MCHC RBC-ENTMCNC: 33.6 GM/DL — SIGNIFICANT CHANGE UP (ref 32–36)
MCV RBC AUTO: 88.4 FL — SIGNIFICANT CHANGE UP (ref 80–100)
MONOCYTES # BLD AUTO: 0.5 K/UL — SIGNIFICANT CHANGE UP (ref 0–0.9)
MONOCYTES NFR BLD AUTO: 9.7 % — SIGNIFICANT CHANGE UP (ref 2–14)
NEUTROPHILS # BLD AUTO: 3.88 K/UL — SIGNIFICANT CHANGE UP (ref 1.8–7.4)
NEUTROPHILS NFR BLD AUTO: 74.8 % — SIGNIFICANT CHANGE UP (ref 43–77)
PLATELET # BLD AUTO: 74 K/UL — LOW (ref 150–400)
POTASSIUM SERPL-MCNC: 3.3 MMOL/L — LOW (ref 3.5–5.3)
POTASSIUM SERPL-SCNC: 3.3 MMOL/L — LOW (ref 3.5–5.3)
RBC # BLD: 3.87 M/UL — LOW (ref 4.2–5.8)
RBC # FLD: 13.8 % — SIGNIFICANT CHANGE UP (ref 10.3–14.5)
SODIUM SERPL-SCNC: 133 MMOL/L — LOW (ref 135–145)
WBC # BLD: 5.18 K/UL — SIGNIFICANT CHANGE UP (ref 3.8–10.5)
WBC # FLD AUTO: 5.18 K/UL — SIGNIFICANT CHANGE UP (ref 3.8–10.5)

## 2020-03-06 PROCEDURE — 99233 SBSQ HOSP IP/OBS HIGH 50: CPT | Mod: GC

## 2020-03-06 RX ORDER — ACETAMINOPHEN 500 MG
1000 TABLET ORAL ONCE
Refills: 0 | Status: COMPLETED | OUTPATIENT
Start: 2020-03-06 | End: 2020-03-06

## 2020-03-06 RX ORDER — FUROSEMIDE 40 MG
20 TABLET ORAL ONCE
Refills: 0 | Status: COMPLETED | OUTPATIENT
Start: 2020-03-06 | End: 2020-03-06

## 2020-03-06 RX ORDER — POTASSIUM CHLORIDE 20 MEQ
40 PACKET (EA) ORAL ONCE
Refills: 0 | Status: COMPLETED | OUTPATIENT
Start: 2020-03-06 | End: 2020-03-06

## 2020-03-06 RX ADMIN — HEPARIN SODIUM 5000 UNIT(S): 5000 INJECTION INTRAVENOUS; SUBCUTANEOUS at 21:43

## 2020-03-06 RX ADMIN — CEFTRIAXONE 1000 MILLIGRAM(S): 500 INJECTION, POWDER, FOR SOLUTION INTRAMUSCULAR; INTRAVENOUS at 21:44

## 2020-03-06 RX ADMIN — Medication 81 MILLIGRAM(S): at 11:14

## 2020-03-06 RX ADMIN — Medication 20 MILLIGRAM(S): at 11:13

## 2020-03-06 RX ADMIN — SIMVASTATIN 40 MILLIGRAM(S): 20 TABLET, FILM COATED ORAL at 21:43

## 2020-03-06 RX ADMIN — Medication 40 MILLIEQUIVALENT(S): at 11:16

## 2020-03-06 RX ADMIN — HEPARIN SODIUM 5000 UNIT(S): 5000 INJECTION INTRAVENOUS; SUBCUTANEOUS at 06:33

## 2020-03-06 RX ADMIN — Medication 400 MILLIGRAM(S): at 06:27

## 2020-03-06 RX ADMIN — Medication 650 MILLIGRAM(S): at 13:50

## 2020-03-06 RX ADMIN — Medication 5 MILLIGRAM(S): at 11:14

## 2020-03-06 RX ADMIN — PANTOPRAZOLE SODIUM 40 MILLIGRAM(S): 20 TABLET, DELAYED RELEASE ORAL at 06:30

## 2020-03-06 NOTE — PROGRESS NOTE ADULT - PROBLEM SELECTOR PLAN 1
- O2 sat 89% on RA on initial presentation, improved to mid-high 90s on RA  - Supplemental O2 PRN  - CXR - no evidence of PNA  - Duonebs PRN for wheezing  - TTE results as above - diastolic dysfunction and normal EF  - CT results as above - tiny nodule in the lingula, unchanged since the prior study - O2 sat 89% on RA on initial presentation, improved to mid-high 90s on RA  - Supplemental O2 PRN  - CXR - no evidence of PNA  - Duonebs PRN for wheezing  - TTE results as above - diastolic dysfunction and normal EF  - CT results as above - tiny nodule in the lingula, unchanged since the prior study  - 20mg IVP lasix x 1 dose - expiratory wheezing possibly due to fluid overload. IVF d/c - O2 sat 89% on RA on initial presentation, improved to mid-high 90s on RA  - Supplemental O2 PRN  - CXR - no evidence of PNA  - Duonebs PRN for wheezing  - TTE results as above - diastolic dysfunction and normal EF  - CT results as above - tiny nodule in the lingula, unchanged since the prior study  - 20mg IVP lasix x 1 dose - expiratory wheezing possibly due to fluid overload.   -IVF discontinued

## 2020-03-06 NOTE — PROGRESS NOTE ADULT - SUBJECTIVE AND OBJECTIVE BOX
SUBJECTIVE:   89M w/ Hx of HTN, HLD, CAD s/p cardiac stent placement, colon ca s/p resection, hypothyroidism presents to the ED following 3 days hx of non productive cough, and progressively worsening lethargy and weakness. Brought in by daughter after noticing he appeared "sick" and more "difficult to arouse than usual". Recent sick contacts with Son (flu) and coworker (unknown illness).     3/- Patient seen and examined at bedside today. Febrile this morning to 103.9    CONSTITUTIONAL: No weakness, fevers or chills  EYES/ENT: No visual changes;  No vertigo or throat pain   NECK: No pain or stiffness  RESPIRATORY: No cough, wheezing, hemoptysis; No shortness of breath  CARDIOVASCULAR: No chest pain or palpitations  GASTROINTESTINAL: No abdominal or epigastric pain. No nausea, vomiting, or hematemesis; No diarrhea or constipation. No melena or hematochezia.  GENITOURINARY: No dysuria, frequency or hematuria  NEUROLOGICAL: No numbness or weakness  SKIN: No itching, burning, rashes, or lesions   All other review of systems is negative unless indicated above    Vital Signs Last 24 Hrs  T(C): 39.9 (06 Mar 2020 05:20), Max: 39.9 (06 Mar 2020 05:20)  T(F): 103.9 (06 Mar 2020 05:20), Max: 103.9 (06 Mar 2020 05:20)  HR: 78 (06 Mar 2020 05:20) (66 - 78)  BP: 142/65 (06 Mar 2020 05:20) (117/48 - 142/65)  BP(mean): --  RR: 18 (06 Mar 2020 05:20) (17 - 18)  SpO2: 96% (06 Mar 2020 05:20) (96% - 98%)    I&O's Summary    05 Mar 2020 07:01  -  06 Mar 2020 07:00  --------------------------------------------------------  IN: 1550 mL / OUT: 650 mL / NET: 900 mL        CAPILLARY BLOOD GLUCOSE          PHYSICAL EXAM:    Constitutional: NAD, awake and alert, obese, warm to touch  HEENT: PERR, EOMI, Hard of hearing, MMM , poor dentition  Neck: Soft and supple, No LAD, No JVD  Respiratory: slight expiratory wheezing, no cough, crackles or rhonchi  Cardiovascular: S1 and S2, regular rate and rhythm, no Murmurs, gallops or rubs  Gastrointestinal: Bowel Sounds present, soft, nontender, nondistended, no guarding, no rebound  Extremities: No peripheral edema  Vascular: 2+ peripheral pulses  Neurological: A/O x 3, no focal deficits  Musculoskeletal: 5/5 strength b/l upper and lower extremities  Skin: No rashes    MEDICATIONS:  MEDICATIONS  (STANDING):  amLODIPine   Tablet 2.5 milliGRAM(s) Oral daily  aspirin  chewable 81 milliGRAM(s) Oral daily  cefTRIAXone Injectable. 1000 milliGRAM(s) IV Push every 24 hours  heparin  Injectable 5000 Unit(s) SubCutaneous three times a day  levothyroxine 50 MICROGram(s) Oral daily  pantoprazole    Tablet 40 milliGRAM(s) Oral before breakfast  potassium chloride    Tablet ER 40 milliEquivalent(s) Oral once  predniSONE   Tablet 5 milliGRAM(s) Oral daily  simvastatin 40 milliGRAM(s) Oral at bedtime  sodium chloride 0.9%. 1000 milliLiter(s) (100 mL/Hr) IV Continuous <Continuous>      LABS: All Labs Reviewed:                        11.5   5.18  )-----------( 74       ( 06 Mar 2020 07:03 )             34.2     03-06    133<L>  |  103  |  14  ----------------------------<  86  3.3<L>   |  22  |  1.05    Ca    7.7<L>      06 Mar 2020 07:03    TPro  6.8  /  Alb  2.9<L>  /  TBili  0.5  /  DBili  x   /  AST  56<H>  /  ALT  55  /  AlkPhos  34<L>  03-05          Blood Culture: 03-04 @ 13:40  Organism --  Gram Stain Blood -- Gram Stain --  Specimen Source .Blood None  Culture-Blood --    03-04 @ 11:33  Organism --  Gram Stain Blood -- Gram Stain --  Specimen Source .Blood None  Culture-Blood --    03-02 @ 19:34  Organism --  Gram Stain Blood -- Gram Stain --  Specimen Source .Urine None  Culture-Blood --    03-02 @ 17:53  Organism Blood Culture PCR  Gram Stain Blood -- Gram Stain   Growth in aerobic bottle:  Gram positive cocci in pairs  Specimen Source .Blood Blood-Peripheral  Culture-Blood --        RADIOLOGY/EKG:  < from: CT Chest, Abdomen and Pelvis No Cont (03.03.20 @ 16:18) >  Only a tiny nodule in the lingula, unchanged since the prior study.  Other findings as above. No evidence of malignancy in the abdomen or pelvis.          < from: TTE Echo Complete w/o contrast w/ Doppler (03.03.20 @ 09:09) >  The mitral valve was not well visualized. Fibrocalcific changes noted to   the mitral valve leaflets with preserved leaflet excursion. No mitral   regurgitation is present.   EA reversal of the mitral inflow consistent with reduced compliance of the   left ventricle.   The aortic valve is not well visualized, appears mildly sclerotic. Valve   opening seems to be normal.   No aortic regurgitation is present.   The tricuspid valve is not well visualized, but is probably normal.   No tricuspid valve regurgitation is present.   Pulmonic valve not well seen.   The left atrium is mildly dilated.   Mild septal left ventricular hypertrophy is present.   Estimated left ventricular ejection fraction is 60 %.   Normal appearing right atrium.          < from: Xray Chest 1 View-PORTABLE IMMEDIATE (03.02.20 @ 18:33) >  P view of the chest from 1818 hours:     COMPARISON:  October 17, 2019, August 21, 2018, report fromCT otherwise chest May 25, 2007    The cardiac, hilar and mediastinal contours are unremarkable. 8 mm nodular opacity in the left midlung, unchanged from prior chest x-ray. The lungs are clear. The osseous structures demonstrate no acute abnormality.    IMPRESSION:    8 mm nodule in the left mid lung, unchanged since August 21, 2018. Recommend chest CT to better evaluate.  No acute disease.            DVT PPX:  Heparin 5000 units SC q 8 SUBJECTIVE:   89M w/ Hx of HTN, HLD, CAD s/p cardiac stent placement, colon ca s/p resection, hypothyroidism presents to the ED following 3 days hx of non productive cough, and progressively worsening lethargy and weakness. Brought in by daughter after noticing he appeared "sick" and more "difficult to arouse than usual". Recent sick contacts with Son (flu) and coworker (unknown illness).     3/- Patient seen and examined at bedside today. Febrile this morning to 103.9    CONSTITUTIONAL: No weakness, fevers or chills  EYES/ENT: No visual changes;  No vertigo or throat pain   NECK: No pain or stiffness  RESPIRATORY: No cough, wheezing, hemoptysis; No shortness of breath  CARDIOVASCULAR: No chest pain or palpitations  GASTROINTESTINAL: No abdominal or epigastric pain. No nausea, vomiting, or hematemesis; No diarrhea or constipation. No melena or hematochezia.  GENITOURINARY: No dysuria, frequency or hematuria  NEUROLOGICAL: No numbness or weakness  SKIN: No itching, burning, rashes, or lesions   All other review of systems is negative unless indicated above    Vital Signs Last 24 Hrs  T(C): 39.9 (06 Mar 2020 05:20), Max: 39.9 (06 Mar 2020 05:20)  T(F): 103.9 (06 Mar 2020 05:20), Max: 103.9 (06 Mar 2020 05:20)  HR: 78 (06 Mar 2020 05:20) (66 - 78)  BP: 142/65 (06 Mar 2020 05:20) (117/48 - 142/65)  BP(mean): --  RR: 18 (06 Mar 2020 05:20) (17 - 18)  SpO2: 96% (06 Mar 2020 05:20) (96% - 98%)    I&O's Summary    05 Mar 2020 07:01  -  06 Mar 2020 07:00  --------------------------------------------------------  IN: 1550 mL / OUT: 650 mL / NET: 900 mL        CAPILLARY BLOOD GLUCOSE          PHYSICAL EXAM:    Constitutional: NAD, awake and alert, obese, warm to touch  HEENT: PERR, EOMI, Hard of hearing, MMM , poor dentition  Neck: Soft and supple, No LAD, No JVD  Respiratory: slight expiratory wheezing, no cough, crackles or rhonchi  Cardiovascular: S1 and S2, regular rate and rhythm, no Murmurs, gallops or rubs  Gastrointestinal: Bowel Sounds present, soft, nontender, nondistended, no guarding, no rebound  Extremities: No peripheral edema  Vascular: 2+ peripheral pulses  Neurological: A/O x 3, no focal deficits  Musculoskeletal: 5/5 strength b/l upper and lower extremities  Skin: No rashes    MEDICATIONS:  MEDICATIONS  (STANDING):  amLODIPine   Tablet 2.5 milliGRAM(s) Oral daily  aspirin  chewable 81 milliGRAM(s) Oral daily  cefTRIAXone Injectable. 1000 milliGRAM(s) IV Push every 24 hours  heparin  Injectable 5000 Unit(s) SubCutaneous three times a day  levothyroxine 50 MICROGram(s) Oral daily  pantoprazole    Tablet 40 milliGRAM(s) Oral before breakfast  potassium chloride    Tablet ER 40 milliEquivalent(s) Oral once  predniSONE   Tablet 5 milliGRAM(s) Oral daily  simvastatin 40 milliGRAM(s) Oral at bedtime  sodium chloride 0.9%. 1000 milliLiter(s) (100 mL/Hr) IV Continuous <Continuous>      LABS: All Labs Reviewed:                        11.5   5.18  )-----------( 74       ( 06 Mar 2020 07:03 )             34.2     03-06    133<L>  |  103  |  14  ----------------------------<  86  3.3<L>   |  22  |  1.05    Ca    7.7<L>      06 Mar 2020 07:03    TPro  6.8  /  Alb  2.9<L>  /  TBili  0.5  /  DBili  x   /  AST  56<H>  /  ALT  55  /  AlkPhos  34<L>  03-05          Blood Culture: 03-04 @ 13:40  Organism --  Gram Stain Blood -- Gram Stain --  Specimen Source .Blood None  Culture-Blood --    03-04 @ 11:33  Organism --  Gram Stain Blood -- Gram Stain --  Specimen Source .Blood None  Culture-Blood --    03-02 @ 19:34  Organism --  Gram Stain Blood -- Gram Stain --  Specimen Source .Urine None  Culture-Blood --    03-02 @ 17:53  Organism Blood Culture PCR  Gram Stain Blood -- Gram Stain   Growth in aerobic bottle:  Gram positive cocci in pairs  Specimen Source .Blood Blood-Peripheral  Culture-Blood --    Sedimentation Rate, Erythrocyte (03.06.20 @ 07:03)    Sedimentation Rate, Erythrocyte: 46 mm/hr        RADIOLOGY/EKG:  < from: CT Chest, Abdomen and Pelvis No Cont (03.03.20 @ 16:18) >  Only a tiny nodule in the lingula, unchanged since the prior study.  Other findings as above. No evidence of malignancy in the abdomen or pelvis.          < from: TTE Echo Complete w/o contrast w/ Doppler (03.03.20 @ 09:09) >  The mitral valve was not well visualized. Fibrocalcific changes noted to   the mitral valve leaflets with preserved leaflet excursion. No mitral   regurgitation is present.   EA reversal of the mitral inflow consistent with reduced compliance of the   left ventricle.   The aortic valve is not well visualized, appears mildly sclerotic. Valve   opening seems to be normal.   No aortic regurgitation is present.   The tricuspid valve is not well visualized, but is probably normal.   No tricuspid valve regurgitation is present.   Pulmonic valve not well seen.   The left atrium is mildly dilated.   Mild septal left ventricular hypertrophy is present.   Estimated left ventricular ejection fraction is 60 %.   Normal appearing right atrium.          < from: Xray Chest 1 View-PORTABLE IMMEDIATE (03.02.20 @ 18:33) >  P view of the chest from 1818 hours:     COMPARISON:  October 17, 2019, August 21, 2018, report fromCT otherwise chest May 25, 2007    The cardiac, hilar and mediastinal contours are unremarkable. 8 mm nodular opacity in the left midlung, unchanged from prior chest x-ray. The lungs are clear. The osseous structures demonstrate no acute abnormality.    IMPRESSION:    8 mm nodule in the left mid lung, unchanged since August 21, 2018. Recommend chest CT to better evaluate.  No acute disease.            DVT PPX:  Heparin 5000 units SC q 8 SUBJECTIVE:   89M w/ Hx of HTN, HLD, CAD s/p cardiac stent placement, colon ca s/p resection, hypothyroidism presents to the ED following 3 days hx of non productive cough, and progressively worsening lethargy and weakness. Brought in by daughter after noticing he appeared "sick" and more "difficult to arouse than usual". Recent sick contacts with Son (flu) and coworker (unknown illness).     3/- Patient seen and examined at bedside today. Febrile this morning to 103.9 for which IV tylenol x1 dose was given. ESR and procalcitonin levels were obtained this AM to evaluate for possible cause of pt's fever spikes given g/o polymyalgia rheumatica (on steroid) and negative infectious workup so far.    CONSTITUTIONAL: No weakness, fevers or chills  EYES/ENT: No visual changes;  No vertigo or throat pain   NECK: No pain or stiffness  RESPIRATORY: No cough, wheezing, hemoptysis; No shortness of breath  CARDIOVASCULAR: No chest pain or palpitations  GASTROINTESTINAL: No abdominal or epigastric pain. No nausea, vomiting, or hematemesis; No diarrhea or constipation. No melena or hematochezia.  GENITOURINARY: No dysuria, frequency or hematuria  NEUROLOGICAL: No numbness or weakness  SKIN: No itching, burning, rashes, or lesions   All other review of systems is negative unless indicated above    Vital Signs Last 24 Hrs  T(C): 39.9 (06 Mar 2020 05:20), Max: 39.9 (06 Mar 2020 05:20)  T(F): 103.9 (06 Mar 2020 05:20), Max: 103.9 (06 Mar 2020 05:20)  HR: 78 (06 Mar 2020 05:20) (66 - 78)  BP: 142/65 (06 Mar 2020 05:20) (117/48 - 142/65)  BP(mean): --  RR: 18 (06 Mar 2020 05:20) (17 - 18)  SpO2: 96% (06 Mar 2020 05:20) (96% - 98%)    I&O's Summary    05 Mar 2020 07:01  -  06 Mar 2020 07:00  --------------------------------------------------------  IN: 1550 mL / OUT: 650 mL / NET: 900 mL        CAPILLARY BLOOD GLUCOSE          PHYSICAL EXAM:    Constitutional: NAD, awake and alert, obese, warm to touch  HEENT: PERR, EOMI, Hard of hearing, MMM , poor dentition  Neck: Soft and supple, No LAD, No JVD  Respiratory: slight expiratory wheezing, no cough, crackles or rhonchi  Cardiovascular: S1 and S2, regular rate and rhythm, no Murmurs, gallops or rubs  Gastrointestinal: Bowel Sounds present, soft, nontender, nondistended, no guarding, no rebound  Extremities: No peripheral edema  Vascular: 2+ peripheral pulses  Neurological: A/O x 3, no focal deficits  Musculoskeletal: 5/5 strength b/l upper and lower extremities  Skin: No rashes    MEDICATIONS:  MEDICATIONS  (STANDING):  amLODIPine   Tablet 2.5 milliGRAM(s) Oral daily  aspirin  chewable 81 milliGRAM(s) Oral daily  cefTRIAXone Injectable. 1000 milliGRAM(s) IV Push every 24 hours  heparin  Injectable 5000 Unit(s) SubCutaneous three times a day  levothyroxine 50 MICROGram(s) Oral daily  pantoprazole    Tablet 40 milliGRAM(s) Oral before breakfast  potassium chloride    Tablet ER 40 milliEquivalent(s) Oral once  predniSONE   Tablet 5 milliGRAM(s) Oral daily  simvastatin 40 milliGRAM(s) Oral at bedtime  sodium chloride 0.9%. 1000 milliLiter(s) (100 mL/Hr) IV Continuous <Continuous>      LABS: All Labs Reviewed:                        11.5   5.18  )-----------( 74       ( 06 Mar 2020 07:03 )             34.2     03-06    133<L>  |  103  |  14  ----------------------------<  86  3.3<L>   |  22  |  1.05    Ca    7.7<L>      06 Mar 2020 07:03    TPro  6.8  /  Alb  2.9<L>  /  TBili  0.5  /  DBili  x   /  AST  56<H>  /  ALT  55  /  AlkPhos  34<L>  03-05          Blood Culture: 03-04 @ 13:40  Organism --  Gram Stain Blood -- Gram Stain --  Specimen Source .Blood None  Culture-Blood --    03-04 @ 11:33  Organism --  Gram Stain Blood -- Gram Stain --  Specimen Source .Blood None  Culture-Blood --    03-02 @ 19:34  Organism --  Gram Stain Blood -- Gram Stain --  Specimen Source .Urine None  Culture-Blood --    03-02 @ 17:53  Organism Blood Culture PCR  Gram Stain Blood -- Gram Stain   Growth in aerobic bottle:  Gram positive cocci in pairs  Specimen Source .Blood Blood-Peripheral  Culture-Blood --    Sedimentation Rate, Erythrocyte (03.06.20 @ 07:03)    Sedimentation Rate, Erythrocyte: 46 mm/hr        RADIOLOGY/EKG:  < from: CT Chest, Abdomen and Pelvis No Cont (03.03.20 @ 16:18) >  Only a tiny nodule in the lingula, unchanged since the prior study.  Other findings as above. No evidence of malignancy in the abdomen or pelvis.          < from: TTE Echo Complete w/o contrast w/ Doppler (03.03.20 @ 09:09) >  The mitral valve was not well visualized. Fibrocalcific changes noted to   the mitral valve leaflets with preserved leaflet excursion. No mitral   regurgitation is present.   EA reversal of the mitral inflow consistent with reduced compliance of the   left ventricle.   The aortic valve is not well visualized, appears mildly sclerotic. Valve   opening seems to be normal.   No aortic regurgitation is present.   The tricuspid valve is not well visualized, but is probably normal.   No tricuspid valve regurgitation is present.   Pulmonic valve not well seen.   The left atrium is mildly dilated.   Mild septal left ventricular hypertrophy is present.   Estimated left ventricular ejection fraction is 60 %.   Normal appearing right atrium.          < from: Xray Chest 1 View-PORTABLE IMMEDIATE (03.02.20 @ 18:33) >  P view of the chest from 1818 hours:     COMPARISON:  October 17, 2019, August 21, 2018, report fromCT otherwise chest May 25, 2007    The cardiac, hilar and mediastinal contours are unremarkable. 8 mm nodular opacity in the left midlung, unchanged from prior chest x-ray. The lungs are clear. The osseous structures demonstrate no acute abnormality.    IMPRESSION:    8 mm nodule in the left mid lung, unchanged since August 21, 2018. Recommend chest CT to better evaluate.  No acute disease.            DVT PPX:  Heparin 5000 units SC q 8

## 2020-03-06 NOTE — PROGRESS NOTE ADULT - PROBLEM SELECTOR PLAN 4
- Likely due to acute infxn,   - more hyponatremic since admission and poor PO intake - continue NS @ 100   - f/u urine Na and urine osmolality  - Nutrition consult recommendations made - will add multivit, etc  -  Vitamin B12, Serum: 424 pg/mL   - Consider outpatient sleep study in light of daytime somnolence, body habitus  - PT Consult- recs MARCELO on discharge  - Pt has hx of PMR - continue prednisone 5 qd - Likely due to acute infxn or inflammatory process  - Urine Na and urine osmolality - WNL  - Nutrition consult recommendations made - will add multivit, etc  -  Vitamin B12, Serum: 424 pg/mL   - Consider outpatient sleep study in light of daytime somnolence, body habitus  - PT Consult- recs MARCELO on discharge  - Pt has hx of PMR - increase prednisone ? - Likely due to acute infxn or inflammatory process  - Urine Na and urine osmolality - WNL  - Nutrition consult recommendations made - will add multivit, etc  -  Vitamin B12, Serum: 424 pg/mL   - Consider outpatient sleep study in light of daytime somnolence, body habitus  - PT Consult- recs MARCELO on discharge  - Pt has hx of PMR - continue prednisone 5mg - Likely due to acute infxn or inflammatory process- infectious workup so far negative  - Urine Na and urine osmolality - WNL  - Nutrition consult recommendations made - will add multivit, etc  -  Vitamin B12, Serum: 424 pg/mL   - Consider outpatient sleep study in light of daytime somnolence, body habitus  - PT Consult- recs MARCELO on discharge  - Pt has hx of PMR - continue prednisone 5mg

## 2020-03-06 NOTE — PROGRESS NOTE ADULT - ASSESSMENT
89 yo M with Hx of HTN, HLD, CAD s/p cardiac stent placement, colon ca s/p resection, hypothyroidism presents with 3 day history of progressively worsening weakness and lethargy with associated dry cough. She notes patient generally sleeps alot during the day and easily falls asleep but notes that he was very lethargic requiring more effort to be aroused.  Daughter describes pt having a sick appearance. Pt has had decreased appetite. Pt has 2 recent sick contacts, son had flu and family co-worker was very ill recently. He is constipated intermittently, for which he has taken milk of magnesia. Denies any recent hospitalizations. Pt last travelled in Jan 2020 to [place] and has plans to travel there this week. In the ED pt was hypoxic to 89% on RA. Pt was given 3.1 L bolus of LR and IV ceftriaxone/azithromycin. Noted initially febrile 102, nl wbc ct, CT chest/abd/pelvis RML-RLL atelectasis, lingular pulm nodule.     1. fever. hypoxia. RML-RLL atelectasis vs. pna. positive blood culture  -  fevers noted, elevated ESR ? PMR flare   - 1 bottle blood cx CONS c/w contaminant  - on rocephin 1 gm daily #5/5  - on azithro 500mg daily #3/3  - ua/urine cx, RVP unremarkable  - repeat blood cx no growth   - ddx inflammatory hx pmr, possible rheum eval  - if persistent fever consider repeat xray/blood cx, if ms changes may need to consider LP  - supportive care  - fu cbc    2. other issues - care per medicine

## 2020-03-06 NOTE — PROGRESS NOTE ADULT - PROBLEM SELECTOR PLAN 3
-Resolved; likely prerenal 2/2 hypovolemia from dehydration  - Cr 1.33 on admission (baseline Cr 1.0-1.2) currently 0.96  - encourage PO intake  - NS 100ml/hr 2/2 poor PO intake - D/C when PO intake improves -Resolved; likely prerenal 2/2 hypovolemia from dehydration  - Cr 1.33 on admission (baseline Cr 1.0-1.2) currently 0.96  - NS 100ml/hr  D/C - PO intake improved

## 2020-03-06 NOTE — PROGRESS NOTE ADULT - PROBLEM SELECTOR PLAN 2
- Continue IV Ceftriaxone  - Azithromycin 500 mg PO x 3 days - course completed today 3/5  - Cont. motrin/tylenol for fevers  - F/U Sputum culture -pending collection  -1 bottle of initial Bcx  (3/2) with coag neg staph, likely contaminant  - f/u repeat BCx from 3/5 - Continue IV Ceftriaxone x 5 days -DAY 4  - Azithromycin 500 mg PO x 3 days - course completed 3/5  - Cont. motrin/tylenol for fevers  -1 bottle of initial Bcx  (3/2) with coag neg staph, likely contaminant  - Repeat BCx from 3/4 - Negative  - fevers continued- elevated ESR , likely PMR flare - Continue IV Ceftriaxone x 5 days -DAY 4; will discontinue after last dose  - s/p Azithromycin 500 mg PO x 3 days - course completed 3/5  - Cont. motrin/tylenol for fevers  -1 bottle of initial Bcx  (3/2) with coag neg staph, likely contaminant  - Repeat BCx from 3/4 - Negative  - fevers continued- elevated ESR (46), ?likely PMR flare; will await procalcitonin level and consider Rheum consult if low.

## 2020-03-06 NOTE — PROGRESS NOTE ADULT - PROBLEM SELECTOR PLAN 6
- ALT remains mildly elevated- overall downtrending  - Trend LFTs - AST wnl, ALT overall downtrending

## 2020-03-06 NOTE — PROGRESS NOTE ADULT - PROBLEM SELECTOR PLAN 5
- PLTs 121 on admission, currently 90 -> 86 on 3/5  - continue to trend plts - PLTs 121 on admission, currently 90 -> 86 on 3/5 -> 74 on  3/6  - continue to trend plts

## 2020-03-06 NOTE — CHART NOTE - NSCHARTNOTEFT_GEN_A_CORE
Pt seen and examined with house staff.  Plan formulated and reviewed on rounds    Briefly,   89 y/o male with HTN and CAD admitted with CAP sepsis--Bcx + for CNS in bottle--repeat BCx sent (NTD).  RVP negative. UA negative  On CTX and azithromycin (stopped)  Daughter at bedside  Slightly confused overnight--better this morning    Awake and alert  NAD  stable vitals  Tm 103.8  Non toxic appearing    + fine exp wheezing    Not CAP given XR  Fever of unknown origin--?? Rheumatology disease flare--ESR 46 on steroids    Repeat BCx--follow up--NTD  CTX/azithromycin (5) (2)--stop after last CTX dose today  Check PCT level  ID follow up  No further w/u of stable lingula nodule  Prednisone for PMR--consider obtaining Rheu consult if PCT level is low  DVT prophy--sqhep  DC IVF today--give dose of furosemide now and as needed     Above d/w daughters at bedside-- All concerns addressed including but not limited to diagnosis, treatment plan and overall prognosis.

## 2020-03-06 NOTE — PROGRESS NOTE ADULT - ASSESSMENT
89 yo M as described above presenting with 3 day history of weakness/lethargy with associated nonproductive cough and decreased PO intake admitted for acute hypoxic respiratory failure secondary to suspected Community Acquired Pneumonia and CRAIG secondary to poor PO intake.        #DVT PPx  - IMPROVE Score: 2  - Heparin SQ    #Advanced directives  - HCP: daughter Aleja  - FULL CODE

## 2020-03-06 NOTE — PROGRESS NOTE ADULT - SUBJECTIVE AND OBJECTIVE BOX
Date of service: 20 @ 11:52    pt seen and examined  febrile o/n and this am  confused with temps  denies cough, no diarrhea, voiding     ROS: denies dizziness, no HA, no abdominal pain, no diarrhea or constipation; no dysuria, no urinary frequency, no legs pain, no rashes    MEDICATIONS  (STANDING):  amLODIPine   Tablet 2.5 milliGRAM(s) Oral daily  aspirin  chewable 81 milliGRAM(s) Oral daily  cefTRIAXone Injectable. 1000 milliGRAM(s) IV Push every 24 hours  heparin  Injectable 5000 Unit(s) SubCutaneous three times a day  levothyroxine 50 MICROGram(s) Oral daily  pantoprazole    Tablet 40 milliGRAM(s) Oral before breakfast  predniSONE   Tablet 5 milliGRAM(s) Oral daily  simvastatin 40 milliGRAM(s) Oral at bedtime    Vital Signs Last 24 Hrs  T(C): 36.8 (06 Mar 2020 11:19), Max: 39.9 (06 Mar 2020 05:20)  T(F): 98.3 (06 Mar 2020 11:19), Max: 103.9 (06 Mar 2020 05:20)  HR: 66 (06 Mar 2020 11:29) (65 - 78)  BP: 110/39 (06 Mar 2020 11:29) (102/41 - 142/65)  BP(mean): --  RR: 18 (06 Mar 2020 11:19) (18 - 18)  SpO2: 95% (06 Mar 2020 11:19) (95% - 98%)      PE:  Constitutional: frail looking  HEENT: NC/AT, EOMI, PERRLA, conjunctivae clear; ears and nose atraumatic; pharynx benign  Neck: supple; thyroid not palpable  Back: no tenderness  Respiratory: clear to auscultation  Cardiovascular: S1S2 regular, no murmurs  Abdomen: soft, not tender, not distended, positive BS; liver and spleen WNL  Genitourinary: no suprapubic tenderness  Lymphatic: no LN palpable  Musculoskeletal: no muscle tenderness, no joint swelling or tenderness  Extremities: no pedal edema  Neurological/ Psychiatric:  moving all extremities  Skin: no rashes; no palpable lesions    Labs: all available labs reviewed                                              11.5   5.18  )-----------( 74       ( 06 Mar 2020 07:03 )             34.2     03-06    133<L>  |  103  |  14  ----------------------------<  86  3.3<L>   |  22  |  1.05    Ca    7.7<L>      06 Mar 2020 07:03    TPro  6.8  /  Alb  2.9<L>  /  TBili  0.5  /  DBili  x   /  AST  56<H>  /  ALT  55  /  AlkPhos  34<L>  03-05        Color: Yellow / Appearance: Clear / S.010 / pH: x  Gluc: x / Ketone: Negative  / Bili: Negative / Urobili: Negative mg/dL   Blood: x / Protein: 15 mg/dL / Nitrite: Negative   Leuk Esterase: Trace / RBC: 3-5 /HPF / WBC 0-2   Sq Epi: x / Non Sq Epi: Occasional / Bacteria: Negative      Culture - Urine (20 @ 19:34)    Specimen Source: .Urine None    Culture Results:   >=3 organisms. Probable collection contamination.    Culture - Blood (20 @ 17:53)    -  Coagulase negative Staphylococcus: Detec    Gram Stain:   Growth in aerobic bottle:  Gram positive cocci in pairs    Specimen Source: .Blood Blood-Peripheral    Organism: Blood Culture PCR    Culture Results:   Growth in aerobic bottle:  Gram positive cocci in pairs  ***Blood Panel PCR results on this specimen are available  approximately 3 hours after the Gram stain result.***  Gram stain, PCR, and/or culture results may not always  correspond due to difference in methodologies.  ************************************************************  This PCR assay was performed using [employer].  The following targets are tested for: Enterococcus,  vancomycin resistant enterococci, Listeria monocytogenes,  coagulase negative staphylococci, S. aureus,  methicillin resistant S. aureus, Streptococcus agalactiae  (Group B), S. pneumoniae, S. pyogenes (Group A),  Acinetobacter baumannii, Enterobacter cloacae, E. coli,  Klebsiella oxytoca, K. pneumoniae,Proteus sp.,  Serratia marcescens, Haemophilus influenzae,  Neisseria meningitidis, Pseudomonas aeruginosa, Candida  albicans, C. glabrata, C krusei, C parapsilosis,  C. tropicalis and the KPC resistance gene.    Organism Identification: Blood Culture PCR    Method Type: PCR        Radiology: all available radiological tests reviewed  < from: CT Abdomen and Pelvis No Cont (20 @ 16:18) >  EXAM:  CT ABDOMEN AND PELVIS                          EXAM:  CT CHEST                            PROCEDURE DATE:  2020          INTERPRETATION:  Chest CT without contrast dated 3/3/2020.    COMPARISON: 2017.  CLINICAL INFORMATION: History of colon CA and a stable lung nodule.    TECHNIQUE: Contiguous axial 2.5 mm slice thickness images of the chest were obtained without intravenous contrast administration.    FINDINGS:  Thyroid lobes are unremarkable.  The airway shows normal caliberand contour with patent lumen.  There is elevation of the right hemidiaphragm and some atelectatic changes in the right lower lobe and the right middle lobe.. A1.2 cm calcified nodule in the posterior lingula. A 4 mm nodule in the lingula on image 2-39 is unchanged. There are linear atelectatic changes at the left lung base.    There is no pleural effusion or pneumothorax.    There are no mediastinal lymphadenopathy or masses.    The mediastinum great vessels , calcified plaques involving the aorta and calcified coronary arteries.     The heart is normal. There is no pericardial effusion.    The bones , status post replacement of the proximal right humerus. Degenerative changes in the left glenohumeral joint.     IMPRESSION:   Only a tiny nodule in the lingula, unchanged since the prior study.  Other findings as above.    Non contrast CT of the abdomen and pelvis dated 3/3/2020.     COMPARISON: 2017.    CLINICAL HISTORY: History of colon CA.    Technique: contiguous axial images wereobtained with 2.5 mm slice thickness without intravenous or oral contrast administration, which limits the visualization of the intra-abdominal structures.   Coronal and sagittal reformats were also submitted for interpretation.      FINDINGS:     There is no free intra-abdominal air or ascites.     The unopacified liver, the configuration of the liver is suggestive of liver cirrhosis. This is manifested by hypertrophy of the left lobe and the caudate lobe.     The spleen, pancreas, adrenal glands and gallbladder are normal.     There is no intra or extrahepatic biliary ductal dilatation.    The unopacified stomach, duodenum, small bowel are normal. Anastomotic site is visualized in the distal transverse colon. Colonic diverticulosis.    Bothkidneys , a 2.4 cm hypodense lesion in the lateral cortex of the left kidney, cannot be characterized due to the noncontrast technique. This hypodense lesion is slightly larger as compared to the prior study. Ultrasound would be of value for further evaluation if clinically indicated.     The urinary bladder shows normal morphology and contour.      The reproductive organs , seminal vesicles are unremarkable and prostate is not enlarged.     There are no retroperitoneal masses or lymphadenopathy.    The retroperitoneal vascular structures , abdominal aorta and iliac arteries are heavily calcified.     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    IMPRESSION:   No evidence of malignancy in the abdomen or pelvis.   Findings as above.    < end of copied text >    Advanced directives addressed: full resuscitation

## 2020-03-07 DIAGNOSIS — R50.9 FEVER, UNSPECIFIED: ICD-10-CM

## 2020-03-07 LAB
ANION GAP SERPL CALC-SCNC: 7 MMOL/L — SIGNIFICANT CHANGE UP (ref 5–17)
BUN SERPL-MCNC: 18 MG/DL — SIGNIFICANT CHANGE UP (ref 7–23)
CALCIUM SERPL-MCNC: 8.1 MG/DL — LOW (ref 8.5–10.1)
CHLORIDE SERPL-SCNC: 105 MMOL/L — SIGNIFICANT CHANGE UP (ref 96–108)
CO2 SERPL-SCNC: 23 MMOL/L — SIGNIFICANT CHANGE UP (ref 22–31)
CREAT SERPL-MCNC: 1.17 MG/DL — SIGNIFICANT CHANGE UP (ref 0.5–1.3)
GLUCOSE SERPL-MCNC: 92 MG/DL — SIGNIFICANT CHANGE UP (ref 70–99)
HCT VFR BLD CALC: 37.9 % — LOW (ref 39–50)
HGB BLD-MCNC: 12.1 G/DL — LOW (ref 13–17)
MCHC RBC-ENTMCNC: 28.5 PG — SIGNIFICANT CHANGE UP (ref 27–34)
MCHC RBC-ENTMCNC: 31.9 GM/DL — LOW (ref 32–36)
MCV RBC AUTO: 89.4 FL — SIGNIFICANT CHANGE UP (ref 80–100)
PLATELET # BLD AUTO: 75 K/UL — LOW (ref 150–400)
POTASSIUM SERPL-MCNC: 3.8 MMOL/L — SIGNIFICANT CHANGE UP (ref 3.5–5.3)
POTASSIUM SERPL-SCNC: 3.8 MMOL/L — SIGNIFICANT CHANGE UP (ref 3.5–5.3)
RBC # BLD: 4.24 M/UL — SIGNIFICANT CHANGE UP (ref 4.2–5.8)
RBC # FLD: 14 % — SIGNIFICANT CHANGE UP (ref 10.3–14.5)
SODIUM SERPL-SCNC: 135 MMOL/L — SIGNIFICANT CHANGE UP (ref 135–145)
WBC # BLD: 6.27 K/UL — SIGNIFICANT CHANGE UP (ref 3.8–10.5)
WBC # FLD AUTO: 6.27 K/UL — SIGNIFICANT CHANGE UP (ref 3.8–10.5)

## 2020-03-07 PROCEDURE — 99223 1ST HOSP IP/OBS HIGH 75: CPT

## 2020-03-07 PROCEDURE — 99233 SBSQ HOSP IP/OBS HIGH 50: CPT | Mod: GC

## 2020-03-07 RX ORDER — IBUPROFEN 200 MG
400 TABLET ORAL ONCE
Refills: 0 | Status: COMPLETED | OUTPATIENT
Start: 2020-03-07 | End: 2020-03-07

## 2020-03-07 RX ADMIN — Medication 400 MILLIGRAM(S): at 11:20

## 2020-03-07 RX ADMIN — Medication 650 MILLIGRAM(S): at 06:13

## 2020-03-07 RX ADMIN — Medication 81 MILLIGRAM(S): at 11:24

## 2020-03-07 RX ADMIN — Medication 5 MILLIGRAM(S): at 05:51

## 2020-03-07 RX ADMIN — Medication 3 MILLILITER(S): at 06:22

## 2020-03-07 RX ADMIN — AMLODIPINE BESYLATE 2.5 MILLIGRAM(S): 2.5 TABLET ORAL at 05:50

## 2020-03-07 RX ADMIN — Medication 50 MICROGRAM(S): at 05:51

## 2020-03-07 RX ADMIN — PANTOPRAZOLE SODIUM 40 MILLIGRAM(S): 20 TABLET, DELAYED RELEASE ORAL at 05:53

## 2020-03-07 RX ADMIN — Medication 650 MILLIGRAM(S): at 05:49

## 2020-03-07 RX ADMIN — Medication 400 MILLIGRAM(S): at 10:02

## 2020-03-07 NOTE — PROGRESS NOTE ADULT - PROBLEM SELECTOR PLAN 5
- Likely due to acute infxn or inflammatory process- infectious workup so far negative  - Urine Na and urine osmolality - WNL  - Nutrition consult recommendations made -add multivit, etc  -  Vitamin B12, Serum: 424 pg/mL   - Consider outpatient sleep study in light of daytime somnolence, body habitus  - PT Consult- recs MARCELO on discharge  - Pt has hx of PMR - continue prednisone 5mg

## 2020-03-07 NOTE — CONSULT NOTE ADULT - ASSESSMENT
89 yo M with Hx of HTN, HLD, CAD s/p cardiac stent placement, colon ca s/p resection, hypothyroidism, mild thrombocytopenia (121 on adm)  and PMR on 5 mg prednisone...  presents with 5 day history of intermittent high fever and  progressively generalized  weakness, lethargy, shortness of breath and a dry non productive cough.  No family present for history, poor historian, unable to answer most questions.   History reviewed from notes:    Lives at home with his daughter Tracy reports that he has been sleeping alot, lethargic and at times difficult to arouse  On this hospitalization, repeatedly notes altered mental status with fevers.    Since adm has had recurrent fever spikes to 101-103 despite APAP and Ibuprofen.    Also note decreased appetite.   Pt has 2 recent sick contacts, son had flu (unclear if seropositive) and family co-worker was very ill recently.   Has denied nausea, vomiting or diarrhea. He is constipated intermittently, for which he has taken milk of magnesia.   Denies any recent hospitalizations. Pt last travelled in Jan 2020 to [place] and has plans to travel there this week.     In the ED pt was hypoxic to 89% on RA. Pt was given 3.1 L bolus of LR and IV ceftriaxone/azithromycin. (02 Mar 2020 23:16)... over past few days intermittently struggles with resp distress, diffuse wheezing, decreased activity tolerance and O2 sat w/ supplemental O2 93-96% on average.    Ctscan:  diffuse inflammatory changes in RML/ RLL most significant finding w/ no lymphadenopathy   CTScan abd:  + cirrhosis but otherwise no lymphadenopathy/ masses to suggest malignant process.   Labs this adm:    ESR 46 w/ Procacitonin 0.24  Neg RVP   AST 56 otherwise nl CMP  stable CBC except progressive drop in Plt (has stopped heparin), no leukocytosis/ anemia    neg Urine/ blood cx (except one that was suggestive of possible contamination)    Discussion/ DDx:   - PMR/ GCA: Though has hx of PMR, there is no evidence on exam to suggest active disease w/nearly full ROM shoulders/ hips, also little to suggest GCA with no visual disturbance, HA or cp/ arm pain or abd migraine sx.  Though PMR/ GCA can present with flulike sx, cough is unlikely to be a presenting and fevers to 103 also unlikely.   Will check CRP and Interleukin 6 as well as CK .. especially given sl elevated LFT and low plt  - other CTD r/t inflammatory lung process:  no hemoptysis or hx sinus infections/ asthma and no renal dysfunction to suggest vasculitis but will check ANCA, RF, MONTSE in am  - Autoinflammatory process:  unlikely given age and sudden onset but will check ferritin  - Paraneoplastic:  hx colon cancer but CTscan chest/ abd / pelvis without considerable lymphadenopathy and no masses to suggest. Will check SPEP as well   - Infectious:  thus far blood/ urine negative.  Chest CTscan suggest inflammatory pattern in RML/ RLL not c/w bacterial infection.  RVP neg but this does not cover COVID 19.  PUlmonary status on exam tonight was c/w diffuse wheezing insp/ exp and decreased on RLL.  O2 sats over past several days on suppl O2 93-98%     Plan:   - additional labs as noted  - ? should he be isolated, if fever persists and no clear explanation   - will follow 89 yo M with Hx of HTN, HLD, CAD s/p cardiac stent placement, colon ca s/p resection, hypothyroidism, mild thrombocytopenia (121 on adm)  and PMR on 5 mg prednisone...  presents with 5 day history of intermittent high fever and  progressively generalized  weakness, lethargy, shortness of breath and a dry non productive cough.  No family present for history, poor historian, unable to answer most questions.   History reviewed from notes:    Lives at home with his daughter Tracy reports that he has been sleeping alot, lethargic and at times difficult to arouse  On this hospitalization, repeatedly notes altered mental status with fevers.    Since adm has had recurrent fever spikes to 101-103 despite APAP and Ibuprofen.    Also note decreased appetite.   Pt has 2 recent sick contacts, son had flu (unclear if seropositive) and family co-worker was very ill recently.   Has denied nausea, vomiting or diarrhea. He is constipated intermittently, for which he has taken milk of magnesia.   Denies any recent hospitalizations. Pt last travelled in Jan 2020 to [place] and has plans to travel there this week.     In the ED pt was hypoxic to 89% on RA. Pt was given 3.1 L bolus of LR and IV ceftriaxone/azithromycin. (02 Mar 2020 23:16)... over past few days intermittently struggles with resp distress, diffuse wheezing, decreased activity tolerance and O2 sat w/ supplemental O2 93-96% on average.    Ctscan:  diffuse inflammatory changes in RML/ RLL most significant finding w/ no lymphadenopathy   CTScan abd:  + cirrhosis but otherwise no lymphadenopathy/ masses to suggest malignant process.   Labs this adm:    ESR 46 w/ Procacitonin 0.24  Neg RVP   AST 56 otherwise nl CMP  stable CBC except progressive drop in Plt (has stopped heparin), no leukocytosis/ anemia    neg Urine/ blood cx (except one that was suggestive of possible contamination)    Discussion/ DDx:   - PMR/ GCA: Though has hx of PMR, there is no evidence on exam to suggest active disease w/nearly full ROM shoulders/ hips, also little to suggest GCA with no visual disturbance, HA or cp/ arm pain or abd migraine sx.  Though PMR/ GCA can present with flulike sx, cough is unlikely to be a presenting and fevers to 103 also unlikely.   Will check CRP and Interleukin 6 as well as CK .. especially given sl elevated LFT and low plt  - other CTD r/t inflammatory lung process:  no hemoptysis or hx sinus infections/ asthma and no renal dysfunction to suggest vasculitis but will check ANCA, RF, MONTSE in am  - Autoinflammatory process:  unlikely given age and sudden onset but will check ferritin  - Paraneoplastic:  hx colon cancer but CTscan chest/ abd / pelvis without considerable lymphadenopathy and no masses to suggest. Will check SPEP as well   - Infectious:  thus far blood/ urine negative.  Chest CTscan suggest inflammatory pattern in RML/ RLL not c/w bacterial infection.  RVP neg but this does not cover COVID 19.  PUlmonary status on exam tonight was c/w diffuse wheezing insp/ exp and decreased on RLL.  O2 sats over past several days on suppl O2 93-98%     Plan:   - additional labs as noted  - ? should he be isolated, if fever persists and no clear explanation and rheum studies are otherwise neg   - will follow 89 yo M with Hx of HTN, HLD, CAD s/p cardiac stent placement, colon ca s/p resection, hypothyroidism, mild thrombocytopenia (121 on adm)  and PMR on 5 mg prednisone...  presents with 5 day history of intermittent high fever and  progressively generalized  weakness, lethargy, shortness of breath and a dry non productive cough.  No family present for history, poor historian, unable to answer most questions.   History reviewed from notes:    Lives at home with his daughter Tracy reports that he has been sleeping alot, lethargic and at times difficult to arouse  On this hospitalization, repeatedly notes altered mental status with fevers.    Since adm has had recurrent fever spikes to 101-103 despite APAP and Ibuprofen.    Also note decreased appetite.   Pt has 2 recent sick contacts, son had flu (unclear if seropositive) and family co-worker was very ill recently.   Has denied nausea, vomiting or diarrhea. He is constipated intermittently, for which he has taken milk of magnesia.   Denies any recent hospitalizations. Pt last travelled in Jan 2020 to [place] and has plans to travel there this week.     In the ED pt was hypoxic to 89% on RA. Pt was given 3.1 L bolus of LR and IV ceftriaxone/azithromycin. (02 Mar 2020 23:16)... over past few days intermittently struggles with resp distress, diffuse wheezing, decreased activity tolerance and O2 sat w/ supplemental O2 93-96% on average.    Ctscan:  diffuse inflammatory changes in RML/ RLL most significant finding w/ no lymphadenopathy   CTScan abd:  + cirrhosis but otherwise no lymphadenopathy/ masses to suggest malignant process.   Labs this adm:    ESR 46 w/ Procacitonin 0.24  Neg RVP   AST 56 otherwise nl CMP  stable CBC except progressive drop in Plt (has stopped heparin), no leukocytosis/ anemia    neg Urine/ blood cx (except one that was suggestive of possible contamination)    Discussion/ DDx:   - PMR/ GCA/ myositis : Though has hx of PMR, there is no evidence on exam to suggest active disease w/nearly full ROM shoulders/ hips, also little to suggest GCA with no visual disturbance, HA or cp/ arm pain or abd migraine sx.  Though PMR/ GCA can present with flulike sx, cough is unlikely to be a presenting and fevers to 103 also unlikely.   Will check CRP, and Interleukin 6 as well as CK .. especially given sl elevated LFT and low plt  - other CTD r/t inflammatory lung process:  no hemoptysis or hx sinus infections/ asthma and no renal dysfunction to suggest vasculitis but will check ANCA, RF, MONTSE in am  - Autoinflammatory process:  unlikely given absence of leukocytosis, though low plt can be seen and  age of onset but will check ferritin  - Paraneoplastic:  hx colon cancer but CTscan chest/ abd / pelvis without considerable lymphadenopathy and no masses to suggest. Will check SPEP as well   - Infectious:  thus far blood/ urine negative.  Chest CTscan suggest inflammatory pattern in RML/ RLL not c/w bacterial infection.  RVP neg but this does not cover COVID 19.  PUlmonary status on exam tonight was c/w diffuse wheezing insp/ exp and decreased on RLL.  O2 sats over past several days on suppl O2 93-98%     Plan:   - additional labs as noted  - ? should he be isolated, if fever persists and no clear explanation and rheum studies are otherwise neg   - will follow

## 2020-03-07 NOTE — CHART NOTE - NSCHARTNOTEFT_GEN_A_CORE
Pt seen and examined with house staff.  Plan formulated and reviewed on rounds    Briefly,   89 y/o male with HTN and CAD admitted with CAP sepsis--Bcx + for CNS in bottle--repeat BCx sent (NTD).  RVP negative. UA negative  On CTX and azithromycin (stopped)    Daughter at bedside    Awake and alert  NAD  stable vitals  Tm 103.8  Non toxic appearing    + fine exp wheezing    Not CAP given XR  Fever of unknown origin--?? Rheumatology disease flare--ESR 46 on steroids--PCT 0.24    Obtain Rheu consult--will not increase long standing prednisone 5  BCx--NTD  CTX/azithromycin (6) (2)  ID follow up  No further w/u of stable lingula nodule  DVT prophy--sqhep  PO diet  OOB and ambulate     Above d/w daughters at bedside-- All concerns addressed including but not limited to diagnosis, treatment plan and overall prognosis.

## 2020-03-07 NOTE — PROGRESS NOTE ADULT - ASSESSMENT
89 yo M as described above presenting with 3 day history of weakness/lethargy with associated nonproductive cough and decreased PO intake admitted for acute hypoxic respiratory failure secondary to initial suspicion for Community Acquired Pneumonia, CXR however without evidence of PNA, ?suspicion for possible rheumatologic etiology/flare given continue fever spike with negative infectious workup, and elevated ESR; pt with CRAIG on admission secondary to poor PO intake improved.        #DVT PPx  - IMPROVE Score: 2  - continue Heparin SQ    #Advanced directives  - HCP: daughter Laeja  - FULL CODE      - s/p IV Ceftriaxone x 5 days  - s/p Azithromycin 500 mg PO x 3 days - course completed 3/5  - Cont. motrin/tylenol for fevers  -1 bottle of initial Bcx  (3/2) with coag neg staph, likely contaminant  - Repeat BCx from 3/4 - Negative  - fevers continued- elevated ESR (46), ?likely PMR flare; procalcitonin 0.24  - Rheumatology consulted

## 2020-03-07 NOTE — CONSULT NOTE ADULT - SUBJECTIVE AND OBJECTIVE BOX
HPI:  89 yo M with Hx of HTN, HLD, CAD s/p cardiac stent placement, colon ca s/p resection, hypothyroidism, mild thrombocytopenia (121 on adm)  and PMR on 5 mg prednisone...  presents with 5 day history of intermittent high fever and  progressively generalized  weakness, lethargy, shortness of breath and a dry non productive cough.  No family present for history, poor historian, unable to answer most questions.   History reviewed from notes:    Lives at home with his daughter Tracy reports that he has been sleeping alot, lethargic and at times difficult to arouse  On this hospitalization, repeatedly notes altered mental status with fevers.    Since adm has had recurrent fever spikes to 101-103 despite APAP and Ibuprofen.    Also note decreased appetite.   Pt has 2 recent sick contacts, son had flu (unclear if seropositive) and family co-worker was very ill recently.   Has denied nausea, vomiting or diarrhea. He is constipated intermittently, for which he has taken milk of magnesia.   Denies any recent hospitalizations. Pt last travelled in Jan 2020 to [place] and has plans to travel there this week.     In the ED pt was hypoxic to 89% on RA. Pt was given 3.1 L bolus of LR and IV ceftriaxone/azithromycin. (02 Mar 2020 23:16)... over past few days intermittently struggles with resp distress, diffuse wheezing, decreased activity tolerance and O2 sat w/ supplemental O2 93-96% on average.    Ctscan:  diffuse inflammatory changes in RML/ RLL most significant finding w/ no lymphadenopathy   CTScan abd:  + cirrhosis but otherwise no lymphadenopathy/ masses to suggest malignant process.   Labs this adm:    ESR 46 w/ Procacitonin 0.24  Neg RVP   AST 56 otherwise nl CMP  stable CBC except progressive drop in Plt (has stopped heparin), no leukocytosis/ anemia    neg Urine/ blood cx (except one that was suggestive of possible contamination)       REVIEW OF SYSTEMS:  ROS limited by lethargy, mildly altered mental status...  CONSTITUTIONAL: + Generalized  weakness, + fevers or chills  EYES/ENT: Denies visual changes;  No vertigo or throat pain   NECK: No pain or stiffness  RESPIRATORY: + dry cough, + wheezing, NO hemoptysis; + shortness of breath  CARDIOVASCULAR: No chest pain or palpitations  GASTROINTESTINAL: No abdominal or epigastric pain. No nausea, vomiting, or hematemesis; No diarrhea or constipation. No melena or hematochezia.  GENITOURINARY: No dysuria, frequency or hematuria  NEUROLOGICAL: No numbness or weakness  SKIN: No itching, burning, rashes, or lesions   All other review of systems is negative unless indicated above    Vital Signs Last 24 Hrs  T(C): 36.7 (07 Mar 2020 20:03), Max: 39.9 (07 Mar 2020 05:48)  T(F): 98 (07 Mar 2020 20:03), Max: 103.8 (07 Mar 2020 05:48)  HR: 65 (07 Mar 2020 20:03) (61 - 90)  BP: 140/57 (07 Mar 2020 20:03) (103/53 - 148/55)  BP(mean): --  RR: 20 (07 Mar 2020 20:03) (18 - 20)  SpO2: 93% (07 Mar 2020 20:03) (93% - 98%)    Home Medications:  amLODIPine 2.5 mg oral tablet: 1 tab(s) orally once a day (02 Mar 2020 20:31)  aspirin 81 mg oral tablet: 1 tab(s) orally once a day (02 Mar 2020 20:31)  levothyroxine 50 mcg (0.05 mg) oral tablet: 1 tab(s) orally once a day (02 Mar 2020 20:31)  pantoprazole 40 mg oral delayed release tablet: 1 tab(s) orally once a day (02 Mar 2020 20:31)  predniSONE 5 mg oral tablet: 1 tab(s) orally once a day (02 Mar 2020 20:31)  simvastatin 40 mg oral tablet: 1 tab(s) orally once a day (at bedtime) (02 Mar 2020 20:31)    MEDICATIONS  (STANDING):  amLODIPine   Tablet 2.5 milliGRAM(s) Oral daily  aspirin  chewable 81 milliGRAM(s) Oral daily  heparin  Injectable 5000 Unit(s) SubCutaneous three times a day  levothyroxine 50 MICROGram(s) Oral daily  multivitamin 1 Tablet(s) Oral daily  pantoprazole    Tablet 40 milliGRAM(s) Oral before breakfast  predniSONE   Tablet 5 milliGRAM(s) Oral daily  simvastatin 40 milliGRAM(s) Oral at bedtime    MEDICATIONS  (PRN):  acetaminophen   Tablet .. 650 milliGRAM(s) Oral every 6 hours PRN Temp greater or equal to 38C (100.4F), Mild Pain (1 - 3)  albuterol/ipratropium for Nebulization. 3 milliLiter(s) Nebulizer every 6 hours PRN Shortness of Breath and/or Wheezing      LABS: All Labs Reviewed:                        12.1   6.27  )-----------( 75       ( 07 Mar 2020 08:19 )             37.9     03-07    135  |  105  |  18  ----------------------------<  92  3.8   |  23  |  1.17    Ca    8.1<L>      07 Mar 2020 08:19    Blood Culture: Organism --  Gram Stain Blood -- Gram Stain --  Specimen Source .Blood None  Culture-Blood --    Organism --  Gram Stain Blood -- Gram Stain --  Specimen Source .Blood None  Culture-Blood --    RADIOLOGY/EKG:  INTERPRETATION:  Chest CT without contrast dated 3/3/2020.    COMPARISON: 5/25/2017.  CLINICAL INFORMATION: History of colon CA and a stable lung nodule.    TECHNIQUE: Contiguous axial 2.5 mm slice thickness images of the chest were obtained without intravenous contrast administration.    FINDINGS:  Thyroid lobes are unremarkable.  The airway shows normal caliber and contour with patent lumen.  There is elevation of the right hemidiaphragm and some atelectatic changes in the right lower lobe and the right middle lobe.. A1.2 cm calcified nodule in the posterior lingula. A 4 mm nodule in the lingula on image 2-39 is unchanged. There are linear atelectatic changes at the left lung base.    There is no pleural effusion or pneumothorax.    There are no mediastinal lymphadenopathy or masses.    The mediastinum great vessels , calcified plaques involving the aorta and calcified coronary arteries.     The heart is normal. There is no pericardial effusion.    The bones , status post replacement of the proximal right humerus. Degenerative changes in the left glenohumeral joint.     IMPRESSION:   Only a tiny nodule in the lingula, unchanged since the prior study.  Other findings as above.    Non contrast CT of the abdomen and pelvis dated 3/3/2020.     COMPARISON: 5/25/2017.    CLINICAL HISTORY: History of colon CA.    Technique: contiguous axial images were obtained with 2.5 mm slice thickness without intravenous or oral contrast administration, which limits the visualization of the intra-abdominal structures.   Coronal and sagittal reformats were also submitted for interpretation.      FINDINGS:     There is no free intra-abdominal air or ascites.     The unopacified liver, the configuration of the liver is suggestive of liver cirrhosis. This is manifested by hypertrophy of the left lobe and the caudate lobe.     The spleen, pancreas, adrenal glands and gallbladder are normal.     There is no intra or extrahepatic biliary ductal dilatation.    The unopacified stomach, duodenum, small bowel are normal. Anastomotic site is visualized in the distal transverse colon. Colonic diverticulosis.    Both kidneys , a 2.4 cm hypodense lesion in the lateral cortex of the left kidney, cannot be characterized due to the noncontrast technique. This hypodense lesion is slightly larger as compared to the prior study. Ultrasound would be of value for further evaluation if clinically indicated.     The urinary bladder shows normal morphology and contour.      The reproductive organs , seminal vesicles are unremarkable and prostate is not enlarged.     There are no retroperitoneal masses or lymphadenopathy.    The retroperitoneal vascular structures , abdominal aorta and iliac arteries are heavily calcified.     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    IMPRESSION:   No evidence of malignancy in the abdomen or pelvis.   Findings as above.      PHYSICAL EXAM:  GEN:  comfortable, oriented to person, ? time or place... resting  HEENT- no oral lesions noted, no lymphadenoapthy noted  Heart- S1 S2 reg  Lungs- audible wheezing at rest, diffuse insp/ exp wheezing and decr bs RLL- course crackles diffusely  Abd- Soft NT  Ext- no edema  Skin- no rashes  Musculoskelatal- FROM shoulders, hips, knees limited by 10-20 degrees... no active synovitis noted  Neurological- intact strength upper and lower extremities 5/5 w/ good / ankle str, no evidence of proximal weakness.              A/P

## 2020-03-07 NOTE — PROVIDER CONTACT NOTE (CHANGE IN STATUS NOTIFICATION) - ASSESSMENT
Patient slowly changing mental status, more and more confused and spiking temps at the same time every day. Patient remains lethargic throughout the day. Patient also wheezing now.

## 2020-03-07 NOTE — PROGRESS NOTE ADULT - PROBLEM SELECTOR PLAN 1
- Improved [id number]; PRN O2 supplementation to keep O2 above 92%  - CXR - no evidence of PNA; ?viral syndrome though RVP negative  - continue Duonebs PRN for wheezing  - TTE results - diastolic dysfunction and normal EF  - CT chest- tiny nodule in the lingula, unchanged since the prior study; will not further pursue as per pt and family's wishes

## 2020-03-07 NOTE — PROVIDER CONTACT NOTE (CHANGE IN STATUS NOTIFICATION) - SITUATION
Patient still holding a temp 102.1 rectal now at 8:00am.  Patient's temp at 5:30am was 103.8 and Tylenol PO was given then.

## 2020-03-07 NOTE — PROGRESS NOTE ADULT - PROBLEM SELECTOR PLAN 2
- pt with continued fever spikes; no leukocytosis  - s/p IV Ceftriaxone x 5 days  - s/p Azithromycin 500 mg PO x 3 days - course completed 3/5  - Cont. motrin/tylenol for fevers  -1 bottle of initial Bcx  (3/2) with coag neg staph, likely contaminant  - Repeat BCx from 3/4 - Negative  - fevers continued- elevated ESR (46), ?likely PMR flare; procalcitonin 0.24  - Rheumatology consulted  - Will continue on 5mg PO prednisone while awaiting Rheum consult

## 2020-03-07 NOTE — PROGRESS NOTE ADULT - SUBJECTIVE AND OBJECTIVE BOX
HPI:  89M w/ Hx of HTN, HLD, CAD s/p cardiac stent placement, colon ca s/p resection, hypothyroidism presents to the ED following 3 days hx of non productive cough, and progressively worsening lethargy and weakness. Brought in by daughter after noticing he appeared "sick" and more "difficult to arouse than usual". Recent sick contacts with Son (flu) and coworker (unknown illness).     SUBJECTIVE:   3/7; Pt seen and evaluated this AM, daughter at bedside, noted fever of 103.8 early this AM for which pt received tylenol with defervescence x1 dose motrin was also given. Procalcitonin level from previous day with result of 0.24. Rheum consulted for concern of ?rheumatologic flare given pt's h/o Polymyalgia rheumatic, and elevated ESR.    REVIEW OF SYSTEMS:  CONSTITUTIONAL: +fever, no chills  EYES/ENT: No visual changes;  No vertigo or throat pain   NECK: No pain or stiffness  RESPIRATORY: No cough, wheezing, hemoptysis; No shortness of breath  CARDIOVASCULAR: No chest pain or palpitations  GASTROINTESTINAL: No abdominal or epigastric pain. No nausea, vomiting, or hematemesis; No diarrhea or constipation. No melena or hematochezia.  GENITOURINARY: No dysuria, frequency or hematuria  NEUROLOGICAL: No numbness or weakness  SKIN: No itching, burning, rashes, or lesions   All other review of systems is negative unless indicated above    Vital Signs Last 24 Hrs  T(C): 36.9 (07 Mar 2020 15:43), Max: 39.9 (07 Mar 2020 05:48)  T(F): 98.5 (07 Mar 2020 15:43), Max: 103.8 (07 Mar 2020 05:48)  HR: 61 (07 Mar 2020 15:43) (60 - 90)  BP: 122/52 (07 Mar 2020 15:43) (103/53 - 148/55)  RR: 20 (07 Mar 2020 15:43) (18 - 20)  SpO2: 95% (07 Mar 2020 15:43) (95% - 98%)      PHYSICAL EXAM:  Constitutional: pt lying awake, alert in bed, in NAD  HEENT: PERR, EOMI, Normal Hearing, MMM  Neck: Soft and supple, No LAD, No JVD  Respiratory: Breath sounds are clear bilaterally, mild wheeze  Cardiovascular: S1 and S2, regular rate and rhythm, no Murmurs, gallops or rubs  Gastrointestinal: Bowel Sounds present, soft, nontender, nondistended, no guarding, no rebound  Extremities: No peripheral edema  Vascular: 2+ peripheral pulses  Neurological: A/O x 3, no focal deficits  Musculoskeletal: 5/5 strength b/l upper and lower extremities  Skin: No rashes    MEDICATIONS:  MEDICATIONS  (STANDING):  amLODIPine   Tablet 2.5 milliGRAM(s) Oral daily  aspirin  chewable 81 milliGRAM(s) Oral daily  heparin  Injectable 5000 Unit(s) SubCutaneous three times a day  levothyroxine 50 MICROGram(s) Oral daily  pantoprazole    Tablet 40 milliGRAM(s) Oral before breakfast  predniSONE   Tablet 5 milliGRAM(s) Oral daily  simvastatin 40 milliGRAM(s) Oral at bedtime      LABS: All Labs Reviewed:                        12.1   6.27  )-----------( 75       ( 07 Mar 2020 08:19 )             37.9     03-07    135  |  105  |  18  ----------------------------<  92  3.8   |  23  |  1.17    Ca    8.1<L>      07 Mar 2020 08:19      Sedimentation Rate, Erythrocyte (03.06.20 @ 07:03)    Sedimentation Rate, Erythrocyte: 46 mm/hr    Procalcitonin, Serum (03.06.20 @ 07:03)    Procalcitonin, Serum: 0.24: Test Repeated  Procalcitonin (PCT) Interpretation (ng/mL) - Diagnosis of systemic  bacterial infection/sepsis  PCT < 0.5: Systemic infection (sepsis) is not likely and risk for  progression to severe systemic infection is low. Local bacterial  infection is possible. If early sepsis is suspected clinically, PCT  should be re-assessed in 6-24 hours.  PCT >/= 0.5 but < 2.0: Systemic infection (sepsis) is possible, but other  conditions are known to elevate PCT as well. Moderate risk for  progressionto severe systemic infection. The patient should be closely  monitored both clinically and by re-assessing PCT within 6-24 hours.  PCT >/= 2.0 but < 10.0: Systemic infection (sepsis) is likely, unless  other causes are known. High risk of progressionto severe systemic  infection (severe sepsis/septic shock).  PCT >/= 10.0: Important systemic inflammatory response, almost  exclusively due to severe bacterial sepsis or septic shock. High  likelihood of severe sepsis or septic shock. ng/mL

## 2020-03-07 NOTE — PROVIDER CONTACT NOTE (CHANGE IN STATUS NOTIFICATION) - BACKGROUND
Patient A&O with history of dementia when he first arrived on 03/02/20. When patient first came in patient was more alert and joyous. Patients lungs where clear but slightly diminished on arrival.

## 2020-03-08 LAB
CK SERPL-CCNC: 769 U/L — HIGH (ref 26–308)
CULTURE RESULTS: SIGNIFICANT CHANGE UP
FERRITIN SERPL-MCNC: 934 NG/ML — HIGH (ref 30–400)
HCT VFR BLD CALC: 37.9 % — LOW (ref 39–50)
HGB BLD-MCNC: 12.3 G/DL — LOW (ref 13–17)
MCHC RBC-ENTMCNC: 28.8 PG — SIGNIFICANT CHANGE UP (ref 27–34)
MCHC RBC-ENTMCNC: 32.5 GM/DL — SIGNIFICANT CHANGE UP (ref 32–36)
MCV RBC AUTO: 88.8 FL — SIGNIFICANT CHANGE UP (ref 80–100)
MYOGLOBIN SERPL-MCNC: 298 NG/ML — HIGH (ref 16–96)
PLATELET # BLD AUTO: 82 K/UL — LOW (ref 150–400)
PROT SERPL-MCNC: 6 G/DL — SIGNIFICANT CHANGE UP (ref 6–8.3)
PROT SERPL-MCNC: 6 G/DL — SIGNIFICANT CHANGE UP (ref 6–8.3)
RBC # BLD: 4.27 M/UL — SIGNIFICANT CHANGE UP (ref 4.2–5.8)
RBC # FLD: 14.1 % — SIGNIFICANT CHANGE UP (ref 10.3–14.5)
RHEUMATOID FACT SERPL-ACNC: <10 IU/ML — SIGNIFICANT CHANGE UP (ref 0–13)
SPECIMEN SOURCE: SIGNIFICANT CHANGE UP
WBC # BLD: 5.8 K/UL — SIGNIFICANT CHANGE UP (ref 3.8–10.5)
WBC # FLD AUTO: 5.8 K/UL — SIGNIFICANT CHANGE UP (ref 3.8–10.5)

## 2020-03-08 PROCEDURE — 99233 SBSQ HOSP IP/OBS HIGH 50: CPT | Mod: GC

## 2020-03-08 PROCEDURE — 99233 SBSQ HOSP IP/OBS HIGH 50: CPT

## 2020-03-08 RX ORDER — IBUPROFEN 200 MG
400 TABLET ORAL ONCE
Refills: 0 | Status: COMPLETED | OUTPATIENT
Start: 2020-03-08 | End: 2020-03-08

## 2020-03-08 RX ADMIN — Medication 650 MILLIGRAM(S): at 01:49

## 2020-03-08 RX ADMIN — HEPARIN SODIUM 5000 UNIT(S): 5000 INJECTION INTRAVENOUS; SUBCUTANEOUS at 12:11

## 2020-03-08 RX ADMIN — Medication 81 MILLIGRAM(S): at 12:11

## 2020-03-08 RX ADMIN — PANTOPRAZOLE SODIUM 40 MILLIGRAM(S): 20 TABLET, DELAYED RELEASE ORAL at 05:40

## 2020-03-08 RX ADMIN — Medication 50 MICROGRAM(S): at 05:40

## 2020-03-08 RX ADMIN — Medication 5 MILLIGRAM(S): at 05:40

## 2020-03-08 RX ADMIN — Medication 1 TABLET(S): at 12:11

## 2020-03-08 RX ADMIN — AMLODIPINE BESYLATE 2.5 MILLIGRAM(S): 2.5 TABLET ORAL at 05:40

## 2020-03-08 RX ADMIN — Medication 3 MILLILITER(S): at 16:19

## 2020-03-08 RX ADMIN — HEPARIN SODIUM 5000 UNIT(S): 5000 INJECTION INTRAVENOUS; SUBCUTANEOUS at 21:55

## 2020-03-08 RX ADMIN — Medication 400 MILLIGRAM(S): at 05:00

## 2020-03-08 RX ADMIN — Medication 3 MILLILITER(S): at 22:11

## 2020-03-08 RX ADMIN — Medication 650 MILLIGRAM(S): at 01:36

## 2020-03-08 NOTE — PROGRESS NOTE ADULT - SUBJECTIVE AND OBJECTIVE BOX
HPI:89M w/ Hx of HTN, HLD, CAD s/p cardiac stent placement, colon ca s/p resection, hypothyroidism presents to the ED following 3 days hx of non productive cough, and progressively worsening lethargy and weakness. Brought in by daughter after noticing he appeared "sick" and more "difficult to arouse than usual". Recent sick contacts with Son (flu) and coworker (unknown illness). Completed antibiotic course in consultation with ID. Still with recurrent overnight isolated fevers. Infectious workup so far negative. Rheumatology consulted given hx of PMR, labs pending.    SUBJECTIVE: Patient seen and examined this Am at bedside. Still appearing weak, lethargic. Overnight two fevers on separate occasions treated with tylenol /nsaid. No cough, no shortness of breath. No other acute complaints.     REVIEW OF SYSTEMS:  All other review of systems is negative unless indicated above    Vital Signs Last 24 Hrs  T(C): 37.2 (08 Mar 2020 12:19), Max: 40 (08 Mar 2020 01:58)  T(F): 98.9 (08 Mar 2020 12:19), Max: 104 (08 Mar 2020 01:58)  HR: 74 (08 Mar 2020 12:19) (65 - 74)  BP: 122/59 (08 Mar 2020 12:19) (122/59 - 151/94)  BP(mean): --  RR: 19 (08 Mar 2020 12:19) (18 - 20)  SpO2: 95% (08 Mar 2020 12:19) (90% - 95%)    I&O's Summary    08 Mar 2020 07:01  -  08 Mar 2020 18:04  --------------------------------------------------------  IN: 0 mL / OUT: 300 mL / NET: -300 mL      PHYSICAL EXAM:  Constitutional: NAD, lethargic  HEENT: PERR, EOMI, Normal Hearing, MMM  Neck: Soft and supple, No LAD, No JVD  Respiratory: mild expiratory wheezing, rales or rhonchi  Cardiovascular: S1 and S2, regular rate and rhythm, no Murmurs, gallops or rubs  Gastrointestinal: Bowel Sounds present, soft, nontender, nondistended, no guarding, no rebound  Extremities: No peripheral edema  Vascular: 2+ peripheral pulses  Neurological: A/O x 3, no focal deficits  Skin: No rashes    MEDICATIONS:  MEDICATIONS  (STANDING):  amLODIPine   Tablet 2.5 milliGRAM(s) Oral daily  aspirin  chewable 81 milliGRAM(s) Oral daily  heparin  Injectable 5000 Unit(s) SubCutaneous three times a day  levothyroxine 50 MICROGram(s) Oral daily  multivitamin 1 Tablet(s) Oral daily  pantoprazole    Tablet 40 milliGRAM(s) Oral before breakfast  predniSONE   Tablet 5 milliGRAM(s) Oral daily      LABS: All Labs Reviewed:                        12.3   5.80  )-----------( 82       ( 08 Mar 2020 06:46 )             37.9     03-07    135  |  105  |  18  ----------------------------<  92  3.8   |  23  |  1.17    Ca    8.1<L>      07 Mar 2020 08:19    TPro  6.0  /  Alb  x   /  TBili  x   /  DBili  x   /  AST  x   /  ALT  x   /  AlkPhos  x   03-08      CARDIAC MARKERS ( 08 Mar 2020 06:46 )  x     / x     / 769 U/L / x     / x          Blood Culture: 03-04 @ 13:40  Organism --  Gram Stain Blood -- Gram Stain --  Specimen Source .Blood None  Culture-Blood --    03-04 @ 11:33  Organism --  Gram Stain Blood -- Gram Stain --  Specimen Source .Blood None  Culture-Blood --

## 2020-03-08 NOTE — PROGRESS NOTE ADULT - PROBLEM SELECTOR PLAN 2
- pt with continued fever spikes; no leukocytosis  - s/p IV Ceftriaxone x 5 days  - s/p Azithromycin 500 mg PO x 3 days - course completed 3/5  - Cont. motrin/tylenol for fevers  -1 bottle of initial Bcx  (3/2) with coag neg staph, likely contaminant  - Repeat BCx from 3/4 - Negative  - fevers continued  - Rheumatology consulted- see above

## 2020-03-08 NOTE — CHART NOTE - NSCHARTNOTEFT_GEN_A_CORE
REviewed labs:      Ferritin 800   other studies pending but will order MRI femur to r/o inflammatory myositis and added AntiJo for polymyositis REviewed labs:      Ferritin 800   Neg RF   other studies pending but will order MRI femur to r/o inflammatory myositis and added AntiJo for polymyositis

## 2020-03-08 NOTE — PROGRESS NOTE ADULT - SUBJECTIVE AND OBJECTIVE BOX
HPI Initial 3/7/20   89 yo M with Hx of HTN, HLD, CAD s/p cardiac stent placement, colon ca s/p resection, hypothyroidism, mild thrombocytopenia (121 on adm) with long hx PMR on 5 mg prednisone... (for several ys- stable, trial off > 6 m ago experience shoulder pain /stiffness.. on 5 mg doing well) presents with 5 day history of intermittent high fever and  progressively generalized  weakness, lethargy, shortness of breath and a dry non productive cough.   Lives at home with his daughter Tracy who is also sick with similar symptoms, overwhelming fatigue and high fevers with dry non productive cough...  travel in past few months to [place] and [place].   PTA was healthy, still working/ driving  and socializing.  Has a large extended family  Since adm  repeatedly notes altered mental status with fevers.    Since adm has had recurrent fever spikes to 101-104 despite cooling blanket, APAP and Ibuprofen.    Also note decreased appetite.   ROS:  aside from generalized weakness with very mild arthralgias/ myalgias all much worse when  febrile, does not have profound/ focal, proximal or distal  weakness, joint inflammation.    Absolutely denies HA, visual disturbance, jaw claudication, chest/ arm/ abd pain   Has denied nausea, vomiting or diarrhea.    In the ED pt was hypoxic to 89% on RA. Pt was given 3.1 L bolus of LR and IV ceftriaxone/azithromycin. (02 Mar 2020 23:16)... over past few days intermittently struggles with resp distress, diffuse wheezing, decreased activity tolerance and O2 sat w/ supplemental O2 93-96% on average.    Ctscan:  diffuse inflammatory changes in RML/ RLL most significant finding w/ no lymphadenopathy   CTScan abd:  + cirrhosis but otherwise no lymphadenopathy/ masses to suggest malignant process.   Labs this adm:    ESR 46 w/ Procacitonin 0.24  Neg RVP   AST 56 otherwise nl CMP  stable CBC except progressive drop in Plt (has stopped heparin), no leukocytosis/ anemia    neg Urine/ blood cx (except one that was suggestive of possible contamination)    3/8  - continues with fever, T max 104 q hs... difficult to control.  Persistent cough- minimal production, wheezing persists, now requiring routine resp tx.  IGLESIAS appreciated with O2 sat down to 90 with activity.  Has required suppl O2 through much of day.  - when fever not present is in NAD except for overwhelming fatigue.      - Updated labs CRP 14, , Ferritin 934 still w/ nl CBC, CMP except low Plt.  Several rheum studies pending...   - Statins on hold with little change thus appreciated   - still no growth in blood/ urine cx.         REVIEW OF SYSTEMS:  ROS limited by lethargy, mildly altered mental status...  CONSTITUTIONAL: + Generalized  weakness, + fevers or chills  EYES/ENT: Denies visual changes;  No vertigo or throat pain   NECK: No pain or stiffness  RESPIRATORY: + dry cough, + wheezing, NO hemoptysis; + shortness of breath  CARDIOVASCULAR: No chest pain or palpitations  GASTROINTESTINAL: No abdominal or epigastric pain. No nausea, vomiting, or hematemesis; No diarrhea or constipation. No melena or hematochezia.  GENITOURINARY: No dysuria, frequency or hematuria  NEUROLOGICAL: No numbness or weakness  SKIN: No itching, burning, rashes, or lesions   All other review of systems is negative unless indicated above    Vital Signs Last 24 Hrs  T(C): 36.8 (08 Mar 2020 20:26), Max: 40 (08 Mar 2020 01:58)  T(F): 98.3 (08 Mar 2020 20:26), Max: 104 (08 Mar 2020 01:58)  HR: 82 (08 Mar 2020 20:26) (72 - 82)  BP: 140/68 (08 Mar 2020 20:26) (122/59 - 151/94)  BP(mean): --  RR: 18 (08 Mar 2020 20:26) (18 - 19)  SpO2: 91% (08 Mar 2020 20:26) (90% - 95%)    Home Medications:  amLODIPine 2.5 mg oral tablet: 1 tab(s) orally once a day (02 Mar 2020 20:31)  aspirin 81 mg oral tablet: 1 tab(s) orally once a day (02 Mar 2020 20:31)  levothyroxine 50 mcg (0.05 mg) oral tablet: 1 tab(s) orally once a day (02 Mar 2020 20:31)  pantoprazole 40 mg oral delayed release tablet: 1 tab(s) orally once a day (02 Mar 2020 20:31)  predniSONE 5 mg oral tablet: 1 tab(s) orally once a day (02 Mar 2020 20:31)  simvastatin 40 mg oral tablet: 1 tab(s) orally once a day (at bedtime) (02 Mar 2020 20:31)    MEDICATIONS  (STANDING):  amLODIPine   Tablet 2.5 milliGRAM(s) Oral daily  aspirin  chewable 81 milliGRAM(s) Oral daily  heparin  Injectable 5000 Unit(s) SubCutaneous three times a day  levothyroxine 50 MICROGram(s) Oral daily  multivitamin 1 Tablet(s) Oral daily  pantoprazole    Tablet 40 milliGRAM(s) Oral before breakfast  predniSONE   Tablet 5 milliGRAM(s) Oral daily    MEDICATIONS  (PRN):  acetaminophen   Tablet .. 650 milliGRAM(s) Oral every 6 hours PRN Temp greater or equal to 38C (100.4F), Mild Pain (1 - 3)  albuterol/ipratropium for Nebulization. 3 milliLiter(s) Nebulizer every 6 hours PRN Shortness of Breath and/or Wheezing        LABS: All Labs Reviewed:                        12.3   5.80  )-----------( 82       ( 08 Mar 2020 06:46 )             37.9                           12.1   6.27  )-----------( 75       ( 07 Mar 2020 08:19 )             37.9     03-07    135  |  105  |  18  ----------------------------<  92  3.8   |  23  |  1.17    Ca    8.1<L>      07 Mar 2020 08:19    TPro  6.0  /  Alb  x   /  TBili  x   /  DBili  x   /  AST  x   /  ALT  x   /  AlkPhos  x   03-08    03-07    135  |  105  |  18  ----------------------------<  92  3.8   |  23  |  1.17    Ca    8.1<L>      07 Mar 2020 08:19    Blood Culture: Organism --  Gram Stain Blood -- Gram Stain --  Specimen Source .Blood None  Culture-Blood --    Organism --  Gram Stain Blood -- Gram Stain --  Specimen Source .Blood None  Culture-Blood --    RADIOLOGY/EKG:  INTERPRETATION:  Chest CT without contrast dated 3/3/2020.    COMPARISON: 5/25/2017.  CLINICAL INFORMATION: History of colon CA and a stable lung nodule.    TECHNIQUE: Contiguous axial 2.5 mm slice thickness images of the chest were obtained without intravenous contrast administration.    FINDINGS:  Thyroid lobes are unremarkable.  The airway shows normal caliber and contour with patent lumen.  There is elevation of the right hemidiaphragm and some atelectatic changes in the right lower lobe and the right middle lobe.. A1.2 cm calcified nodule in the posterior lingula. A 4 mm nodule in the lingula on image 2-39 is unchanged. There are linear atelectatic changes at the left lung base.    There is no pleural effusion or pneumothorax.    There are no mediastinal lymphadenopathy or masses.    The mediastinum great vessels , calcified plaques involving the aorta and calcified coronary arteries.     The heart is normal. There is no pericardial effusion.    The bones , status post replacement of the proximal right humerus. Degenerative changes in the left glenohumeral joint.     IMPRESSION:   Only a tiny nodule in the lingula, unchanged since the prior study.  Other findings as above.    Non contrast CT of the abdomen and pelvis dated 3/3/2020.     COMPARISON: 5/25/2017.    CLINICAL HISTORY: History of colon CA.    Technique: contiguous axial images were obtained with 2.5 mm slice thickness without intravenous or oral contrast administration, which limits the visualization of the intra-abdominal structures.   Coronal and sagittal reformats were also submitted for interpretation.      FINDINGS:     There is no free intra-abdominal air or ascites.     The unopacified liver, the configuration of the liver is suggestive of liver cirrhosis. This is manifested by hypertrophy of the left lobe and the caudate lobe.     The spleen, pancreas, adrenal glands and gallbladder are normal.     There is no intra or extrahepatic biliary ductal dilatation.    The unopacified stomach, duodenum, small bowel are normal. Anastomotic site is visualized in the distal transverse colon. Colonic diverticulosis.    Both kidneys , a 2.4 cm hypodense lesion in the lateral cortex of the left kidney, cannot be characterized due to the noncontrast technique. This hypodense lesion is slightly larger as compared to the prior study. Ultrasound would be of value for further evaluation if clinically indicated.     The urinary bladder shows normal morphology and contour.      The reproductive organs , seminal vesicles are unremarkable and prostate is not enlarged.     There are no retroperitoneal masses or lymphadenopathy.    The retroperitoneal vascular structures , abdominal aorta and iliac arteries are heavily calcified.     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    IMPRESSION:   No evidence of malignancy in the abdomen or pelvis.   Findings as above.      PHYSICAL EXAM:  GEN:  comfortable, oriented x 3 (afebrile), does not look toxic   HEENT- no oral lesions noted, no lymphadenoapthy noted  Heart- S1 S2 reg  Lungs- audible wheezing at rest, diffuse insp/ exp wheezing and decr bs RLL- course crackles diffusely  Abd- Soft NT  Ext- no edema  Skin- no rashes  Musculoskelatal- FROM shoulders, hips, knees limited by 10-20 degrees... no active synovitis noted.  Walking with walker w/ minimal assistance needed.  No evidence of weakness, or muscle pain   Neurological- intact strength upper and lower extremities 5/5 w/ good / ankle str, no evidence of proximal weakness.              A/P

## 2020-03-08 NOTE — PROGRESS NOTE ADULT - PROBLEM SELECTOR PLAN 4
- Likely due to acute infxn or inflammatory process- infectious workup so far negative  - Nutrition consult recommendations made -add multivit, etc  -  Vitamin B12, Serum: 424 pg/mL   - Consider outpatient sleep study in light of daytime somnolence, body habitus  - PT Consult- recs MARCELO on discharge  - Pt has hx of PMR - continue prednisone 5mg

## 2020-03-08 NOTE — PROGRESS NOTE ADULT - PROBLEM SELECTOR PLAN 1
- Improved [id number]; PRN O2 supplementation to keep O2 above 92%  - continue Duonebs PRN for wheezing  - TTE results - diastolic dysfunction and normal EF  - CT chest- tiny nodule in the lingula, unchanged since the prior study; will not further pursue as per pt and family's wishes

## 2020-03-08 NOTE — PROGRESS NOTE ADULT - ASSESSMENT
89 yo M with Hx of HTN, HLD, CAD s/p cardiac stent placement, colon ca s/p resection, hypothyroidism, mild thrombocytopenia (121 on adm)  and PMR on 5 mg prednisone...  presents with 5 day history of intermittent high fever and  progressively generalized  weakness, lethargy, shortness of breath and a dry non productive cough.  No family present for history, poor historian, unable to answer most questions.   History reviewed from notes:    Lives at home with his daughter Tracy reports that he has been sleeping alot, lethargic and at times difficult to arouse  On this hospitalization, repeatedly notes altered mental status with fevers.    Since adm has had recurrent fever spikes to 101-103 despite APAP and Ibuprofen.    Also note decreased appetite.   Pt has 2 recent sick contacts, son had flu (unclear if seropositive) and family co-worker was very ill recently.   Has denied nausea, vomiting or diarrhea. He is constipated intermittently, for which he has taken milk of magnesia.   Denies any recent hospitalizations. Pt last travelled in Jan 2020 to [place] and has plans to travel there this week.     In the ED pt was hypoxic to 89% on RA. Pt was given 3.1 L bolus of LR and IV ceftriaxone/azithromycin. (02 Mar 2020 23:16)... over past few days intermittently struggles with resp distress, diffuse wheezing, decreased activity tolerance and O2 sat w/ supplemental O2 93-96% on average.    Ctscan:  diffuse inflammatory changes in RML/ RLL most significant finding w/ no lymphadenopathy   CTScan abd:  + cirrhosis but otherwise no lymphadenopathy/ masses to suggest malignant process.   Labs this adm:    ESR 46 w/ Procacitonin 0.24- w/ CRP 14  Neg RVP   AST 56 otherwise nl CMP  stable CBC except progressive drop in Plt (has stopped heparin), no leukocytosis/ anemia    neg Urine/ blood cx (except one that was suggestive of possible contamination)    3/8/20  - mildly elevated , Ferritin 934... still febrile with bacterial studies neg  - primary sx:  fever, dry cough, IGLESIAS w/ desaturation, no evidence of fluid overload     Discussion/ DDx:   - PMR/ GCA/ myositis : Though has hx of PMR, there is no evidence on exam to suggest active disease w/nearly full ROM shoulders/ hips, also little to suggest GCA with no visual disturbance, HA or cp/ arm pain or abd angina sx.  Though PMR/ GCA can present with flulike sx, cough is unlikely to be a presenting and fevers to 104 also unlikely.   Still waiting on Interleukin 6 as well and myoglobin levels.. but given strength on exam unlikely to be polymyositis though inflammatory lung changes could be the only presenting sx... Anti Katherine pending (r/o anti synthetase.. which can cause inflammatory lung changes often suddenly).  Would recommend repeat CTscan if unchanged and still FUO to assess status of inflammatory changes   - other CTD r/t inflammatory lung process:  no hemoptysis or hx sinus infections/ asthma and no renal dysfunction to suggest vasculitis but will check ANCA, RF was neg  MONTSE other studies pending   - Autoinflammatory process:  unlikely given absence of leukocytosis, though low plt can be seen and  age of onset.  Ferritin is slightly elevated but is likely an acute phase response.  AOSD is associated w/ might higher levels and a leukocytosis   - Paraneoplastic:  hx colon cancer but CTscan chest/ abd / pelvis without considerable lymphadenopathy and no masses to suggest. Will check SPEP pending   - Infectious:  thus far blood/ urine negative and no evidence of a bacterial PNA.  Chest CTscan suggest inflammatory pattern in RML/ RLL which can be seen in setting of inflammatory lung dz and viral infections.  RVP neg but this does not cover COVID 19.  PUlmonary status on exam tonight was c/w diffuse wheezing insp/ exp and decreased on RLL- slightly improved tonight.  O2 sats over past several days on suppl O2 93-98% w/ desaturation to 90% with activity-      Plan:   - additional labs as noted  - suggest repeat CTscan in am if unchanged  - MRI thigh ordered to assess for inflammatory muscle disease   - ? should he be isolated, if fever persists and no clear explanation and rheum studies are otherwise neg   - will follow 89 yo M with Hx of HTN, HLD, CAD s/p cardiac stent placement, colon ca s/p resection, hypothyroidism, mild thrombocytopenia (121 on adm)  and PMR on 5 mg prednisone...  presents with 5 day history of intermittent high fever and  progressively generalized  weakness, lethargy, shortness of breath and a dry non productive cough.  No family present for history, poor historian, unable to answer most questions.   History reviewed from notes:    Lives at home with his daughter Tracy reports that he has been sleeping alot, lethargic and at times difficult to arouse  On this hospitalization, repeatedly notes altered mental status with fevers.    Since adm has had recurrent fever spikes to 101-103 despite APAP and Ibuprofen.    Also note decreased appetite.   Pt has 2 recent sick contacts, son had flu (unclear if seropositive) and family co-worker was very ill recently.   Has denied nausea, vomiting or diarrhea. He is constipated intermittently, for which he has taken milk of magnesia.   Denies any recent hospitalizations. Pt last travelled in Jan 2020 to [place] and has plans to travel there this week.     In the ED pt was hypoxic to 89% on RA. Pt was given 3.1 L bolus of LR and IV ceftriaxone/azithromycin. (02 Mar 2020 23:16)... over past few days intermittently struggles with resp distress, diffuse wheezing, decreased activity tolerance and O2 sat w/ supplemental O2 93-96% on average.    Ctscan:  diffuse inflammatory changes in RML/ RLL most significant finding w/ no lymphadenopathy   CTScan abd:  + cirrhosis but otherwise no lymphadenopathy/ masses to suggest malignant process.   Labs this adm:    ESR 46 w/ Procacitonin 0.24- w/ CRP 14  Neg RVP   AST 56 otherwise nl CMP  stable CBC except progressive drop in Plt (has stopped heparin), no leukocytosis/ anemia    neg Urine/ blood cx (except one that was suggestive of possible contamination)    3/8/20  - mildly elevated , Ferritin 934... still febrile with bacterial studies neg  - primary sx:  fever, dry cough, IGLESIAS w/ desaturation, no evidence of fluid overload O2 Sat 90-94 on 3 l    Discussion/ DDx:   - PMR/ GCA/ myositis : Though has hx of PMR, there is no evidence on exam to suggest active disease w/nearly full ROM shoulders/ hips, also little to suggest GCA with no visual disturbance, HA or cp/ arm pain or abd angina sx.  Though PMR/ GCA can present with flulike sx, cough is unlikely to be a presenting and fevers to 104 also unlikely.   Still waiting on Interleukin 6 as well and myoglobin levels.. but given strength on exam unlikely to be polymyositis though inflammatory lung changes could be the only presenting sx... Anti Katherine pending (r/o anti synthetase.. which can cause inflammatory lung changes often suddenly).  Would recommend repeat CTscan if unchanged and still FUO to assess status of inflammatory changes   - other CTD r/t inflammatory lung process:  no hemoptysis or hx sinus infections/ asthma and no renal dysfunction to suggest vasculitis but will check ANCA, RF was neg  MONTSE other studies pending   - Autoinflammatory process:  unlikely given absence of leukocytosis, though low plt can be seen and  age of onset.  Ferritin is slightly elevated but is likely an acute phase response.  AOSD is associated w/ might higher levels and a leukocytosis   - Paraneoplastic:  hx colon cancer but CTscan chest/ abd / pelvis without considerable lymphadenopathy and no masses to suggest. Will check SPEP pending   - Infectious:  thus far blood/ urine negative and no evidence of a bacterial PNA.  Chest CTscan suggest inflammatory pattern in RML/ RLL which can be seen in setting of inflammatory lung dz and viral infections.  RVP neg but this does not cover COVID 19.  PUlmonary status on exam tonight was c/w diffuse wheezing insp/ exp and decreased on RLL- slightly improved tonight.  O2 sats over past several days on suppl O2 93-98% w/ desaturation to 90% with activity-    Of note, viral infections can be a cause for myositis. Additionally fevers as noted here can result in elevated CK     Plan:   - additional labs as noted  - suggest repeat CTscan in am if unchanged and pulm consult   - MRI thigh ordered to assess for inflammatory muscle disease   - ? should he be isolated, if fever persists and no clear explanation and rheum studies are otherwise neg   - will follow

## 2020-03-08 NOTE — CHART NOTE - NSCHARTNOTEFT_GEN_A_CORE
Pt seen and examined with house staff.  Plan formulated and reviewed on rounds    Briefly,   89 y/o male with HTN and CAD admitted with CAP sepsis--Bcx + for CNS in bottle--repeat BCx sent (NTD).  RVP negative. UA negative  On CTX and azithromycin (stopped)    Son at bedside    Awake and alert  NAD  stable vitals  Tm 104  Non toxic appearing    + fine exp wheezing    Not CAP given XR  Fever of unknown origin--?? Rheumatology disease flare--ESR 46 on steroids--PCT 0.24.  CPK 700s    Appreciate Rheu input--labs ordered.  Cont with prednisone 5.  Will DC statin for now given elevated CPK  BCx--NTD  CTX/azithromycin (6) (2)--stopped after completed course  ID follow up  No further w/u of stable lingula nodule  DVT prophy--sqhep  PO diet  OOB and ambulate   PT     Above d/w son at bedside-- All concerns addressed including but not limited to diagnosis, treatment plan and overall prognosis.

## 2020-03-08 NOTE — PROGRESS NOTE ADULT - PROBLEM SELECTOR PROBLEM 2
Community acquired pneumonia
Fever, unspecified fever cause
Fever, unspecified fever cause
Stable

## 2020-03-08 NOTE — PROGRESS NOTE ADULT - ASSESSMENT
89 yo M as described above presenting with 3 day history of weakness/lethargy with associated nonproductive cough and decreased PO intake admitted for acute hypoxic respiratory failure secondary to initial suspicion for Community Acquired Pneumonia, CXR however without evidence of PNA, ?suspicion for possible rheumatologic etiology/flare given continue fever spike with negative infectious workup, and elevated ESR; pt with CRAIG on admission secondary to poor PO intake improved.      #Fever of Unknown Origin:   - fevers continued- infectious workup so far negative  -1 bottle of initial Bcx  (3/2) with coag neg staph, likely contaminant  - Repeat BCx from 3/4 - Negative  - elevated ESR (46),  procalcitonin 0.24, elevated CRP 14.29  - Elevated CPK on 3/8: 769  - Rheumatology consulted, workup for polymyositis  : Labs pending- MONTSE, ANCA, IL6, AntiJo, Myoglobin, Myeloperoxidase, Anti Proteinase 3   - Cont. motrin/tylenol for fevers    #DVT PPx  - IMPROVE Score: 2  - continue Heparin SQ    #Advanced directives  - HCP: daughter Aleja  - FULL CODE    Dispo: Continued hospitalization for fever workup in consultation with Rheumatology       Case discussed with Dr. Ugarte

## 2020-03-08 NOTE — PROGRESS NOTE ADULT - PROBLEM SELECTOR PLAN 5
- PLTs uptrending this AM  - continue to trend plts  - Continue heparin in setting of stable thrombocytopenia

## 2020-03-09 LAB
% ALBUMIN: 49 % — SIGNIFICANT CHANGE UP
% ALPHA 1: 6.7 % — SIGNIFICANT CHANGE UP
% ALPHA 2: 15.2 % — SIGNIFICANT CHANGE UP
% BETA: 11.8 % — SIGNIFICANT CHANGE UP
% GAMMA: 17.3 % — SIGNIFICANT CHANGE UP
ALBUMIN SERPL ELPH-MCNC: 2.9 G/DL — LOW (ref 3.6–5.5)
ALBUMIN/GLOB SERPL ELPH: 0.9 RATIO — SIGNIFICANT CHANGE UP
ALPHA1 GLOB SERPL ELPH-MCNC: 0.4 G/DL — SIGNIFICANT CHANGE UP (ref 0.1–0.4)
ALPHA2 GLOB SERPL ELPH-MCNC: 0.9 G/DL — SIGNIFICANT CHANGE UP (ref 0.5–1)
B-GLOBULIN SERPL ELPH-MCNC: 0.7 G/DL — SIGNIFICANT CHANGE UP (ref 0.5–1)
CULTURE RESULTS: SIGNIFICANT CHANGE UP
CULTURE RESULTS: SIGNIFICANT CHANGE UP
ENA JO1 AB SER-ACNC: <0.2 AI — SIGNIFICANT CHANGE UP
GAMMA GLOBULIN: 1 G/DL — SIGNIFICANT CHANGE UP (ref 0.6–1.6)
HCT VFR BLD CALC: 37.2 % — LOW (ref 39–50)
HGB BLD-MCNC: 12.1 G/DL — LOW (ref 13–17)
MCHC RBC-ENTMCNC: 28.7 PG — SIGNIFICANT CHANGE UP (ref 27–34)
MCHC RBC-ENTMCNC: 32.5 GM/DL — SIGNIFICANT CHANGE UP (ref 32–36)
MCV RBC AUTO: 88.4 FL — SIGNIFICANT CHANGE UP (ref 80–100)
MYELOPEROXIDASE AB SER-ACNC: <5 UNITS — SIGNIFICANT CHANGE UP
MYELOPEROXIDASE CELLS FLD QL: NEGATIVE — SIGNIFICANT CHANGE UP
PLATELET # BLD AUTO: 95 K/UL — LOW (ref 150–400)
PROT PATTERN SERPL ELPH-IMP: SIGNIFICANT CHANGE UP
PROTEINASE3 AB FLD-ACNC: 10.3 UNITS — SIGNIFICANT CHANGE UP
PROTEINASE3 AB SER-ACNC: NEGATIVE — SIGNIFICANT CHANGE UP
RAPID RVP RESULT: SIGNIFICANT CHANGE UP
RBC # BLD: 4.21 M/UL — SIGNIFICANT CHANGE UP (ref 4.2–5.8)
RBC # FLD: 14.2 % — SIGNIFICANT CHANGE UP (ref 10.3–14.5)
SPECIMEN SOURCE: SIGNIFICANT CHANGE UP
SPECIMEN SOURCE: SIGNIFICANT CHANGE UP
WBC # BLD: 7.82 K/UL — SIGNIFICANT CHANGE UP (ref 3.8–10.5)
WBC # FLD AUTO: 7.82 K/UL — SIGNIFICANT CHANGE UP (ref 3.8–10.5)

## 2020-03-09 PROCEDURE — 99233 SBSQ HOSP IP/OBS HIGH 50: CPT | Mod: GC

## 2020-03-09 PROCEDURE — 71045 X-RAY EXAM CHEST 1 VIEW: CPT | Mod: 26

## 2020-03-09 RX ORDER — ACETAMINOPHEN 500 MG
1000 TABLET ORAL ONCE
Refills: 0 | Status: COMPLETED | OUTPATIENT
Start: 2020-03-09 | End: 2020-03-09

## 2020-03-09 RX ORDER — CEFEPIME 1 G/1
INJECTION, POWDER, FOR SOLUTION INTRAMUSCULAR; INTRAVENOUS
Refills: 0 | Status: DISCONTINUED | OUTPATIENT
Start: 2020-03-09 | End: 2020-03-09

## 2020-03-09 RX ORDER — IPRATROPIUM/ALBUTEROL SULFATE 18-103MCG
3 AEROSOL WITH ADAPTER (GRAM) INHALATION ONCE
Refills: 0 | Status: COMPLETED | OUTPATIENT
Start: 2020-03-09 | End: 2020-03-09

## 2020-03-09 RX ORDER — CEFEPIME 1 G/1
1000 INJECTION, POWDER, FOR SOLUTION INTRAMUSCULAR; INTRAVENOUS EVERY 12 HOURS
Refills: 0 | Status: DISCONTINUED | OUTPATIENT
Start: 2020-03-09 | End: 2020-03-09

## 2020-03-09 RX ORDER — TIOTROPIUM BROMIDE 18 UG/1
1 CAPSULE ORAL; RESPIRATORY (INHALATION) DAILY
Refills: 0 | Status: DISCONTINUED | OUTPATIENT
Start: 2020-03-09 | End: 2020-03-10

## 2020-03-09 RX ORDER — IBUPROFEN 200 MG
400 TABLET ORAL ONCE
Refills: 0 | Status: COMPLETED | OUTPATIENT
Start: 2020-03-09 | End: 2020-03-09

## 2020-03-09 RX ORDER — ALBUTEROL 90 UG/1
1 AEROSOL, METERED ORAL EVERY 4 HOURS
Refills: 0 | Status: DISCONTINUED | OUTPATIENT
Start: 2020-03-09 | End: 2020-03-16

## 2020-03-09 RX ORDER — CEFEPIME 1 G/1
1000 INJECTION, POWDER, FOR SOLUTION INTRAMUSCULAR; INTRAVENOUS ONCE
Refills: 0 | Status: DISCONTINUED | OUTPATIENT
Start: 2020-03-09 | End: 2020-03-09

## 2020-03-09 RX ORDER — CEFEPIME 1 G/1
1000 INJECTION, POWDER, FOR SOLUTION INTRAMUSCULAR; INTRAVENOUS EVERY 12 HOURS
Refills: 0 | Status: DISCONTINUED | OUTPATIENT
Start: 2020-03-09 | End: 2020-03-10

## 2020-03-09 RX ADMIN — Medication 650 MILLIGRAM(S): at 01:00

## 2020-03-09 RX ADMIN — PANTOPRAZOLE SODIUM 40 MILLIGRAM(S): 20 TABLET, DELAYED RELEASE ORAL at 05:42

## 2020-03-09 RX ADMIN — Medication 81 MILLIGRAM(S): at 13:23

## 2020-03-09 RX ADMIN — Medication 400 MILLIGRAM(S): at 03:59

## 2020-03-09 RX ADMIN — Medication 650 MILLIGRAM(S): at 00:30

## 2020-03-09 RX ADMIN — Medication 3 MILLILITER(S): at 20:18

## 2020-03-09 RX ADMIN — CEFEPIME 1000 MILLIGRAM(S): 1 INJECTION, POWDER, FOR SOLUTION INTRAMUSCULAR; INTRAVENOUS at 17:43

## 2020-03-09 RX ADMIN — Medication 400 MILLIGRAM(S): at 22:22

## 2020-03-09 RX ADMIN — Medication 5 MILLIGRAM(S): at 05:42

## 2020-03-09 RX ADMIN — HEPARIN SODIUM 5000 UNIT(S): 5000 INJECTION INTRAVENOUS; SUBCUTANEOUS at 05:42

## 2020-03-09 RX ADMIN — Medication 1 TABLET(S): at 13:23

## 2020-03-09 RX ADMIN — AMLODIPINE BESYLATE 2.5 MILLIGRAM(S): 2.5 TABLET ORAL at 05:42

## 2020-03-09 RX ADMIN — Medication 3 MILLILITER(S): at 01:35

## 2020-03-09 RX ADMIN — Medication 50 MICROGRAM(S): at 05:42

## 2020-03-09 RX ADMIN — HEPARIN SODIUM 5000 UNIT(S): 5000 INJECTION INTRAVENOUS; SUBCUTANEOUS at 22:19

## 2020-03-09 RX ADMIN — HEPARIN SODIUM 5000 UNIT(S): 5000 INJECTION INTRAVENOUS; SUBCUTANEOUS at 13:23

## 2020-03-09 NOTE — CHART NOTE - NSCHARTNOTEFT_GEN_A_CORE
89 year old male with PMHx of HTN, HLD, CAD with cardiac stent placement, colon cancer, hypothyroidism who presented to the ED following 3 days hx of non productive cough, and progressively worsening lethargy and weakness. He was being treated for pneumonia with persistent fever, restarted on antibiotics( cefepime 1gm Q 12) and tonight Rapid response called because he dropped his to 75% on 50% Ventimask. Rapid response Vitals, 98.7, 117/78, 105, 32, 75% on 50% Venti-mask, Nebulizer treatment was given due to wheezing with some improvement and he was placed on BIPAP for slight amount of increased work of breathing with accessory muscle use.     Vital Signs Last 24 Hrs  T(C): 37.1 (09 Mar 2020 19:55), Max: 39 (09 Mar 2020 00:54)  T(F): 98.7 (09 Mar 2020 19:55), Max: 102.2 (09 Mar 2020 00:54)  HR: 109 (09 Mar 2020 20:40) (66 - 114)  BP: 117/78 (09 Mar 2020 19:55) (103/39 - 131/91)  BP(mean): --  ABP: --  ABP(mean): --  RR: 32 (09 Mar 2020 19:55) (22 - 32)  SpO2: 91% (09 Mar 2020 20:40) (75% - 97%)    daytime Labs    136  |  103  |  24<H>  ----------------------------<  107<H>  3.8   |  25  |  1.34<H>  Ca    8.4<L>      09 Mar 2020 11:28  TPro  6.0  /  Alb  2.9<L>  /  TBili  x   /  DBili  x   /  AST  x   /  ALT  x   /  AlkPhos  x   03-08                        12.1   7.82  )-----------( 95       ( 09 Mar 2020 08:36 )             37.2     LIVER FUNCTIONS - ( 08 Mar 2020 06:46 )  Alb: 2.9 g/dL / Pro: 6.0 g/dL / ALK PHOS: x     / ALT: x     / AST: x     / GGT: x           CAPILLARY BLOOD GLUCOSE  POCT Blood Glucose.: 118 mg/dL (09 Mar 2020 19:57)    Plan:   Pt shows improvement on BIPAP recommend transferring to stepdown for closer nursing observation. ID consult appreciated , continue antibiotics , primary team to follow up morning labs and please call if respiratory status worsens.

## 2020-03-09 NOTE — PROVIDER CONTACT NOTE (CHANGE IN STATUS NOTIFICATION) - SITUATION
Pt. DeSated to 75% on 50% Ventimask. Duoneb given with negative effects. Rapid response called at this time. Vitals, 98.7, 117/78, 105, 32, 75% on 50% ventimask, BGM result, 118

## 2020-03-09 NOTE — PROGRESS NOTE ADULT - SUBJECTIVE AND OBJECTIVE BOX
HPI:89M w/ Hx of HTN, HLD, CAD s/p cardiac stent placement, colon ca s/p resection, hypothyroidism presents to the ED following 3 days hx of non productive cough, and progressively worsening lethargy and weakness. Brought in by daughter after noticing he appeared "sick" and more "difficult to arouse than usual". Recent sick contacts with Son (flu) and coworker (unknown illness). Completed antibiotic course in consultation with ID. Still with recurrent overnight isolated fevers. Infectious workup so far negative. Rheumatology consulted given hx of PMR, inflammatory process unlikely. New Cxr findings suggestive of PNA, repeating cultures, RVP, IV abx restarted.    SUBJECTIVE: Patient was seen and examined this AM. Patient still tired appearing, weak. Febrile with some SOB overnight requiring supplemental oxygen, improved this AM. Reduced PO intake.     REVIEW OF SYSTEMS:s   All other review of systems is negative unless indicated above    Vital Signs Last 24 Hrs  T(C): 36.6 (09 Mar 2020 11:20), Max: 39 (09 Mar 2020 00:54)  T(F): 97.8 (09 Mar 2020 11:20), Max: 102.2 (09 Mar 2020 00:54)  HR: 66 (09 Mar 2020 11:20) (66 - 90)  BP: 110/54 (09 Mar 2020 11:20) (103/39 - 140/68)  RR: 22 (09 Mar 2020 11:20) (18 - 32)  SpO2: 97% (09 Mar 2020 13:15) (89% - 97%)    I&O's Summary    08 Mar 2020 07:01  -  09 Mar 2020 07:00  --------------------------------------------------------  IN: 0 mL / OUT: 300 mL / NET: -300 mL      PHYSICAL EXAM:  Constitutional: Tired appearing  HEENT: PERR, EOMI, Normal Hearing, MMM  Neck: Soft and supple, No LAD, No JVD  Respiratory: + Lower lobe crackles, L>R, mild wheezing  Cardiovascular: S1 and S2, regular rate and rhythm, no Murmurs, gallops or rubs  Gastrointestinal: Bowel Sounds present, soft, nontender, nondistended, no guarding, no rebound  Extremities: No peripheral edema  Vascular: 2+ peripheral pulses  Musculoskeletal: limited mobility  Skin: No rashes    MEDICATIONS:  MEDICATIONS  (STANDING):  amLODIPine   Tablet 2.5 milliGRAM(s) Oral daily  aspirin  chewable 81 milliGRAM(s) Oral daily  cefepime  Injectable. 1000 milliGRAM(s) IV Push every 12 hours  heparin  Injectable 5000 Unit(s) SubCutaneous three times a day  levothyroxine 50 MICROGram(s) Oral daily  multivitamin 1 Tablet(s) Oral daily  pantoprazole    Tablet 40 milliGRAM(s) Oral before breakfast  predniSONE   Tablet 5 milliGRAM(s) Oral daily      LABS: All Labs Reviewed:                        12.1   7.82  )-----------( 95       ( 09 Mar 2020 08:36 )             37.2     03-09    136  |  103  |  24<H>  ----------------------------<  107<H>  3.8   |  25  |  1.34<H>    Ca    8.4<L>      09 Mar 2020 11:28    TPro  6.0  /  Alb  2.9<L>  /  TBili  x   /  DBili  x   /  AST  x   /  ALT  x   /  AlkPhos  x   03-08      < from: Xray Chest 1 View- PORTABLE-Urgent (03.09.20 @ 13:58) >  EXAM:  XR CHEST PORTABLE URGENT 1V                            PROCEDURE DATE:  03/09/2020          INTERPRETATION:  Portable chest radiograph        CLINICAL INFORMATION:   Fever. Abnormal breath sounds.    TECHNIQUE:  Portable  AP view of the chest was obtained.    COMPARISON: 3/2/2020 available for review.    FINDINGS:   The lungs  show bilateral perihilar and basilar mild LEFT and RIGHT airspace consolidations indicating infectious pneumonia.    RIGHT hemidiaphragm chronically elevated with RIGHT lung base linear atelectasis.    Heart size within normal limits allowing for magnification effect of AP projection.     Visualized osseous structures are intact.        IMPRESSION:   LEFT greater than RIGHT bilateral perihilar basilar airspace consolidations concerning for infectious pneumonia..

## 2020-03-09 NOTE — PROGRESS NOTE ADULT - ASSESSMENT
89 yo M as described above presenting with 3 day history of weakness/lethargy with associated nonproductive cough and decreased PO intake admitted for acute hypoxic respiratory failure secondary to initial suspicion for Community Acquired Pneumonia, CXR however without evidence of PNA, ?suspicion for possible rheumatologic etiology/flare given continue fever spike with negative infectious workup, and elevated ESR; pt with CRAIG on admission secondary to poor PO intake improved.    #Fever secondary to CAP  - recurrent fevers with crackles, CXR from today 3/9/20 see details from report- Likely PNA  - Will start on IV Cefepime 1000 mg q 12- ID in agreement with Abx   - Repeat cultures collected: blood, urine, sputum- will follow up   - initial Bcx  (3/2) with coag neg staph, likely contaminant  - Repeat BCx from 3/4 - Negative  - elevated ESR (46),  procalcitonin 0.24, elevated CRP 14.29  - Elevated CPK on 3/8: 769  - Rheumatology consulted, recs appreciated: unlikely inflammatory process based on present labs, will follow up  - Cont. motrin/tylenol for fevers  - Will continue to monitor    #Acute Repsiratory failure with hypoxia  - increasing requirement of O2 supplementation via NC  - Crackles on exam- hypoxia likely secondary to PNA  - Will switch duonebs to MDI albuterol and Tiotropium    #CRAIG-  - Cr up this AM- likely due to hypovolemia in the setting of poor PO intake due to illness  - May consider fluids- holding off for now due to respiratory status      #Thrombocytopenia  - improving  - Will continue to monitor    #Transaminitis  - Improving  - Will follow up with AM labs    #Hypothyroidism  - Continue home medication Synthroid    #HTN  - Continue Amlodipine    #CAD  - Continue ASA    #Weakness  - Encourage out of bed  - PT consulted      #DVT PPx  - IMPROVE Score: 2  - continue Heparin SQ    #Advanced directives  - HCP: daughter Aleja  - FULL CODE    Dispo: Continued hospitalization for fever secondary to Pneumonia      Case discussed with Dr. Whitten

## 2020-03-09 NOTE — PROGRESS NOTE ADULT - SUBJECTIVE AND OBJECTIVE BOX
Date of service: 20 @ 16:39    Events noted  Febrile to 102.2F  No cough  No SOB at rest  Weak looking; confused at times  Reported with new pulmonary infiltrates    ROS: no fever or chills; denies dizziness, no HA, no SOB or cough, no abdominal pain, no diarrhea or constipation; no dysuria, no legs pain, no rashes    MEDICATIONS  (STANDING):  amLODIPine   Tablet 2.5 milliGRAM(s) Oral daily  aspirin  chewable 81 milliGRAM(s) Oral daily  cefepime  Injectable.      cefepime  Injectable. 1000 milliGRAM(s) IV Push once  heparin  Injectable 5000 Unit(s) SubCutaneous three times a day  levothyroxine 50 MICROGram(s) Oral daily  multivitamin 1 Tablet(s) Oral daily  pantoprazole    Tablet 40 milliGRAM(s) Oral before breakfast  predniSONE   Tablet 5 milliGRAM(s) Oral daily      Vital Signs Last 24 Hrs  T(C): 36.6 (09 Mar 2020 11:20), Max: 39 (09 Mar 2020 00:54)  T(F): 97.8 (09 Mar 2020 11:20), Max: 102.2 (09 Mar 2020 00:54)  HR: 66 (09 Mar 2020 11:20) (66 - 90)  BP: 110/54 (09 Mar 2020 11:20) (103/39 - 140/68)  BP(mean): --  RR: 22 (09 Mar 2020 11:20) (18 - 32)  SpO2: 97% (09 Mar 2020 13:15) (89% - 97%)    Physical Exam:      Constitutional: frail looking  HEENT: NC/AT, EOMI, PERRLA, conjunctivae clear; ears and nose atraumatic; pharynx benign  Neck: supple; thyroid not palpable  Back: no tenderness  Respiratory: crackles at bases  Cardiovascular: S1S2 regular, no murmurs  Abdomen: soft, not tender, not distended, positive BS; liver and spleen WNL  Genitourinary: no suprapubic tenderness  Lymphatic: no LN palpable  Musculoskeletal: no muscle tenderness, no joint swelling or tenderness  Extremities: no pedal edema  Neurological/ Psychiatric: alert; moving all extremities  Skin: no rashes; no palpable lesions    Labs: all available labs reviewed                                              11.5   5.18  )-----------( 74       ( 06 Mar 2020 07:03 )             34.2     03-06    133<L>  |  103  |  14  ----------------------------<  86  3.3<L>   |  22  |  1.05    Ca    7.7<L>      06 Mar 2020 07:03    TPro  6.8  /  Alb  2.9<L>  /  TBili  0.5  /  DBili  x   /  AST  56<H>  /  ALT  55  /  AlkPhos  34<L>  03-05        Color: Yellow / Appearance: Clear / S.010 / pH: x  Gluc: x / Ketone: Negative  / Bili: Negative / Urobili: Negative mg/dL   Blood: x / Protein: 15 mg/dL / Nitrite: Negative   Leuk Esterase: Trace / RBC: 3-5 /HPF / WBC 0-2   Sq Epi: x / Non Sq Epi: Occasional / Bacteria: Negative      Culture - Blood (collected 04 Mar 2020 13:40)  Source: .Blood None  Preliminary Report (05 Mar 2020 19:03):    No growth to date.    Culture - Blood (collected 04 Mar 2020 11:33)  Source: .Blood None  Preliminary Report (05 Mar 2020 17:02):    No growth to date.    Culture - Urine (collected 02 Mar 2020 19:34)  Source: .Urine None  Final Report (04 Mar 2020 06:57):    >=3 organisms. Probable collection contamination.    Culture - Blood (collected 02 Mar 2020 17:53)  Source: .Blood Blood-Peripheral  Gram Stain (04 Mar 2020 04:43):    Growth in aerobic bottle:    Gram positive cocci in pairs  Final Report (05 Mar 2020 12:14):    Growth in aerobic bottle: Coag Negative Staphylococcus      -  Coagulase negative Staphylococcus: Detec      Method Type: PCR    Culture - Blood (collected 02 Mar 2020 17:53)  Source: .Blood Blood-Peripheral  Final Report (08 Mar 2020 01:00):    No growth at 5 days.        Radiology: all available radiological tests reviewed    < from: Xray Chest 1 View- PORTABLE-Urgent (20 @ 13:58) >  LEFT greater than RIGHT bilateral perihilar basilar airspace consolidations concerning for infectious pneumonia..    < end of copied text >      Advanced directives addressed: full resuscitation

## 2020-03-09 NOTE — PROGRESS NOTE ADULT - ASSESSMENT
89 yo M with Hx of HTN, HLD, CAD s/p cardiac stent placement, colon ca s/p resection, hypothyroidism presents with 3 day history of progressively worsening weakness and lethargy with associated dry cough. She notes patient generally sleeps alot during the day and easily falls asleep but notes that he was very lethargic requiring more effort to be aroused.  Daughter describes pt having a sick appearance. Pt has had decreased appetite. Pt has 2 recent sick contacts, son had flu and family co-worker was very ill recently. He is constipated intermittently, for which he has taken milk of magnesia. Denies any recent hospitalizations. Pt last travelled in Jan 2020 to [place] and has plans to travel there this week. In the ED pt was hypoxic to 89% on RA. Pt was given 3.1 L bolus of LR and IV ceftriaxone/azithromycin. Noted initially febrile 102, nl wbc ct, CT chest/abd/pelvis RML-RLL atelectasis, lingular pulm nodule.     1. Febrile syndrome. Bibasilar pulmonary airspace disease. Likely pneumonia. CRF stage 3. PMR. Immunocompromised host.   -fever is persistent  -s/p ceftriaxone and azithro several days ago  -repeat BC x 2  -obtain RVP, legionella and strep urine Ag  -start cefepime 1 gm IV q12h   -reason for abx use and side effects reviewed with patient; monitor BMP   -respiratory care  -rheumatology evaluation appreciated  -monitor temps  -f/u CBC  -supportive care  2. Other issues:   -care per medicine

## 2020-03-09 NOTE — CHART NOTE - NSCHARTNOTEFT_GEN_A_CORE
Notified by RN pt O2 Sat down to 85% on 3LNS, pt also febrile.       Vitals  T(F): 101.3 (03-09-20 @ 03:01), Max: 102.2 (03-09-20 @ 00:54)  HR: 78 (03-09-20 @ 03:01) (72 - 90)  BP: 122/44 (03-09-20 @ 03:01) (118/54 - 140/68)  RR: 25 (03-09-20 @ 03:01) (18 - 32)  SpO2: 93% (03-09-20 @ 03:01) (91% - 95%)        A/P  Pt placed on VM 40%, now with O2 Sat of 94%  Duoneb treatment given  S/p tylenol 650mg for fever, r/p temp of 101.3  Order motrin 400mg x1 dose   Continue to monitor vitals    Case d/w Dr Bower PGY-3 Notified by RN pt O2 Sat down to 85% on 3LNS, pt also febrile at 102.2. Was given tylenol.   Upon arrival to bedside, pt opened eyes to verbal command but appeared uncomfortable. Pt was placed on 40%VM O2 Sat of 94%. Nodded head when asked if having difficulty breathing.    Pt was given duoneb treatment and re-evaluated. Pt nodded when asked if feeling better. Noted to be tremulous during re-examination. Pt refused to speak when asked how he was feeling but able to nod or shake head to questioning. Pt noted to be minimally coughing upon arrival for reassessment.    Vitals  T(F): 101.3 (03-09-20 @ 03:01), Max: 102.2 (03-09-20 @ 00:54)  HR: 78 (03-09-20 @ 03:01) (72 - 90)  BP: 122/44 (03-09-20 @ 03:01) (118/54 - 140/68)  RR: 25 (03-09-20 @ 03:01) (18 - 32)  SpO2: 93% (03-09-20 @ 03:01) (91% - 95%)      Physical Exam   Gen: weak appearing, lethargic  CV: RRR, nl s1/s2, no M/R/G  Pulm: tachypneic, diffuse expiratory wheezing B/L, improvement with duoneb treatment  Abd: normoactive bowel sounds in all 4 quadrants, soft, nontender, nondistended, no rebound, no guarding, no masses  Extremities: no pedal edema, pedal pulses palpable   Skin: nl warm and dry, no wounds     A/P  89M w/ Hx of HTN, HLD, CAD s/p cardiac stent placement, colon ca s/p resection, hypothyroidism admitted for acute respiratory failure with hypoxia and fever with unspecified cause now febrile and O2 desat and wheezing.    Plan  Pt placed on VM 40%, now with O2 Sat of 94%  Duoneb treatment given  S/p tylenol 650mg for fever, r/p temp of 101.3  Order motrin 400mg x1 dose   Cooling blanket given  Continue to monitor vitals    Case d/w Dr Bower PGY-3

## 2020-03-10 LAB
ALBUMIN SERPL ELPH-MCNC: 2.9 G/DL — LOW (ref 3.3–5)
ALP SERPL-CCNC: 40 U/L — SIGNIFICANT CHANGE UP (ref 40–120)
ALT FLD-CCNC: 67 U/L — SIGNIFICANT CHANGE UP (ref 12–78)
ANA TITR SER: NEGATIVE — SIGNIFICANT CHANGE UP
ANION GAP SERPL CALC-SCNC: 11 MMOL/L — SIGNIFICANT CHANGE UP (ref 5–17)
AST SERPL-CCNC: 146 U/L — HIGH (ref 15–37)
AUTO DIFF PNL BLD: NEGATIVE — SIGNIFICANT CHANGE UP
BASE EXCESS BLDA CALC-SCNC: -1.5 MMOL/L — SIGNIFICANT CHANGE UP (ref -2–2)
BASE EXCESS BLDA CALC-SCNC: -2.9 MMOL/L — LOW (ref -2–2)
BASE EXCESS BLDA CALC-SCNC: -3 MMOL/L — LOW (ref -2–2)
BILIRUB SERPL-MCNC: 0.8 MG/DL — SIGNIFICANT CHANGE UP (ref 0.2–1.2)
BLOOD GAS COMMENTS ARTERIAL: SIGNIFICANT CHANGE UP
BUN SERPL-MCNC: 28 MG/DL — HIGH (ref 7–23)
C-ANCA SER-ACNC: NEGATIVE — SIGNIFICANT CHANGE UP
CALCIUM SERPL-MCNC: 8.8 MG/DL — SIGNIFICANT CHANGE UP (ref 8.5–10.1)
CHLORIDE SERPL-SCNC: 104 MMOL/L — SIGNIFICANT CHANGE UP (ref 96–108)
CO2 SERPL-SCNC: 22 MMOL/L — SIGNIFICANT CHANGE UP (ref 22–31)
CREAT SERPL-MCNC: 1.42 MG/DL — HIGH (ref 0.5–1.3)
GAS PNL BLDA: SIGNIFICANT CHANGE UP
GAS PNL BLDA: SIGNIFICANT CHANGE UP
GLUCOSE SERPL-MCNC: 92 MG/DL — SIGNIFICANT CHANGE UP (ref 70–99)
HCO3 BLDA-SCNC: 21 MMOL/L — SIGNIFICANT CHANGE UP (ref 21–29)
HCO3 BLDA-SCNC: 21 MMOL/L — SIGNIFICANT CHANGE UP (ref 21–29)
HCO3 BLDA-SCNC: 23 MMOL/L — SIGNIFICANT CHANGE UP (ref 21–29)
HCT VFR BLD CALC: 39.3 % — SIGNIFICANT CHANGE UP (ref 39–50)
HGB BLD-MCNC: 13.1 G/DL — SIGNIFICANT CHANGE UP (ref 13–17)
MCHC RBC-ENTMCNC: 29.4 PG — SIGNIFICANT CHANGE UP (ref 27–34)
MCHC RBC-ENTMCNC: 33.3 GM/DL — SIGNIFICANT CHANGE UP (ref 32–36)
MCV RBC AUTO: 88.1 FL — SIGNIFICANT CHANGE UP (ref 80–100)
P-ANCA SER-ACNC: NEGATIVE — SIGNIFICANT CHANGE UP
PCO2 BLDA: 31 MMHG — LOW (ref 32–46)
PCO2 BLDA: 37 MMHG — SIGNIFICANT CHANGE UP (ref 32–46)
PCO2 BLDA: 48 MMHG — HIGH (ref 32–46)
PH BLDA: 7.31 — LOW (ref 7.35–7.45)
PH BLDA: 7.38 — SIGNIFICANT CHANGE UP (ref 7.35–7.45)
PH BLDA: 7.45 — SIGNIFICANT CHANGE UP (ref 7.35–7.45)
PLATELET # BLD AUTO: 107 K/UL — LOW (ref 150–400)
PO2 BLDA: 59 MMHG — LOW (ref 74–108)
PO2 BLDA: 70 MMHG — LOW (ref 74–108)
PO2 BLDA: 82 MMHG — SIGNIFICANT CHANGE UP (ref 74–108)
POTASSIUM SERPL-MCNC: 4 MMOL/L — SIGNIFICANT CHANGE UP (ref 3.5–5.3)
POTASSIUM SERPL-SCNC: 4 MMOL/L — SIGNIFICANT CHANGE UP (ref 3.5–5.3)
PROT SERPL-MCNC: 7.5 GM/DL — SIGNIFICANT CHANGE UP (ref 6–8.3)
RBC # BLD: 4.46 M/UL — SIGNIFICANT CHANGE UP (ref 4.2–5.8)
RBC # FLD: 14.6 % — HIGH (ref 10.3–14.5)
SAO2 % BLDA: 90 % — LOW (ref 92–96)
SAO2 % BLDA: 90 % — LOW (ref 92–96)
SAO2 % BLDA: 94 % — SIGNIFICANT CHANGE UP (ref 92–96)
SODIUM SERPL-SCNC: 137 MMOL/L — SIGNIFICANT CHANGE UP (ref 135–145)
WBC # BLD: 9.14 K/UL — SIGNIFICANT CHANGE UP (ref 3.8–10.5)
WBC # FLD AUTO: 9.14 K/UL — SIGNIFICANT CHANGE UP (ref 3.8–10.5)

## 2020-03-10 PROCEDURE — 99291 CRITICAL CARE FIRST HOUR: CPT | Mod: 25

## 2020-03-10 PROCEDURE — 71045 X-RAY EXAM CHEST 1 VIEW: CPT | Mod: 26

## 2020-03-10 PROCEDURE — 99292 CRITICAL CARE ADDL 30 MIN: CPT | Mod: 25

## 2020-03-10 PROCEDURE — 99233 SBSQ HOSP IP/OBS HIGH 50: CPT | Mod: GC

## 2020-03-10 PROCEDURE — 31500 INSERT EMERGENCY AIRWAY: CPT

## 2020-03-10 PROCEDURE — 36620 INSERTION CATHETER ARTERY: CPT

## 2020-03-10 RX ORDER — ACETAMINOPHEN 500 MG
1000 TABLET ORAL ONCE
Refills: 0 | Status: COMPLETED | OUTPATIENT
Start: 2020-03-10 | End: 2020-03-10

## 2020-03-10 RX ORDER — VANCOMYCIN HCL 1 G
1250 VIAL (EA) INTRAVENOUS ONCE
Refills: 0 | Status: COMPLETED | OUTPATIENT
Start: 2020-03-10 | End: 2020-03-10

## 2020-03-10 RX ORDER — VANCOMYCIN HCL 1 G
1250 VIAL (EA) INTRAVENOUS EVERY 24 HOURS
Refills: 0 | Status: DISCONTINUED | OUTPATIENT
Start: 2020-03-11 | End: 2020-03-15

## 2020-03-10 RX ORDER — SODIUM CHLORIDE 9 MG/ML
1000 INJECTION, SOLUTION INTRAVENOUS
Refills: 0 | Status: DISCONTINUED | OUTPATIENT
Start: 2020-03-10 | End: 2020-03-14

## 2020-03-10 RX ORDER — PROPOFOL 10 MG/ML
30 INJECTION, EMULSION INTRAVENOUS
Qty: 1000 | Refills: 0 | Status: DISCONTINUED | OUTPATIENT
Start: 2020-03-10 | End: 2020-03-16

## 2020-03-10 RX ORDER — CHLORHEXIDINE GLUCONATE 213 G/1000ML
15 SOLUTION TOPICAL EVERY 12 HOURS
Refills: 0 | Status: DISCONTINUED | OUTPATIENT
Start: 2020-03-10 | End: 2020-03-15

## 2020-03-10 RX ORDER — PROPOFOL 10 MG/ML
5 INJECTION, EMULSION INTRAVENOUS
Qty: 1000 | Refills: 0 | Status: DISCONTINUED | OUTPATIENT
Start: 2020-03-10 | End: 2020-03-10

## 2020-03-10 RX ORDER — PANTOPRAZOLE SODIUM 20 MG/1
40 TABLET, DELAYED RELEASE ORAL DAILY
Refills: 0 | Status: DISCONTINUED | OUTPATIENT
Start: 2020-03-10 | End: 2020-03-16

## 2020-03-10 RX ORDER — ACETAMINOPHEN 500 MG
650 TABLET ORAL EVERY 6 HOURS
Refills: 0 | Status: DISCONTINUED | OUTPATIENT
Start: 2020-03-10 | End: 2020-03-16

## 2020-03-10 RX ORDER — LEVOTHYROXINE SODIUM 125 MCG
25 TABLET ORAL AT BEDTIME
Refills: 0 | Status: DISCONTINUED | OUTPATIENT
Start: 2020-03-10 | End: 2020-03-15

## 2020-03-10 RX ORDER — PHENYLEPHRINE HYDROCHLORIDE 10 MG/ML
0.1 INJECTION INTRAVENOUS
Qty: 40 | Refills: 0 | Status: DISCONTINUED | OUTPATIENT
Start: 2020-03-10 | End: 2020-03-12

## 2020-03-10 RX ORDER — CEFEPIME 1 G/1
2000 INJECTION, POWDER, FOR SOLUTION INTRAMUSCULAR; INTRAVENOUS EVERY 12 HOURS
Refills: 0 | Status: DISCONTINUED | OUTPATIENT
Start: 2020-03-10 | End: 2020-03-11

## 2020-03-10 RX ORDER — VANCOMYCIN HCL 1 G
VIAL (EA) INTRAVENOUS
Refills: 0 | Status: DISCONTINUED | OUTPATIENT
Start: 2020-03-10 | End: 2020-03-15

## 2020-03-10 RX ADMIN — HEPARIN SODIUM 5000 UNIT(S): 5000 INJECTION INTRAVENOUS; SUBCUTANEOUS at 16:31

## 2020-03-10 RX ADMIN — Medication 25 MICROGRAM(S): at 21:18

## 2020-03-10 RX ADMIN — SODIUM CHLORIDE 100 MILLILITER(S): 9 INJECTION, SOLUTION INTRAVENOUS at 18:25

## 2020-03-10 RX ADMIN — HEPARIN SODIUM 5000 UNIT(S): 5000 INJECTION INTRAVENOUS; SUBCUTANEOUS at 06:46

## 2020-03-10 RX ADMIN — PHENYLEPHRINE HYDROCHLORIDE 3.74 MICROGRAM(S)/KG/MIN: 10 INJECTION INTRAVENOUS at 18:24

## 2020-03-10 RX ADMIN — CHLORHEXIDINE GLUCONATE 15 MILLILITER(S): 213 SOLUTION TOPICAL at 17:48

## 2020-03-10 RX ADMIN — HEPARIN SODIUM 5000 UNIT(S): 5000 INJECTION INTRAVENOUS; SUBCUTANEOUS at 21:02

## 2020-03-10 RX ADMIN — PROPOFOL 18 MICROGRAM(S)/KG/MIN: 10 INJECTION, EMULSION INTRAVENOUS at 20:41

## 2020-03-10 RX ADMIN — PHENYLEPHRINE HYDROCHLORIDE 3.74 MICROGRAM(S)/KG/MIN: 10 INJECTION INTRAVENOUS at 21:18

## 2020-03-10 RX ADMIN — Medication 40 MILLIGRAM(S): at 21:02

## 2020-03-10 RX ADMIN — CEFEPIME 1000 MILLIGRAM(S): 1 INJECTION, POWDER, FOR SOLUTION INTRAMUSCULAR; INTRAVENOUS at 06:46

## 2020-03-10 RX ADMIN — Medication 400 MILLIGRAM(S): at 11:43

## 2020-03-10 RX ADMIN — Medication 166.67 MILLIGRAM(S): at 16:31

## 2020-03-10 RX ADMIN — CEFEPIME 2000 MILLIGRAM(S): 1 INJECTION, POWDER, FOR SOLUTION INTRAMUSCULAR; INTRAVENOUS at 17:48

## 2020-03-10 RX ADMIN — PROPOFOL 2.99 MICROGRAM(S)/KG/MIN: 10 INJECTION, EMULSION INTRAVENOUS at 16:44

## 2020-03-10 NOTE — PROGRESS NOTE ADULT - ASSESSMENT
89 yo male, PMHx HTN, HLD, CAD s/p PCI, hypothyroid, admitted to hospital with SIRS, persistent fevers. ID and Rheum workup to date unable to account for fevers. Progressed to severe sepsis with shock, acute hypoxemic respiratory failure, severe ARDS, possible HAP, r/o COVID-19, with CRAIG, transaminitis.    Deepened sedation to improve oxygenation    PEEP weaned to 12, remains on 100% FiO2    Will f/u repeat ABG and continue to augment to maintain pH >7.25 and paO2 >70    Will make NPO in the event patient requires further sedation and proning    Phenylephrine being titrated to keep MAP >65 89 yo male, PMHx HTN, HLD, CAD s/p PCI, hypothyroid, admitted to hospital with SIRS, persistent fevers. ID and Rheum workup to date unable to account for fevers. Progressed to severe sepsis with shock, acute hypoxemic respiratory failure, severe ARDS, possible HAP, r/o COVID-19, with CRAIG, transaminitis.    In the presence of ongoing severe sepsis with progression to ARDS in the absence of any other focus of infection or rheumatologic source of fever, high suspicion for COVID-19. Also further consistent with evidence of lymphopenia, thrombocytopenia, transaminitis, elevated CRP, negative PCT.    Deepened sedation to improve oxygenation    PEEP weaned to 12, remains on 100% FiO2. Will decrease Tv to 4-6cc/kg IBW per ARDSnet protocol    Will f/u repeat ABG and continue to augment to maintain pH >7.30 and paO2 >70    Will make NPO in the event patient requires further sedation and proning    Phenylephrine being titrated to keep MAP >65, monitoring end points of perfusion    Empiric abx broadened to Cefepime and Vancomycin. U Legionella Ag and Strep pneumo sent and are pending.    Contact and Airborne Precautions in place - ruling out COVID-19. Specimen drawn and is being sent STAT via  (coordinating with Nursing Supervisor and Lab Supervisor)

## 2020-03-10 NOTE — PROVIDER CONTACT NOTE (OTHER) - BACKGROUND
ordered another dose of IV tylenol IV, see order flowsheet. Pt on cooling blanket at this time. Reassessed temp and now 101.9. Pt VSS otherwise. Pt resting in bed at this time. DNR form filled out

## 2020-03-10 NOTE — PROCEDURE NOTE - NSPROCDETAILS_GEN_ALL_CORE
positive blood return obtained via catheter/Seldinger technique/all materials/supplies accounted for at end of procedure/connected to a pressurized flush line/hemostasis with direct pressure, dressing applied/sutured in place

## 2020-03-10 NOTE — PROGRESS NOTE ADULT - SUBJECTIVE AND OBJECTIVE BOX
Date of service: 03-10-20 @ 11:06    PT seen and examined  Persistently febrile  noted with labored breathing/tachypnea o/n, placed on bipap  tx to stepdown unit  remains on bipap this am on 100%Fi02  lethargic appearing    ROS: unable to obtain d/t medical condition    MEDICATIONS  (STANDING):  amLODIPine   Tablet 2.5 milliGRAM(s) Oral daily  aspirin  chewable 81 milliGRAM(s) Oral daily  cefepime  Injectable. 2000 milliGRAM(s) IV Push every 12 hours  heparin  Injectable 5000 Unit(s) SubCutaneous three times a day  levothyroxine 50 MICROGram(s) Oral daily  multivitamin 1 Tablet(s) Oral daily  pantoprazole    Tablet 40 milliGRAM(s) Oral before breakfast  predniSONE   Tablet 5 milliGRAM(s) Oral daily  vancomycin  IVPB 1250 milliGRAM(s) IV Intermittent once  vancomycin  IVPB        Vital Signs Last 24 Hrs  T(C): 38.7 (10 Mar 2020 13:30), Max: 39.3 (10 Mar 2020 00:00)  T(F): 101.7 (10 Mar 2020 13:30), Max: 102.8 (10 Mar 2020 00:00)  HR: 78 (10 Mar 2020 13:30) (75 - 114)  BP: 105/43 (10 Mar 2020 13:30) (104/47 - 152/99)  BP(mean): 56 (10 Mar 2020 13:30) (56 - 119)  RR: 30 (10 Mar 2020 13:30) (18 - 33)  SpO2: 89% (10 Mar 2020 13:30) (75% - 95%)    Physical Exam:  Constitutional: frail looking on bipap   HEENT: NC/AT, EOMI, PERRLA, conjunctivae clear; ears and nose atraumatic; pharynx benign  Neck: supple; thyroid not palpable  Back: no tenderness  Respiratory: crackles at bases  Cardiovascular: S1S2 regular, no murmurs  Abdomen: soft, not tender, not distended, positive BS; liver and spleen WNL  Genitourinary: no suprapubic tenderness  Lymphatic: no LN palpable  Musculoskeletal: no muscle tenderness, no joint swelling or tenderness  Extremities: no pedal edema  Neurological/ Psychiatric: alert; moving all extremities  Skin: no rashes; no palpable lesions    Labs: all available labs reviewed                                                         13.1   9.14  )-----------( 107      ( 10 Mar 2020 08:03 )             39.3     03-10    137  |  104  |  28<H>  ----------------------------<  92  4.0   |  22  |  1.42<H>    Ca    8.8      10 Mar 2020 08:03    TPro  7.5  /  Alb  2.9<L>  /  TBili  0.8  /  DBili  x   /  AST  146<H>  /  ALT  67  /  AlkPhos  40  03-10        Color: Yellow / Appearance: Clear / S.010 / pH: x  Gluc: x / Ketone: Negative  / Bili: Negative / Urobili: Negative mg/dL   Blood: x / Protein: 15 mg/dL / Nitrite: Negative   Leuk Esterase: Trace / RBC: 3-5 /HPF / WBC 0-2   Sq Epi: x / Non Sq Epi: Occasional / Bacteria: Negative      Culture - Blood (collected 04 Mar 2020 13:40)  Source: .Blood None  Preliminary Report (05 Mar 2020 19:03):    No growth to date.    Culture - Blood (collected 04 Mar 2020 11:33)  Source: .Blood None  Preliminary Report (05 Mar 2020 17:02):    No growth to date.    Culture - Urine (collected 02 Mar 2020 19:34)  Source: .Urine None  Final Report (04 Mar 2020 06:57):    >=3 organisms. Probable collection contamination.    Culture - Blood (collected 02 Mar 2020 17:53)  Source: .Blood Blood-Peripheral  Gram Stain (04 Mar 2020 04:43):    Growth in aerobic bottle:    Gram positive cocci in pairs  Final Report (05 Mar 2020 12:14):    Growth in aerobic bottle: Coag Negative Staphylococcus      -  Coagulase negative Staphylococcus: Detec      Method Type: PCR    Culture - Blood (collected 02 Mar 2020 17:53)  Source: .Blood Blood-Peripheral  Final Report (08 Mar 2020 01:00):    No growth at 5 days.        Radiology: all available radiological tests reviewed    < from: Xray Chest 1 View- PORTABLE-Urgent (20 @ 13:58) >  LEFT greater than RIGHT bilateral perihilar basilar airspace consolidations concerning for infectious pneumonia..    < end of copied text >      Advanced directives addressed: full resuscitation

## 2020-03-10 NOTE — PROGRESS NOTE ADULT - SUBJECTIVE AND OBJECTIVE BOX
89M with HTN, HLD, CAD (s/p cardiac stent), colon cancer (s/p resection), and hypothyroidism presented for 3 days nonproductive cough and progressive lethargy and weakness. Found to have PNA on CXR.    3/10/20, 8AM: Patient endorsing back pain but denying dyspnea. Febrile overnight and requiring BIPAP at 100%. No PO intake as patient on BIPAP.     Vital Signs Last 24 Hrs  T(C): 38.7 (10 Mar 2020 13:30), Max: 39.3 (10 Mar 2020 00:00)  T(F): 101.7 (10 Mar 2020 13:30), Max: 102.8 (10 Mar 2020 00:00)  HR: 97 (10 Mar 2020 15:26) (75 - 114)  BP: 105/43 (10 Mar 2020 13:30) (104/47 - 152/99)  BP(mean): 56 (10 Mar 2020 13:30) (56 - 119)  RR: 30 (10 Mar 2020 13:30) (18 - 33)  SpO2: 91% (10 Mar 2020 15:26) (75% - 95%)    Physical Exam  Gen: Ill-appearing in respiratory distress  CV: Difficult to auscultate due to breath sounds. No murmurs auscultated  Pulm: Crackles at right lower lung base  Abd: Soft, NT, ND  Ext: No LE edema  Neuro: Grossly intact    MEDICATIONS  (STANDING):  amLODIPine   Tablet 2.5 milliGRAM(s) Oral daily  aspirin  chewable 81 milliGRAM(s) Oral daily  cefepime  Injectable. 2000 milliGRAM(s) IV Push every 12 hours  chlorhexidine 0.12% Liquid 15 milliLiter(s) Oral Mucosa every 12 hours  heparin  Injectable 5000 Unit(s) SubCutaneous three times a day  levothyroxine 50 MICROGram(s) Oral daily  multivitamin 1 Tablet(s) Oral daily  pantoprazole    Tablet 40 milliGRAM(s) Oral before breakfast  propofol Infusion 5 MICROgram(s)/kG/Min (2.99 mL/Hr) IV Continuous <Continuous>  vancomycin  IVPB 1250 milliGRAM(s) IV Intermittent once  vancomycin  IVPB        MEDICATIONS  (PRN):  acetaminophen   Tablet .. 650 milliGRAM(s) Oral every 6 hours PRN Temp greater or equal to 38C (100.4F), Mild Pain (1 - 3)  ALBUTerol    90 MICROgram(s) HFA Inhaler 1 Puff(s) Inhalation every 4 hours PRN Shortness of Breath and/or Wheezing  tiotropium 18 MICROgram(s) Capsule 1 Capsule(s) Inhalation daily PRN Shortness of breath      LABS:  cret                        13.1   9.14  )-----------( 107      ( 10 Mar 2020 08:03 )             39.3     03-10    137  |  104  |  28<H>  ----------------------------<  92  4.0   |  22  |  1.42<H>      Ca    8.8      10 Mar 2020 08:03    TPro  7.5  /  Alb  2.9<L>  /  TBili  0.8  /  DBili  x   /  AST  146<H>  /  ALT  67  /  AlkPhos  40  03-10        Blood Gas Profile - Arterial (03.10.20 @ 12:44)    pH, Arterial: 7.45    pCO2, Arterial: 31 mmHg    pO2, Arterial: 59 mmHg    HCO3, Arterial: 21 mmoL/L    Base Excess, Arterial: -1.5 mmol/L    Oxygen Saturation, Arterial: 90 %    Blood Gas Comments Arterial: FIO2:_  MODE:_   VT:_   RATE:_   PEEP:_  Comment:_12/6/100%\    < from: Xray Chest 1 View- PORTABLE-Urgent (03.09.20 @ 13:58) >  LEFT greater than RIGHT bilateral perihilar basilar airspace consolidations concerning for infectious pneumonia..      < end of copied text >

## 2020-03-10 NOTE — PROGRESS NOTE ADULT - ASSESSMENT
89 yo M with Hx of HTN, HLD, CAD s/p cardiac stent placement, colon ca s/p resection, hypothyroidism presents with 3 day history of progressively worsening weakness and lethargy with associated dry cough. She notes patient generally sleeps alot during the day and easily falls asleep but notes that he was very lethargic requiring more effort to be aroused.  Daughter describes pt having a sick appearance. Pt has had decreased appetite. Pt has 2 recent sick contacts, son had flu and family co-worker was very ill recently. He is constipated intermittently, for which he has taken milk of magnesia. Denies any recent hospitalizations. Pt last travelled in Jan 2020 to [place] and has plans to travel there this week. In the ED pt was hypoxic to 89% on RA. Pt was given 3.1 L bolus of LR and IV ceftriaxone/azithromycin. Noted initially febrile 102, nl wbc ct, CT chest/abd/pelvis RML-RLL atelectasis, lingular pulm nodule.     1. Acute respiratory failure. Febrile syndrome. Bibasilar pulmonary airspace disease. Pneumonia. CRF stage 3. PMR. Immunocompromised host.   - persistent fevers; now hypoxic   - suggest repeat CT chest/abd/pelvis   - may require intubation/tx to ICU  - on IV cefepime [id number]  - start vancomycin [id number] check trough prior to 4th dose   - RVP 3/9, 3/2 (-) f/u legionella ag/strep urine ag   - check sputum cx   - CK/ferritin elevated, plan for MRI femur when able r/o myositis   - reason for abx use and side effects reviewed with patient; monitor BMP   - respiratory care  - rheumatology evaluation appreciated  - s/p ceftriaxone and azithro several days ago  - monitor temps  - f/u CBC  - supportive care    2. Other issues:   -care per medicine 89 yo M with Hx of HTN, HLD, CAD s/p cardiac stent placement, colon ca s/p resection, hypothyroidism presents with 3 day history of progressively worsening weakness and lethargy with associated dry cough. She notes patient generally sleeps alot during the day and easily falls asleep but notes that he was very lethargic requiring more effort to be aroused.  Daughter describes pt having a sick appearance. Pt has had decreased appetite. Pt has 2 recent sick contacts, son had flu and family co-worker was very ill recently. He is constipated intermittently, for which he has taken milk of magnesia. Denies any recent hospitalizations. Pt last travelled in Jan 2020 to [place] and has plans to travel there this week. In the ED pt was hypoxic to 89% on RA. Pt was given 3.1 L bolus of LR and IV ceftriaxone/azithromycin. Noted initially febrile 102, nl wbc ct, CT chest/abd/pelvis RML-RLL atelectasis, lingular pulm nodule.     1. Acute respiratory failure. Febrile syndrome. Bibasilar pulmonary airspace disease. Pneumonia. CRF stage 3. PMR. Immunocompromised host.   - persistent fevers; now hypoxic   - suggest repeat CT chest/abd/pelvis   - may require intubation/tx to ICU  - on IV cefepime [id number]  - start vancomycin [id number] check trough prior to 4th dose   - RVP 3/9, 3/2 (-) f/u legionella ag/strep urine ag   - check sputum cx   - CK/ferritin elevated, plan for MRI femur when able r/o myositis   - reason for abx use and side effects reviewed with patient; monitor BMP   - rheumatology evaluation appreciated  - respiratory care  - s/p ceftriaxone and azithro several days ago  - TTE reviewed, no vegetations   - monitor temps  - f/u CBC  - supportive care    2. Other issues:   -care per medicine

## 2020-03-10 NOTE — PROVIDER CONTACT NOTE (CHANGE IN STATUS NOTIFICATION) - ACTION/TREATMENT ORDERED:
Made aware, critical care consulted, pt transferred to ICU.
Dr. Gomez ordered to give Ibuprofen once now.  Continue to monitor patient.
100% NRB placed on pt., then replaced with Bi-Pap machine and Tx to SSD.

## 2020-03-10 NOTE — PROGRESS NOTE ADULT - SUBJECTIVE AND OBJECTIVE BOX
Called to see the patient in the SDU for Hypoxic respiratory failure    Patient is a 89 year old male who had presented with weakness and nonproductive cough.  He had fevers since admission and eventually developed B/L infiltrates and went on NIV on 3/8-3/9.  He had an extensive work up for this illness and to date it has been negative including 2 RVPs.  Legionella urine ag and strp ag are PND.  Patient was transferred to the ICU and required intubation.    Once intubated he is on 100% and 15 PEEP.           PAST MEDICAL & SURGICAL HISTORY:  S/P coronary artery stent placement  Hypothyroidism  Colon cancer  HLD (hyperlipidemia)  HTN (hypertension)  Dementia  No significant past surgical history      FAMILY HISTORY:  No pertinent family history in first degree relatives      Social Hx:    Allergies    penicillins (Unknown)    Intolerances            ICU Vital Signs Last 24 Hrs  T(C): 35.6 (10 Mar 2020 17:00), Max: 39.3 (10 Mar 2020 00:00)  T(F): 96.1 (10 Mar 2020 17:00), Max: 102.8 (10 Mar 2020 00:00)  HR: 82 (10 Mar 2020 17:29) (75 - 114)  BP: 91/37 (10 Mar 2020 17:00) (91/37 - 175/63)  BP(mean): 51 (10 Mar 2020 17:00) (51 - 119)  ABP: 95/43 (10 Mar 2020 17:00) (95/43 - 188/68)  ABP(mean): 57 (10 Mar 2020 17:00) (57 - 110)  RR: 23 (10 Mar 2020 17:00) (18 - 33)  SpO2: 97% (10 Mar 2020 17:29) (75% - 97%)      Mode: AC/ CMV (Assist Control/ Continuous Mandatory Ventilation)  RR (machine): 16  TV (machine): 550  FiO2: 100  PEEP: 15  ITime: 1  PIP: 32      I&O's Summary    09 Mar 2020 07:01  -  10 Mar 2020 07:00  --------------------------------------------------------  IN: 200 mL / OUT: 0 mL / NET: 200 mL    10 Mar 2020 07:01  -  10 Mar 2020 18:28  --------------------------------------------------------  IN: 0 mL / OUT: 600 mL / NET: -600 mL                              13.1   9.14  )-----------( 107      ( 10 Mar 2020 08:03 )             39.3       03-10    137  |  104  |  28<H>  ----------------------------<  92  4.0   |  22  |  1.42<H>    Ca    8.8      10 Mar 2020 08:03    TPro  7.5  /  Alb  2.9<L>  /  TBili  0.8  /  DBili  x   /  AST  146<H>  /  ALT  67  /  AlkPhos  40  03-10            ABG - ( 10 Mar 2020 15:57 )  pH, Arterial: 7.31  pH, Blood: x     /  pCO2: 48    /  pO2: 70    / HCO3: 23    / Base Excess: -3.0  /  SaO2: 90                      MEDICATIONS  (STANDING):  amLODIPine   Tablet 2.5 milliGRAM(s) Oral daily  aspirin  chewable 81 milliGRAM(s) Oral daily  cefepime  Injectable. 2000 milliGRAM(s) IV Push every 12 hours  chlorhexidine 0.12% Liquid 15 milliLiter(s) Oral Mucosa every 12 hours  heparin  Injectable 5000 Unit(s) SubCutaneous three times a day  lactated ringers. 1000 milliLiter(s) (100 mL/Hr) IV Continuous <Continuous>  levothyroxine 50 MICROGram(s) Oral daily  methylPREDNISolone sodium succinate Injectable 40 milliGRAM(s) IV Push every 8 hours  multivitamin 1 Tablet(s) Oral daily  pantoprazole    Tablet 40 milliGRAM(s) Oral before breakfast  phenylephrine    Infusion 0.1 MICROgram(s)/kG/Min (3.74 mL/Hr) IV Continuous <Continuous>  propofol Infusion 5 MICROgram(s)/kG/Min (2.99 mL/Hr) IV Continuous <Continuous>  vancomycin  IVPB        MEDICATIONS  (PRN):  acetaminophen   Tablet .. 650 milliGRAM(s) Oral every 6 hours PRN Temp greater or equal to 38C (100.4F), Mild Pain (1 - 3)  ALBUTerol    90 MICROgram(s) HFA Inhaler 1 Puff(s) Inhalation every 4 hours PRN Shortness of Breath and/or Wheezing  tiotropium 18 MICROgram(s) Capsule 1 Capsule(s) Inhalation daily PRN Shortness of breath      DVT Prophylaxis: SQH    Advanced Directives:  Discussed with:    Visit Information: 90 minutes    ** Time is exclusive of billed procedures and/or teaching and/or routine family updates.

## 2020-03-10 NOTE — PROCEDURE NOTE - NSTRACHPOSTINTU_RESP_A_CORE
Appropriate capnography/Breath sounds equal/Chest excursion noted/Chest X-Ray/Breath sounds bilateral

## 2020-03-10 NOTE — PROVIDER CONTACT NOTE (CHANGE IN STATUS NOTIFICATION) - SITUATION
Pt with increase lethargy, increased temp 101.8, Increased RR and decreased O2 sat on 100% continuous BiPap Fio2 100% see flowsheet

## 2020-03-10 NOTE — PROGRESS NOTE ADULT - ASSESSMENT
89M with HTN, HLD, CAD (s/p cardiac stent), colon cancer (s/p resection), and hypothyroidism presented for 3 days nonproductive cough and progressive lethargy and weakness found to have PNA, this morning with declining respiratory status.     #PNA with acute hypoxic respiratory failure  -This morning, requiring BIPAP at 100%  -Continue cefepime. Appreciate ID recommendation; vancomycin added to broaden coverage  -Patient tachypeneic and hypoxic despite BIPAP at 100%. Concern for fatigue; high risk of requiring intubation. Trial of intubation allowed as per family. ICU consulted and patient transferred to the ICU for closer monitoring. Care transferred to the ICU team  -Tylenol and motrin PRN fever  -Albuterol and tiotropium    #CRAIG  -Likely hypovolemia in the setting of no PO intake  -Will hold off on fluids given worsening respiratory status     #Hypothyroidism - Continue synthroid  #HTN - Continue amlodipine  #CAD - Continue aspirin   #DVT ppx - Heparin subcu  #Advance directives - DNR, trial of intubation. HCP daughter Aleja

## 2020-03-10 NOTE — PROGRESS NOTE ADULT - ATTENDING COMMENTS
Patient seen and examined with Family Medicine Residents Elke Perez, Bill Simon, Lexa Kim and Pharmacy Resident Lizbet King on the Family Medicine Teaching Service.  Agree with history, physical, labs and plan which were reviewed in detail after a face to face encounter with the patient.
Patient seen and examined with Family Medicine Residents Elke Perez, Jaimie Jimenez and Pharmacy Resident Lizbet King on the Family Medicine Teaching Service.  Agree with history, physical, labs and plan which were reviewed in detail after a face to face encounter with the patient.
As above, pt seen and examined with house staff and treatment plan formulated on rounds.
As above, pt seen and examined with house staff/NP student Gabbie and treatment plan formulated on rounds.     No new Cxs and no clear source of infection.  Normal WBC.  non toxic appearing.    I am leaning towards non bacterial infection cause of fever
As above, pt seen and examined with house staff and treatment plan formulated on rounds.
As above, pt seen and examined with NP student Gabbie and house staff and treatment plan formulated on rounds.     Working up non infectious causes of fever
on exam- comfortable   -[id number]-aeeb, cta  -p/a-soft, bs+  -cvs-s1s2 normal    # ct abx, supportive care       #dvt pr

## 2020-03-10 NOTE — PROGRESS NOTE ADULT - SUBJECTIVE AND OBJECTIVE BOX
Patient is a 89y old  Male who presents with a chief complaint of Acute hypoxic respiratory failure 2/2 CAP (10 Mar 2020 18:28)      BRIEF HOSPITAL COURSE: 89 yo male, PMHx HTN, HLD, CAD s/p PCI, hypothyroid, who presented from home with lethargy, cough, shortness of breath, fevers on 3/2. Patient has undergone extensive ID workup, which has thus far been negative. Labs negative for lactate, PCT, or leukocytosis. Notable for CRAIG and transaminitis. CT chest/abd/pelv only revealing of RML/RLL atelectasis. RVP has been negative. Initial BCx positive for Coag negative Staph in 1 bottle, which was likely a contaminant. Thus far, repeat bcx have all since been negative. UCx negative. Rheumatology was consulted, and workup has not identified a source of fever. Patient has remained febrile with Tmax ranging 102-104'F throughout hospital stay. His hospital course has been prolonged by worsening hypoxemia, developing bibasilar infiltrates on CXR. RRT 3/9 for worsening hypoxemia requiring BiPAP, transferred to Step Down.    Events last 24 hours: Today developed fulminant respiratory failure, severe ARDS, requiring emergent intubation. Received on 100% FiO2 PEEP 15. Sedated on Propofol. RASS goal deepened to improve oxygenation. Repeat ABG with improvement in respiratory acidosis and oxygenation, paO2 70 --> 82. PEEP weaned to 12.    Received Approval from [facility] to test for COVID-19. Swabs sent by . Pending result.      PAST MEDICAL & SURGICAL HISTORY:  S/P coronary artery stent placement  Hypothyroidism  Colon cancer  HLD (hyperlipidemia)  HTN (hypertension)  Dementia  No significant past surgical history      SOCIAL HISTORY: [employer]; independent and highly functional at baseline, still drives, is employed      Review of Systems: Due to intubation, subjective information was not able to be obtained from the patient. History was obtained, to the extent possible, from review of the chart and collateral sources of information.      Medications:  cefepime  Injectable. 2000 milliGRAM(s) IV Push every 12 hours  vancomycin  IVPB      phenylephrine    Infusion 0.1 MICROgram(s)/kG/Min IV Continuous <Continuous>  ALBUTerol    90 MICROgram(s) HFA Inhaler 1 Puff(s) Inhalation every 4 hours PRN  acetaminophen  Suppository .. 650 milliGRAM(s) Rectal every 6 hours PRN  propofol Infusion 30 MICROgram(s)/kG/Min IV Continuous <Continuous>  aspirin  chewable 81 milliGRAM(s) Oral daily  heparin  Injectable 5000 Unit(s) SubCutaneous three times a day  pantoprazole  Injectable 40 milliGRAM(s) IV Push daily  levothyroxine Injectable 25 MICROGram(s) IV Push at bedtime  methylPREDNISolone sodium succinate Injectable 40 milliGRAM(s) IV Push every 8 hours  lactated ringers. 1000 milliLiter(s) IV Continuous <Continuous>  chlorhexidine 0.12% Liquid 15 milliLiter(s) Oral Mucosa every 12 hours      Mode: AC/ CMV (Assist Control/ Continuous Mandatory Ventilation)  RR (machine): 16  TV (machine): 550  FiO2: 100  PEEP: 15  ITime: 1  PIP: 34      ICU Vital Signs Last 24 Hrs  T(C): 37.1 (10 Mar 2020 20:00), Max: 39.3 (10 Mar 2020 00:00)  T(F): 98.8 (10 Mar 2020 20:00), Max: 102.8 (10 Mar 2020 00:00)  HR: 60 (10 Mar 2020 21:00) (59 - 105)  BP: 93/38 (10 Mar 2020 21:00) (64/39 - 175/63)  BP(mean): 51 (10 Mar 2020 21:00) (46 - 119)  ABP: 85/40 (10 Mar 2020 21:00) (69/36 - 188/68)  ABP(mean): 55 (10 Mar 2020 21:00) (45 - 110)  RR: 23 (10 Mar 2020 21:00) (19 - 33)  SpO2: 100% (10 Mar 2020 21:00) (84% - 100%)      ABG - ( 10 Mar 2020 21:56 )  pH, Arterial: 7.38  pH, Blood: x     /  pCO2: 37    /  pO2: 82    / HCO3: 21    / Base Excess: -2.9  /  SaO2: 94                  I&O's Detail    09 Mar 2020 07:01  -  10 Mar 2020 07:00  --------------------------------------------------------  IN:    IV PiggyBack: 200 mL  Total IN: 200 mL    OUT:  Total OUT: 0 mL    Total NET: 200 mL      10 Mar 2020 07:01  -  10 Mar 2020 22:38  --------------------------------------------------------  IN:    IV PiggyBack: 250 mL    lactated ringers.: 105 mL    propofol Infusion: 97 mL  Total IN: 452 mL    OUT:    Intermittent Catheterization - Urethral: 600 mL  Total OUT: 600 mL    Total NET: -148 mL            LABS:                        13.1   9.14  )-----------( 107      ( 10 Mar 2020 08:03 )             39.3     03-10    137  |  104  |  28<H>  ----------------------------<  92  4.0   |  22  |  1.42<H>    Ca    8.8      10 Mar 2020 08:03    TPro  7.5  /  Alb  2.9<L>  /  TBili  0.8  /  DBili  x   /  AST  146<H>  /  ALT  67  /  AlkPhos  40  03-10          CAPILLARY BLOOD GLUCOSE      POCT Blood Glucose.: 118 mg/dL (09 Mar 2020 19:57)        CULTURES:  Rapid RVP Result: NotDetec (03-09 @ 15:45)  Culture Results:   No growth at 5 days. (03-04 @ 13:40)  Culture Results:   No growth at 5 days. (03-04 @ 11:33)        Physical Examination:    General: No acute distress.      HEENT: Pupils equal, reactive to light.  Symmetric.    PULM: Diffuse ronchi    CVS: Regular rate and rhythm    ABD: Soft, nondistended, nontender    EXT: No edema, nontender    SKIN: Warm and well perfused    NEURO: RASS -4        RADIOLOGY: < from: Xray Chest 1 View- PORTABLE-Urgent (03.10.20 @ 15:57) >    EXAM:  XR CHEST PORTABLE URGENT 1V                            PROCEDURE DATE:  03/10/2020      INTERPRETATION:  History: Pneumonia    Portable radiograph of the chest is performed. comparison made to 3/9/2020.    ET tube has placed with the tipapproximately 1.5 cm above the wilian. Bilateral infiltrates are seen likely similar to the prior study. There is no pleural effusion. Osseous structures are unremarkable.    Impression: Status post intubation with ET tube tube tip 1.5 cm above the wilian. Bilateral infiltrates again noted.    ROBERT STEWART M.D.,ATTENDING RADIOLOGIST  This document has been electronically signed. Mar 10 2020  4:02PM         INVASIVE LINES: Radial A line   INDWELLING GONCALVES:  VTE PROPHYLAXIS: Heparin SC   CAM ICU: N/A        CRITICAL CARE TIME SPENT: 48 minutes spent performing frequent bedside reassessments and augmenting plan of care to address problems of acute critical illness that pose high probability of life threatening deterioration and/or end organ damage/dysfunction, non-inclusive of time spent on procedures performed. Patient is a 89y old  Male who presents with a chief complaint of Acute hypoxic respiratory failure 2/2 CAP (10 Mar 2020 18:28)      BRIEF HOSPITAL COURSE: 89 yo male, PMHx HTN, HLD, CAD s/p PCI, hypothyroid, who presented from home with lethargy, cough, shortness of breath, fevers on 3/2. Patient has undergone extensive ID workup, which has thus far been negative. Labs negative for lactate, PCT, or leukocytosis. Notable for CRAIG and transaminitis. CT chest/abd/pelv only revealing of RML/RLL atelectasis. RVP has been negative. Initial BCx positive for Coag negative Staph in 1 bottle, which was likely a contaminant. Thus far, repeat bcx have all since been negative. UCx negative. Rheumatology was consulted, and workup has not identified a source of fever. Patient has remained febrile with Tmax ranging 102-104'F throughout hospital stay. His hospital course has been prolonged by worsening hypoxemia, developing bibasilar infiltrates on CXR. RRT 3/9 for worsening hypoxemia requiring BiPAP, transferred to Step Down.    Events last 24 hours: Today developed fulminant respiratory failure, severe ARDS, requiring emergent intubation. Received on 100% FiO2 PEEP 15. Sedated on Propofol. RASS goal deepened to improve oxygenation. Repeat ABG with improvement in respiratory acidosis and oxygenation, paO2 70 --> 82. PEEP weaned to 12.    Received Approval from [facility] to test for COVID-19.      PAST MEDICAL & SURGICAL HISTORY:  S/P coronary artery stent placement  Hypothyroidism  Colon cancer  HLD (hyperlipidemia)  HTN (hypertension)  Dementia  No significant past surgical history      SOCIAL HISTORY: [employer]; independent and highly functional at baseline, still drives, is employed      Review of Systems: Due to intubation, subjective information was not able to be obtained from the patient. History was obtained, to the extent possible, from review of the chart and collateral sources of information.      Medications:  cefepime  Injectable. 2000 milliGRAM(s) IV Push every 12 hours  vancomycin  IVPB      phenylephrine    Infusion 0.1 MICROgram(s)/kG/Min IV Continuous <Continuous>  ALBUTerol    90 MICROgram(s) HFA Inhaler 1 Puff(s) Inhalation every 4 hours PRN  acetaminophen  Suppository .. 650 milliGRAM(s) Rectal every 6 hours PRN  propofol Infusion 30 MICROgram(s)/kG/Min IV Continuous <Continuous>  aspirin  chewable 81 milliGRAM(s) Oral daily  heparin  Injectable 5000 Unit(s) SubCutaneous three times a day  pantoprazole  Injectable 40 milliGRAM(s) IV Push daily  levothyroxine Injectable 25 MICROGram(s) IV Push at bedtime  methylPREDNISolone sodium succinate Injectable 40 milliGRAM(s) IV Push every 8 hours  lactated ringers. 1000 milliLiter(s) IV Continuous <Continuous>  chlorhexidine 0.12% Liquid 15 milliLiter(s) Oral Mucosa every 12 hours      Mode: AC/ CMV (Assist Control/ Continuous Mandatory Ventilation)  RR (machine): 16  TV (machine): 550  FiO2: 100  PEEP: 15  ITime: 1  PIP: 34      ICU Vital Signs Last 24 Hrs  T(C): 37.1 (10 Mar 2020 20:00), Max: 39.3 (10 Mar 2020 00:00)  T(F): 98.8 (10 Mar 2020 20:00), Max: 102.8 (10 Mar 2020 00:00)  HR: 60 (10 Mar 2020 21:00) (59 - 105)  BP: 93/38 (10 Mar 2020 21:00) (64/39 - 175/63)  BP(mean): 51 (10 Mar 2020 21:00) (46 - 119)  ABP: 85/40 (10 Mar 2020 21:00) (69/36 - 188/68)  ABP(mean): 55 (10 Mar 2020 21:00) (45 - 110)  RR: 23 (10 Mar 2020 21:00) (19 - 33)  SpO2: 100% (10 Mar 2020 21:00) (84% - 100%)      ABG - ( 10 Mar 2020 21:56 )  pH, Arterial: 7.38  pH, Blood: x     /  pCO2: 37    /  pO2: 82    / HCO3: 21    / Base Excess: -2.9  /  SaO2: 94                  I&O's Detail    09 Mar 2020 07:01  -  10 Mar 2020 07:00  --------------------------------------------------------  IN:    IV PiggyBack: 200 mL  Total IN: 200 mL    OUT:  Total OUT: 0 mL    Total NET: 200 mL      10 Mar 2020 07:01  -  10 Mar 2020 22:38  --------------------------------------------------------  IN:    IV PiggyBack: 250 mL    lactated ringers.: 105 mL    propofol Infusion: 97 mL  Total IN: 452 mL    OUT:    Intermittent Catheterization - Urethral: 600 mL  Total OUT: 600 mL    Total NET: -148 mL            LABS:                        13.1   9.14  )-----------( 107      ( 10 Mar 2020 08:03 )             39.3     03-10    137  |  104  |  28<H>  ----------------------------<  92  4.0   |  22  |  1.42<H>    Ca    8.8      10 Mar 2020 08:03    TPro  7.5  /  Alb  2.9<L>  /  TBili  0.8  /  DBili  x   /  AST  146<H>  /  ALT  67  /  AlkPhos  40  03-10          CAPILLARY BLOOD GLUCOSE      POCT Blood Glucose.: 118 mg/dL (09 Mar 2020 19:57)        CULTURES:  Rapid RVP Result: NotDetec (03-09 @ 15:45)  Culture Results:   No growth at 5 days. (03-04 @ 13:40)  Culture Results:   No growth at 5 days. (03-04 @ 11:33)        Physical Examination:    General: No acute distress.      HEENT: Pupils equal, reactive to light.  Symmetric.    PULM: Diffuse ronchi    CVS: Regular rate and rhythm    ABD: Soft, nondistended, nontender    EXT: No edema, nontender    SKIN: Warm and well perfused    NEURO: RASS -4        RADIOLOGY: < from: Xray Chest 1 View- PORTABLE-Urgent (03.10.20 @ 15:57) >    EXAM:  XR CHEST PORTABLE URGENT 1V                            PROCEDURE DATE:  03/10/2020      INTERPRETATION:  History: Pneumonia    Portable radiograph of the chest is performed. comparison made to 3/9/2020.    ET tube has placed with the tipapproximately 1.5 cm above the wilian. Bilateral infiltrates are seen likely similar to the prior study. There is no pleural effusion. Osseous structures are unremarkable.    Impression: Status post intubation with ET tube tube tip 1.5 cm above the wilian. Bilateral infiltrates again noted.    ROBERT STEWART M.D.,ATTENDING RADIOLOGIST  This document has been electronically signed. Mar 10 2020  4:02PM         INVASIVE LINES: Radial A line   INDWELLING GONCALVES:  VTE PROPHYLAXIS: Heparin SC   CAM ICU: N/A        CRITICAL CARE TIME SPENT: 52 minutes spent performing frequent bedside reassessments and augmenting plan of care to address problems of acute critical illness that pose high probability of life threatening deterioration and/or end organ damage/dysfunction, non-inclusive of time spent on procedures performed.

## 2020-03-10 NOTE — PROVIDER CONTACT NOTE (OTHER) - SITUATION
Spoke to Dr. Bo  @ 2770 / Dr. Bower @ 6209  to discuss temps and by on Bipap with FIO2 at 100 with O2 sat  @ 90-96%.  Reassessed  temp see VS flowsheet.  Recalled Dr. Bo @ 0300 regarding temp

## 2020-03-10 NOTE — PROVIDER CONTACT NOTE (OTHER) - SITUATION
spoke with Margarita in office to inform  that patient is in [facility].  Please fax discharge papers to [phone].

## 2020-03-10 NOTE — PROGRESS NOTE ADULT - ASSESSMENT
A/P: 89 male with acute Hypoxic respiratory failure of unclear etiology.      Plan:  ICU    PULM: CXR reviewed and with B/L infiltrates, no sputum production, continue cefepime and vanco, Legionella and strep urine PND.  Try to send sputum.  D/W ID to R/O COVID-19.  In the process of calling the DEPT of health.  Patient in isolation room.  Infection control notified as well    Cardio: Patient became hypotensive after Sedation.  Will start Arnoldo.      Renal:  CRAIG likely from [facility].  Start LR at 100    GI: Can start feeds tonight after OGT placed and verified, PPI to IV    ENDO: Synthroid--Change to IV    RHEUM: has been on prednisone.  Change to solumedrol 40 q8    ID: Above D/W ID    SQH DVT prophylaxis    D/W Hospitalist, ID, Daughter    Patient a DNR not DNI

## 2020-03-11 LAB
ALBUMIN SERPL ELPH-MCNC: 2.3 G/DL — LOW (ref 3.3–5)
ALP SERPL-CCNC: 43 U/L — SIGNIFICANT CHANGE UP (ref 40–120)
ALT FLD-CCNC: 67 U/L — SIGNIFICANT CHANGE UP (ref 12–78)
ANION GAP SERPL CALC-SCNC: 11 MMOL/L — SIGNIFICANT CHANGE UP (ref 5–17)
AST SERPL-CCNC: 158 U/L — HIGH (ref 15–37)
BASE EXCESS BLDA CALC-SCNC: -4.4 MMOL/L — LOW (ref -2–2)
BASOPHILS # BLD AUTO: 0.01 K/UL — SIGNIFICANT CHANGE UP (ref 0–0.2)
BASOPHILS NFR BLD AUTO: 0.1 % — SIGNIFICANT CHANGE UP (ref 0–2)
BILIRUB DIRECT SERPL-MCNC: 0.6 MG/DL — HIGH (ref 0–0.2)
BILIRUB INDIRECT FLD-MCNC: 0.3 MG/DL — SIGNIFICANT CHANGE UP (ref 0.2–1)
BILIRUB SERPL-MCNC: 0.9 MG/DL — SIGNIFICANT CHANGE UP (ref 0.2–1.2)
BLOOD GAS COMMENTS ARTERIAL: SIGNIFICANT CHANGE UP
BUN SERPL-MCNC: 33 MG/DL — HIGH (ref 7–23)
CALCIUM SERPL-MCNC: 8.4 MG/DL — LOW (ref 8.5–10.1)
CHLORIDE SERPL-SCNC: 107 MMOL/L — SIGNIFICANT CHANGE UP (ref 96–108)
CK SERPL-CCNC: 1866 U/L — HIGH (ref 26–308)
CO2 SERPL-SCNC: 22 MMOL/L — SIGNIFICANT CHANGE UP (ref 22–31)
CREAT SERPL-MCNC: 1.33 MG/DL — HIGH (ref 0.5–1.3)
CRP SERPL-MCNC: 34.61 MG/DL — HIGH (ref 0–0.4)
EOSINOPHIL # BLD AUTO: 0 K/UL — SIGNIFICANT CHANGE UP (ref 0–0.5)
EOSINOPHIL NFR BLD AUTO: 0 % — SIGNIFICANT CHANGE UP (ref 0–6)
GAS PNL BLDA: SIGNIFICANT CHANGE UP
GLUCOSE SERPL-MCNC: 147 MG/DL — HIGH (ref 70–99)
HCO3 BLDA-SCNC: 21 MMOL/L — SIGNIFICANT CHANGE UP (ref 21–29)
HCT VFR BLD CALC: 39.6 % — SIGNIFICANT CHANGE UP (ref 39–50)
HGB BLD-MCNC: 12.6 G/DL — LOW (ref 13–17)
IMM GRANULOCYTES NFR BLD AUTO: 1.6 % — HIGH (ref 0–1.5)
LDH SERPL L TO P-CCNC: 506 U/L — HIGH (ref 84–241)
LEGIONELLA AG UR QL: NEGATIVE — SIGNIFICANT CHANGE UP
LYMPHOCYTES # BLD AUTO: 0.82 K/UL — LOW (ref 1–3.3)
LYMPHOCYTES # BLD AUTO: 8.2 % — LOW (ref 13–44)
MAGNESIUM SERPL-MCNC: 2.2 MG/DL — SIGNIFICANT CHANGE UP (ref 1.6–2.6)
MCHC RBC-ENTMCNC: 28.8 PG — SIGNIFICANT CHANGE UP (ref 27–34)
MCHC RBC-ENTMCNC: 31.8 GM/DL — LOW (ref 32–36)
MCV RBC AUTO: 90.6 FL — SIGNIFICANT CHANGE UP (ref 80–100)
MONOCYTES # BLD AUTO: 0.49 K/UL — SIGNIFICANT CHANGE UP (ref 0–0.9)
MONOCYTES NFR BLD AUTO: 4.9 % — SIGNIFICANT CHANGE UP (ref 2–14)
MYOGLOBIN UR-MCNC: 1057 MCG/L — HIGH
NEUTROPHILS # BLD AUTO: 8.52 K/UL — HIGH (ref 1.8–7.4)
NEUTROPHILS NFR BLD AUTO: 85.2 % — HIGH (ref 43–77)
PCO2 BLDA: 42 MMHG — SIGNIFICANT CHANGE UP (ref 32–46)
PH BLDA: 7.32 — LOW (ref 7.35–7.45)
PHOSPHATE SERPL-MCNC: 5 MG/DL — HIGH (ref 2.5–4.5)
PLATELET # BLD AUTO: 189 K/UL — SIGNIFICANT CHANGE UP (ref 150–400)
PO2 BLDA: 85 MMHG — SIGNIFICANT CHANGE UP (ref 74–108)
POTASSIUM SERPL-MCNC: 3.9 MMOL/L — SIGNIFICANT CHANGE UP (ref 3.5–5.3)
POTASSIUM SERPL-SCNC: 3.9 MMOL/L — SIGNIFICANT CHANGE UP (ref 3.5–5.3)
PROCALCITONIN SERPL-MCNC: 1.54 NG/ML — HIGH (ref 0.02–0.1)
PROT SERPL-MCNC: 6.8 GM/DL — SIGNIFICANT CHANGE UP (ref 6–8.3)
RBC # BLD: 4.37 M/UL — SIGNIFICANT CHANGE UP (ref 4.2–5.8)
RBC # FLD: 14.8 % — HIGH (ref 10.3–14.5)
S PNEUM AG UR QL: NEGATIVE — SIGNIFICANT CHANGE UP
SAO2 % BLDA: 96 % — SIGNIFICANT CHANGE UP (ref 92–96)
SARS-COV-2 RNA SPEC QL NAA+PROBE: (no result)
SODIUM SERPL-SCNC: 140 MMOL/L — SIGNIFICANT CHANGE UP (ref 135–145)
WBC # BLD: 10 K/UL — SIGNIFICANT CHANGE UP (ref 3.8–10.5)
WBC # FLD AUTO: 10 K/UL — SIGNIFICANT CHANGE UP (ref 3.8–10.5)

## 2020-03-11 PROCEDURE — 99291 CRITICAL CARE FIRST HOUR: CPT | Mod: 25

## 2020-03-11 PROCEDURE — 71045 X-RAY EXAM CHEST 1 VIEW: CPT | Mod: 26,77

## 2020-03-11 PROCEDURE — 71045 X-RAY EXAM CHEST 1 VIEW: CPT | Mod: 26

## 2020-03-11 PROCEDURE — 99292 CRITICAL CARE ADDL 30 MIN: CPT | Mod: 25

## 2020-03-11 PROCEDURE — 31645 BRNCHSC W/THER ASPIR 1ST: CPT

## 2020-03-11 RX ORDER — IPRATROPIUM BROMIDE 0.2 MG/ML
1 SOLUTION, NON-ORAL INHALATION EVERY 6 HOURS
Refills: 0 | Status: DISCONTINUED | OUTPATIENT
Start: 2020-03-11 | End: 2020-03-16

## 2020-03-11 RX ORDER — THIAMINE MONONITRATE (VIT B1) 100 MG
200 TABLET ORAL
Refills: 0 | Status: DISCONTINUED | OUTPATIENT
Start: 2020-03-11 | End: 2020-03-13

## 2020-03-11 RX ORDER — CEFEPIME 1 G/1
2000 INJECTION, POWDER, FOR SOLUTION INTRAMUSCULAR; INTRAVENOUS EVERY 12 HOURS
Refills: 0 | Status: DISCONTINUED | OUTPATIENT
Start: 2020-03-11 | End: 2020-03-15

## 2020-03-11 RX ORDER — ALBUTEROL 90 UG/1
2 AEROSOL, METERED ORAL EVERY 6 HOURS
Refills: 0 | Status: DISCONTINUED | OUTPATIENT
Start: 2020-03-11 | End: 2020-03-16

## 2020-03-11 RX ORDER — ASCORBIC ACID 60 MG
1500 TABLET,CHEWABLE ORAL EVERY 6 HOURS
Refills: 0 | Status: DISCONTINUED | OUTPATIENT
Start: 2020-03-11 | End: 2020-03-13

## 2020-03-11 RX ORDER — AZITHROMYCIN 500 MG/1
500 TABLET, FILM COATED ORAL EVERY 24 HOURS
Refills: 0 | Status: DISCONTINUED | OUTPATIENT
Start: 2020-03-11 | End: 2020-03-15

## 2020-03-11 RX ADMIN — PROPOFOL 18 MICROGRAM(S)/KG/MIN: 10 INJECTION, EMULSION INTRAVENOUS at 01:25

## 2020-03-11 RX ADMIN — HEPARIN SODIUM 5000 UNIT(S): 5000 INJECTION INTRAVENOUS; SUBCUTANEOUS at 21:36

## 2020-03-11 RX ADMIN — Medication 1 PUFF(S): at 15:41

## 2020-03-11 RX ADMIN — HEPARIN SODIUM 5000 UNIT(S): 5000 INJECTION INTRAVENOUS; SUBCUTANEOUS at 15:12

## 2020-03-11 RX ADMIN — Medication 40 MILLIGRAM(S): at 14:00

## 2020-03-11 RX ADMIN — PROPOFOL 18 MICROGRAM(S)/KG/MIN: 10 INJECTION, EMULSION INTRAVENOUS at 21:36

## 2020-03-11 RX ADMIN — CEFEPIME 100 MILLIGRAM(S): 1 INJECTION, POWDER, FOR SOLUTION INTRAMUSCULAR; INTRAVENOUS at 21:37

## 2020-03-11 RX ADMIN — Medication 40 MILLIGRAM(S): at 06:00

## 2020-03-11 RX ADMIN — SODIUM CHLORIDE 100 MILLILITER(S): 9 INJECTION, SOLUTION INTRAVENOUS at 17:47

## 2020-03-11 RX ADMIN — CHLORHEXIDINE GLUCONATE 15 MILLILITER(S): 213 SOLUTION TOPICAL at 06:00

## 2020-03-11 RX ADMIN — CHLORHEXIDINE GLUCONATE 15 MILLILITER(S): 213 SOLUTION TOPICAL at 17:45

## 2020-03-11 RX ADMIN — Medication 650 MILLIGRAM(S): at 02:49

## 2020-03-11 RX ADMIN — PROPOFOL 18 MICROGRAM(S)/KG/MIN: 10 INJECTION, EMULSION INTRAVENOUS at 06:00

## 2020-03-11 RX ADMIN — PHENYLEPHRINE HYDROCHLORIDE 3.74 MICROGRAM(S)/KG/MIN: 10 INJECTION INTRAVENOUS at 09:44

## 2020-03-11 RX ADMIN — CEFEPIME 2000 MILLIGRAM(S): 1 INJECTION, POWDER, FOR SOLUTION INTRAMUSCULAR; INTRAVENOUS at 06:01

## 2020-03-11 RX ADMIN — SODIUM CHLORIDE 100 MILLILITER(S): 9 INJECTION, SOLUTION INTRAVENOUS at 02:50

## 2020-03-11 RX ADMIN — PROPOFOL 18 MICROGRAM(S)/KG/MIN: 10 INJECTION, EMULSION INTRAVENOUS at 11:19

## 2020-03-11 RX ADMIN — ALBUTEROL 2 PUFF(S): 90 AEROSOL, METERED ORAL at 15:41

## 2020-03-11 RX ADMIN — PANTOPRAZOLE SODIUM 40 MILLIGRAM(S): 20 TABLET, DELAYED RELEASE ORAL at 11:24

## 2020-03-11 RX ADMIN — Medication 166.67 MILLIGRAM(S): at 17:44

## 2020-03-11 RX ADMIN — Medication 81 MILLIGRAM(S): at 15:11

## 2020-03-11 RX ADMIN — Medication 40 MILLIGRAM(S): at 21:39

## 2020-03-11 RX ADMIN — SODIUM CHLORIDE 100 MILLILITER(S): 9 INJECTION, SOLUTION INTRAVENOUS at 11:24

## 2020-03-11 RX ADMIN — Medication 650 MILLIGRAM(S): at 01:26

## 2020-03-11 RX ADMIN — Medication 25 MICROGRAM(S): at 21:36

## 2020-03-11 RX ADMIN — AZITHROMYCIN 255 MILLIGRAM(S): 500 TABLET, FILM COATED ORAL at 23:55

## 2020-03-11 RX ADMIN — HEPARIN SODIUM 5000 UNIT(S): 5000 INJECTION INTRAVENOUS; SUBCUTANEOUS at 06:00

## 2020-03-11 RX ADMIN — PHENYLEPHRINE HYDROCHLORIDE 3.74 MICROGRAM(S)/KG/MIN: 10 INJECTION INTRAVENOUS at 17:45

## 2020-03-11 RX ADMIN — SODIUM CHLORIDE 100 MILLILITER(S): 9 INJECTION, SOLUTION INTRAVENOUS at 21:37

## 2020-03-11 RX ADMIN — PROPOFOL 18 MICROGRAM(S)/KG/MIN: 10 INJECTION, EMULSION INTRAVENOUS at 12:51

## 2020-03-11 RX ADMIN — PHENYLEPHRINE HYDROCHLORIDE 3.74 MICROGRAM(S)/KG/MIN: 10 INJECTION INTRAVENOUS at 01:28

## 2020-03-11 NOTE — CHART NOTE - NSCHARTNOTEFT_GEN_A_CORE
Assessment:   *pt s/p intubation with transfer to ICU.  Pt with acute hypoxic resp failure of unclear etiology.  CXR with b/l infiltrates pending further investigation.  CRAIG from [facility].   *propofol infusing @ 18mL/hr (=475Kcal/day).  RN/MD reported high LDL levels, labs not visible in EMR, unclear what actual levels are.  consider re-check of triglyceride level.    *pt has been minimally eating during admission 2/2 SOB and lethargy.  Pt meeting <50% of estimated nutr needs x 9 days.    *(+3) Rt/Lt leg edema.  BM (+) 3/5; monitor and add bowel regimen prn.    *noted wt gain of ~25Kg, possible fluid gain as pt with severe edema.  continue to check wt daily to track/trend changes    *based on propofol infusion; rec'd TF of Glucerna 1.5 starting at 20mL/hr and titrating by 10 mL/hr q6hrs to goal rate of 50mL/hr with 2pkts prosource TF.  Which will provide ~1580Kcal, 105g protein, and 759mL free water.  Combined with propofol, pt will meet 100% of estimated nutr needs.  Pt with IVF, if stopped, consider starting free water flushes.     Recommendations:  1) Glucerna 1.5 starting at 20mL/hr and titrating by 10 mL/hr q6hrs to goal rate of 50mL/hr with 2pkts prosource TF  2) monitor TF tolerance, keep back of bed >35 degrees  3) monitor hydration status; if IVF stopped, consider starting free water flushes.  4) daily wt checks  5) monitor lytes/mins; replete/correct prn       Diet Prescription: NPO    Wt Hx:  124.6Kg (3/10/20)  Weight (kg): 99.8 (03-02-20 @ 17:35)      03-10-20 @ 07:01  -  03-11-20 @ 07:00  --------------------------------------------------------  IN: 452 mL / OUT: 1600 mL / NET: -1148 mL        Estimated Needs:   based on wt: 82Kg (IBW for protein)     Estimated Energy Needs (25-30 calories/kg):  · From (25cal/kg)	2040  To (30cal/kg)	2448      Other Calculation:  · Other Calculation	Ht. 70   "      Wt. 220   # (100Kg)      BMI  31.5          #      Pt is at  133 %  IBW #     Estimated Protein Needs (1.4-1.8 gm/kg):  · From (1.4 g/kg)	105  To (1.8 g/kg)	135     Estimated Fluid Needs (25-30 ml/kg):  · From (25 ml/kg)	2040  To (30 ml/kg)	2448         Pertinent Labs: 03-10 Na137 mmol/L Glu 92 mg/dL K+ 4.0 mmol/L Cr  1.42 mg/dL<H> BUN 28 mg/dL<H> 03-10 Alb 2.9 g/dL<L>       Skin: cristin score = 12  no PU noted    Monitoring and Evaluation:   [x] PO intake/Nutr support infusion [ x ] Tolerance to Nutr [ x ] weights [ x ] labs[ x ] follow up per protocol  [ ] other: Assessment:   *pt s/p intubation with transfer to ICU.  Pt with acute hypoxic resp failure of unclear etiology.  CXR with b/l infiltrates pending further investigation.  CRAIG from [facility].   *propofol infusing @ 18mL/hr (=475Kcal/day).  RN/MD reported high LDL levels, labs not visible in EMR, unclear what actual levels are.  consider re-check of triglyceride level.    *pt has been minimally eating during admission 2/2 SOB and lethargy.  Pt meeting <50% of estimated nutr needs x 9 days.    *(+3) Rt/Lt leg edema.  BM (+) 3/5; monitor and add bowel regimen prn.    *noted wt gain of ~25Kg, possible fluid gain as pt with severe edema.  continue to check wt daily to track/trend changes    *based on propofol infusion; rec'd TF of Glucerna 1.5 starting at 20mL/hr and titrating by 10 mL/hr q6hrs to goal rate of 50mL/hr with 2pkts prosource TF.  Which will provide ~1580Kcal, 105g protein, and 759mL free water.  Combined with propofol, pt will meet 100% of estimated nutr needs.  Pt with IVF, if stopped, consider starting free water flushes.    *pt now meeting criteria for severe malnutrition in acute on chronic illness r/t inability to consume sufficient energy/protein 2/2 SOB AEB meeting <50% of ENN x 9 days and severe edema     Recommendations:  1) Glucerna 1.5 starting at 20mL/hr and titrating by 10 mL/hr q6hrs to goal rate of 50mL/hr with 2pkts prosource TF  2) monitor TF tolerance, keep back of bed >35 degrees  3) monitor hydration status; if IVF stopped, consider starting free water flushes.  4) daily wt checks  5) monitor lytes/mins; replete/correct prn       Diet Prescription: NPO    Wt Hx:  124.6Kg (3/10/20)  Weight (kg): 99.8 (03-02-20 @ 17:35)      03-10-20 @ 07:01  -  03-11-20 @ 07:00  --------------------------------------------------------  IN: 452 mL / OUT: 1600 mL / NET: -1148 mL        Estimated Needs:   based on wt: 82Kg (IBW for protein)     Estimated Energy Needs (25-30 calories/kg):  · From (25cal/kg)	2040  To (30cal/kg)	2448      Other Calculation:  · Other Calculation	Ht. 70   "      Wt. 220   # (100Kg)      BMI  31.5          #      Pt is at  133 %  IBW #     Estimated Protein Needs (1.4-1.8 gm/kg):  · From (1.4 g/kg)	105  To (1.8 g/kg)	135     Estimated Fluid Needs (25-30 ml/kg):  · From (25 ml/kg)	2040  To (30 ml/kg)	2448         Pertinent Labs: 03-10 Na137 mmol/L Glu 92 mg/dL K+ 4.0 mmol/L Cr  1.42 mg/dL<H> BUN 28 mg/dL<H> 03-10 Alb 2.9 g/dL<L>       Skin: cristin score = 12  no PU noted    Monitoring and Evaluation:   [x] PO intake/Nutr support infusion [ x ] Tolerance to Nutr [ x ] weights [ x ] labs[ x ] follow up per protocol  [ ] other:

## 2020-03-11 NOTE — PROGRESS NOTE ADULT - ASSESSMENT
89 yo M with Hx of HTN, HLD, CAD s/p cardiac stent placement, colon ca s/p resection, hypothyroidism presents with 3 day history of progressively worsening weakness and lethargy with associated dry cough. She notes patient generally sleeps alot during the day and easily falls asleep but notes that he was very lethargic requiring more effort to be aroused.  Daughter describes pt having a sick appearance. Pt has had decreased appetite. Pt has 2 recent sick contacts, son had flu and family co-worker was very ill recently. He is constipated intermittently, for which he has taken milk of magnesia. Denies any recent hospitalizations. Pt last travelled in Jan 2020 to [place] and has plans to travel there this week. In the ED pt was hypoxic to 89% on RA. Pt was given 3.1 L bolus of LR and IV ceftriaxone/azithromycin. Noted initially febrile 102, nl wbc ct, CT chest/abd/pelvis RML-RLL atelectasis, lingular pulm nodule.     1. Acute hypoxic respiratory failure. Febrile syndrome. Pneumonia. CRF stage 3. PMR. Immunocompromised host.   - remains intubated, on pressors, xray with b/l lung infiltrates  - legionella ag (-), f/u strep urine ag, check sputum cx for fungal orgs/legionella  - s/p bronchoscopy f/u BAL cx  - COVID-19 PCR testing pending from 3/10  - on contact/airborne precautions for now  - on IV vancomycin [id number] #2 check trough prior to 4th dose  - on IV cefepime [id number] #3  - blood cx/urine cx/RVP all unremarkable  - elevated CRP, ferritin/CPK f/u LDH  - rheumatology eval noted f/u MRI femur r/o myositis   - when able, suggest CT chest  - tolerating abx well so far; no side effects noted  - reason for abx use and side effects reviewed with patient  - supportive care  - fu cbc    2. other issues - care per medicine       - may require intubation/tx to ICU  - on IV cefepime [id number]  - start vancomycin [id number] check trough prior to 4th dose   - RVP 3/9, 3/2 (-) f/u legionella ag/strep urine ag   - check sputum cx   - CK/ferritin elevated, plan for MRI femur when able r/o myositis   - reason for abx use and side effects reviewed with patient; monitor BMP   - rheumatology evaluation appreciated  - respiratory care  - s/p ceftriaxone and azithro several days ago  - TTE reviewed, no vegetations   - monitor temps  - f/u CBC  - supportive care    2. Other issues:   -care per medicine 89 yo M with Hx of HTN, HLD, CAD s/p cardiac stent placement, colon ca s/p resection, hypothyroidism presents with 3 day history of progressively worsening weakness and lethargy with associated dry cough. She notes patient generally sleeps alot during the day and easily falls asleep but notes that he was very lethargic requiring more effort to be aroused.  Daughter describes pt having a sick appearance. Pt has had decreased appetite. Pt has 2 recent sick contacts, son had flu and family co-worker was very ill recently. He is constipated intermittently, for which he has taken milk of magnesia. Denies any recent hospitalizations. Pt last travelled in Jan 2020 to [place] and has plans to travel there this week. In the ED pt was hypoxic to 89% on RA. Pt was given 3.1 L bolus of LR and IV ceftriaxone/azithromycin. Noted initially febrile 102, nl wbc ct, CT chest/abd/pelvis RML-RLL atelectasis, lingular pulm nodule.     1. Acute hypoxic respiratory failure. Febrile syndrome. Pneumonia. CRF stage 3. PMR. Immunocompromised host.   - remains intubated, on pressors, xray with b/l lung infiltrates  - legionella ag (-), f/u strep urine ag, check sputum cx for fungal orgs/legionella  - s/p bronchoscopy f/u BAL cx  - COVID-19 PCR testing pending from 3/10  - on contact/airborne precautions for now  - on IV vancomycin [id number] #2 check trough prior to 4th dose  - on IV cefepime [id number] #3  - blood cx/urine cx/RVP all unremarkable  - elevated CRP, ferritin/CPK, AST, f/u LDH  - rheumatology eval noted f/u MRI femur r/o myositis   - when able, suggest CT chest  - tolerating abx well so far; no side effects noted  - reason for abx use and side effects reviewed with patient  - supportive care  - fu cbc    2. other issues - care per medicine       - may require intubation/tx to ICU  - on IV cefepime [id number]  - start vancomycin [id number] check trough prior to 4th dose   - RVP 3/9, 3/2 (-) f/u legionella ag/strep urine ag   - check sputum cx   - CK/ferritin elevated, plan for MRI femur when able r/o myositis   - reason for abx use and side effects reviewed with patient; monitor BMP   - rheumatology evaluation appreciated  - respiratory care  - s/p ceftriaxone and azithro several days ago  - TTE reviewed, no vegetations   - monitor temps  - f/u CBC  - supportive care    2. Other issues:   -care per medicine

## 2020-03-11 NOTE — PROGRESS NOTE ADULT - SUBJECTIVE AND OBJECTIVE BOX
Called to see the patient in the SDU for Hypoxic respiratory failure    Patient is a 89 year old male who had presented with weakness and nonproductive cough.  He had fevers since admission and eventually developed B/L infiltrates and went on NIV on 3/8-3/9.  He had an extensive work up for this illness and to date it has been negative including 2 RVPs.  Legionella urine ag and strp ag are PND.  Patient was transferred to the ICU and required intubation.    Once intubated he is on 100% and 15 PEEP.      3/11: The patient remains intubated on pressors, on 80% Fio2 and 18 PEEP.  COVID-19 tests PND.           PAST MEDICAL & SURGICAL HISTORY:  S/P coronary artery stent placement  Hypothyroidism  Colon cancer  HLD (hyperlipidemia)  HTN (hypertension)  Dementia  No significant past surgical history      FAMILY HISTORY:  No pertinent family history in first degree relatives      Social Hx:    Allergies    penicillins (Unknown)    Intolerances            ICU Vital Signs Last 24 Hrs  T(C): 36 (11 Mar 2020 10:00), Max: 38.9 (10 Mar 2020 13:15)  T(F): 96.8 (11 Mar 2020 10:00), Max: 102 (10 Mar 2020 13:15)  HR: 55 (11 Mar 2020 10:00) (47 - 105)  BP: 125/53 (11 Mar 2020 08:00) (64/39 - 175/63)  BP(mean): 72 (11 Mar 2020 08:00) (46 - 119)  ABP: 124/50 (11 Mar 2020 10:00) (69/36 - 188/68)  ABP(mean): 74 (11 Mar 2020 10:00) (45 - 110)  RR: 15 (11 Mar 2020 10:00) (15 - 32)  SpO2: 97% (11 Mar 2020 10:00) (84% - 100%)      Mode: AC/ CMV (Assist Control/ Continuous Mandatory Ventilation)  RR (machine): 16  TV (machine): 550  FiO2: 80  PEEP: 10  ITime: 1  PIP: 25      I&O's Summary    10 Mar 2020 07:01  -  11 Mar 2020 07:00  --------------------------------------------------------  IN: 452 mL / OUT: 1600 mL / NET: -1148 mL                              13.1   9.14  )-----------( 107      ( 10 Mar 2020 08:03 )             39.3       03-10    137  |  104  |  28<H>  ----------------------------<  92  4.0   |  22  |  1.42<H>    Ca    8.8      10 Mar 2020 08:03    TPro  7.5  /  Alb  2.9<L>  /  TBili  0.8  /  DBili  x   /  AST  146<H>  /  ALT  67  /  AlkPhos  40  03-10            ABG - ( 11 Mar 2020 08:11 )  pH, Arterial: 7.32  pH, Blood: x     /  pCO2: 42    /  pO2: 85    / HCO3: 21    / Base Excess: -4.4  /  SaO2: 96                      MEDICATIONS  (STANDING):  ALBUTerol    90 MICROgram(s) HFA Inhaler 2 Puff(s) Inhalation every 6 hours  aspirin  chewable 81 milliGRAM(s) Oral daily  cefepime  Injectable. 2000 milliGRAM(s) IV Push every 12 hours  chlorhexidine 0.12% Liquid 15 milliLiter(s) Oral Mucosa every 12 hours  heparin  Injectable 5000 Unit(s) SubCutaneous three times a day  ipratropium 17 MICROgram(s) HFA Inhaler 1 Puff(s) Inhalation every 6 hours  lactated ringers. 1000 milliLiter(s) (100 mL/Hr) IV Continuous <Continuous>  levothyroxine Injectable 25 MICROGram(s) IV Push at bedtime  methylPREDNISolone sodium succinate Injectable 40 milliGRAM(s) IV Push every 8 hours  pantoprazole  Injectable 40 milliGRAM(s) IV Push daily  phenylephrine    Infusion 0.1 MICROgram(s)/kG/Min (3.74 mL/Hr) IV Continuous <Continuous>  propofol Infusion 30 MICROgram(s)/kG/Min (18 mL/Hr) IV Continuous <Continuous>  vancomycin  IVPB 1250 milliGRAM(s) IV Intermittent every 24 hours  vancomycin  IVPB        MEDICATIONS  (PRN):  acetaminophen  Suppository .. 650 milliGRAM(s) Rectal every 6 hours PRN Temp greater or equal to 38C (100.4F)  ALBUTerol    90 MICROgram(s) HFA Inhaler 1 Puff(s) Inhalation every 4 hours PRN Shortness of Breath and/or Wheezing      DVT Prophylaxis:    Advanced Directives:  Discussed with:    Visit Information:    ** Time is exclusive of billed procedures and/or teaching and/or routine family updates.

## 2020-03-11 NOTE — PROGRESS NOTE ADULT - SUBJECTIVE AND OBJECTIVE BOX
Covid-19+ with hypoxemic VDRF and hypotension on pressors   + inflammatory markers and lymphopenia   EF initially noted to be 60%.      D/W Dr Layne E-ICU   added azithro for inflam modulation vit c and thiamine per consensus recs at [facility].    CXR is improved but still with a significant A-A gradient.   ARDS criteria        Emperic ABX    Bronchoscopy done today.  No cultures noted.  Will d/c steroids  recc not to be given     D/W patients daughter.      PX poor     DNR.

## 2020-03-11 NOTE — PROGRESS NOTE ADULT - ASSESSMENT
A/P: 89 male with acute Hypoxic respiratory failure of unclear etiology.  Hypotension  R/O COVID-19    Plan:  ICU    PULM: CXR reviewed and with B/L infiltrates, no sputum production, continue cefepime and vanco, Legionella urine ag is neg and strep urine PND.  Bronch done for sputum samples.  COVID -19 results PND    Cardio: Hypotension likely from sedation      Renal:  CRAIG likely from [facility].  Continue LR at 100.  BMP PND    GI: Start feeds after OGT placed and verified, PPI    ENDO: Synthroid IV    RHEUM: has been on prednisone.  Changed to solumedrol 40 q8    ID: Above D/W ID    SQH DVT prophylaxis    D/W ID, Daughter    Patient a DNR not DNI

## 2020-03-11 NOTE — CHART NOTE - NSCHARTNOTEFT_GEN_A_CORE
Upon Nutritional Assessment by the Registered Dietitian your patient was determined to meet criteria / has evidence of the following diagnosis/diagnoses:          [ ]  Mild Protein Calorie Malnutrition        [ ]  Moderate Protein Calorie Malnutrition        [x] Severe Protein Calorie Malnutrition        [ ] Unspecified Protein Calorie Malnutrition        [ ] Underweight / BMI <19        [ ] Morbid Obesity / BMI > 40      Findings:.  pt now meeting criteria for severe malnutrition in acute on chronic illness r/t inability to consume sufficient energy/protein 2/2 SOB AEB meeting <50% of ENN x 9 days and severe edema    Findings as based on:  •  Comprehensive nutrition assessment and consultation  •  Calorie counts (nutrient intake analysis)  •  Food acceptance and intake status from observations by staff  •  Follow up  •  Patient education  •  Intervention secondary to interdisciplinary rounds  •   concerns      Treatment:    The following diet has been recommended:  1) Glucerna 1.5 starting at 20mL/hr and titrating by 10 mL/hr q6hrs to goal rate of 50mL/hr with 2pkts prosource TF  2) monitor TF tolerance, keep back of bed >35 degrees  3) monitor hydration status; if IVF stopped, consider starting free water flushes.  4) daily wt checks  5) monitor lytes/mins; replete/correct prn    PROVIDER Section:     By signing this assessment you are acknowledging and agree with the diagnosis/diagnoses assigned by the Registered Dietitian    Comments:

## 2020-03-11 NOTE — PROGRESS NOTE ADULT - SUBJECTIVE AND OBJECTIVE BOX
Date of service: 20 @ 12:15    Pt seen and examined  Temps variable, hypothermic  Intubated 3/10 d/t hypoxic resp failure  On low dose pressors  Minimal resp secretions   s/p bronchoscopy at bedside     ROS: unable to obtain d/t medical condition    MEDICATIONS  (STANDING):  ALBUTerol    90 MICROgram(s) HFA Inhaler 2 Puff(s) Inhalation every 6 hours  aspirin  chewable 81 milliGRAM(s) Oral daily  cefepime  Injectable. 2000 milliGRAM(s) IV Push every 12 hours  chlorhexidine 0.12% Liquid 15 milliLiter(s) Oral Mucosa every 12 hours  heparin  Injectable 5000 Unit(s) SubCutaneous three times a day  ipratropium 17 MICROgram(s) HFA Inhaler 1 Puff(s) Inhalation every 6 hours  lactated ringers. 1000 milliLiter(s) (100 mL/Hr) IV Continuous <Continuous>  levothyroxine Injectable 25 MICROGram(s) IV Push at bedtime  methylPREDNISolone sodium succinate Injectable 40 milliGRAM(s) IV Push every 8 hours  pantoprazole  Injectable 40 milliGRAM(s) IV Push daily  phenylephrine    Infusion 0.1 MICROgram(s)/kG/Min (3.74 mL/Hr) IV Continuous <Continuous>  propofol Infusion 30 MICROgram(s)/kG/Min (18 mL/Hr) IV Continuous <Continuous>  vancomycin  IVPB 1250 milliGRAM(s) IV Intermittent every 24 hours  vancomycin  IVPB          Vital Signs Last 24 Hrs  T(C): 36 (11 Mar 2020 10:00), Max: 38.9 (10 Mar 2020 13:15)  T(F): 96.8 (11 Mar 2020 10:00), Max: 102 (10 Mar 2020 13:15)  HR: 60 (11 Mar 2020 11:49) (47 - 105)  BP: 125/53 (11 Mar 2020 08:00) (64/39 - 175/63)  BP(mean): 72 (11 Mar 2020 08:00) (46 - 92)  RR: 15 (11 Mar 2020 10:00) (15 - 32)  SpO2: 97% (11 Mar 2020 11:49) (87% - 100%)      Physical Exam:  Constitutional: frail looking; intubated  HEENT: NC/AT, EOMI, PERRLA, conjunctivae clear; ears and nose atraumatic; pharynx benign  Neck: supple; thyroid not palpable  Back: no tenderness  Respiratory: crackles at bases  Cardiovascular: S1S2 regular, no murmurs  Abdomen: soft, not tender, not distended, positive BS; liver and spleen WNL  Genitourinary: no suprapubic tenderness  Lymphatic: no LN palpable  Musculoskeletal: no muscle tenderness, no joint swelling or tenderness  Extremities: no pedal edema  Neurological/ Psychiatric: intubated, no focal deficits   Skin: no rashes; no palpable lesions    Labs: all available labs reviewed                                            12.6   10.00 )-----------( 189      ( 11 Mar 2020 11:35 )             39.6     03-10    137  |  104  |  28<H>  ----------------------------<  92  4.0   |  22  |  1.42<H>    Ca    8.8      10 Mar 2020 08:03    TPro  7.5  /  Alb  2.9<L>  /  TBili  0.8  /  DBili  x   /  AST  146<H>  /  ALT  67  /  AlkPhos  40  03-10        Color: Yellow / Appearance: Clear / S.010 / pH: x  Gluc: x / Ketone: Negative  / Bili: Negative / Urobili: Negative mg/dL   Blood: x / Protein: 15 mg/dL / Nitrite: Negative   Leuk Esterase: Trace / RBC: 3-5 /HPF / WBC 0-2   Sq Epi: x / Non Sq Epi: Occasional / Bacteria: Negative      Culture - Blood (collected 04 Mar 2020 13:40)  Source: .Blood None  Preliminary Report (05 Mar 2020 19:03):    No growth to date.    Culture - Blood (collected 04 Mar 2020 11:33)  Source: .Blood None  Preliminary Report (05 Mar 2020 17:02):    No growth to date.    Culture - Urine (collected 02 Mar 2020 19:34)  Source: .Urine None  Final Report (04 Mar 2020 06:57):    >=3 organisms. Probable collection contamination.    Culture - Blood (collected 02 Mar 2020 17:53)  Source: .Blood Blood-Peripheral  Gram Stain (04 Mar 2020 04:43):    Growth in aerobic bottle:    Gram positive cocci in pairs  Final Report (05 Mar 2020 12:14):    Growth in aerobic bottle: Coag Negative Staphylococcus      -  Coagulase negative Staphylococcus: Detec      Method Type: PCR    Culture - Blood (collected 02 Mar 2020 17:53)  Source: .Blood Blood-Peripheral  Final Report (08 Mar 2020 01:00):    No growth at 5 days.        Radiology: all available radiological tests reviewed  < from: Xray Chest 1 View- PORTABLE-Urgent (03.10.20 @ 15:57) >  EXAM:  XR CHEST PORTABLE URGENT 1V                            PROCEDURE DATE:  03/10/2020          INTERPRETATION:  History: Pneumonia    Portable radiograph of the chest is performed. comparison made to 3/9/2020.    ET tube has placed with the tipapproximately 1.5 cm above the wilian. Bilateral infiltrates are seen likely similar to the prior study. There is no pleural effusion. Osseous structures are unremarkable.    Impression: Status post intubation with ET tube tube tip 1.5 cm above the wilian. Bilateral infiltrates again noted.        < from: Xray Chest 1 View- PORTABLE-Urgent (20 @ 13:58) >  LEFT greater than RIGHT bilateral perihilar basilar airspace consolidations concerning for infectious pneumonia..    < end of copied text >      Advanced directives addressed: full resuscitation Date of service: 20 @ 12:15    Pt seen and examined  Temps variable, hypothermic  Intubated 3/10 d/t hypoxic resp failure  On low dose pressors for hypotension  Minimal resp secretions   s/p bronchoscopy at bedside     ROS: unable to obtain d/t medical condition    MEDICATIONS  (STANDING):  ALBUTerol    90 MICROgram(s) HFA Inhaler 2 Puff(s) Inhalation every 6 hours  aspirin  chewable 81 milliGRAM(s) Oral daily  cefepime  Injectable. 2000 milliGRAM(s) IV Push every 12 hours  chlorhexidine 0.12% Liquid 15 milliLiter(s) Oral Mucosa every 12 hours  heparin  Injectable 5000 Unit(s) SubCutaneous three times a day  ipratropium 17 MICROgram(s) HFA Inhaler 1 Puff(s) Inhalation every 6 hours  lactated ringers. 1000 milliLiter(s) (100 mL/Hr) IV Continuous <Continuous>  levothyroxine Injectable 25 MICROGram(s) IV Push at bedtime  methylPREDNISolone sodium succinate Injectable 40 milliGRAM(s) IV Push every 8 hours  pantoprazole  Injectable 40 milliGRAM(s) IV Push daily  phenylephrine    Infusion 0.1 MICROgram(s)/kG/Min (3.74 mL/Hr) IV Continuous <Continuous>  propofol Infusion 30 MICROgram(s)/kG/Min (18 mL/Hr) IV Continuous <Continuous>  vancomycin  IVPB 1250 milliGRAM(s) IV Intermittent every 24 hours  vancomycin  IVPB          Vital Signs Last 24 Hrs  T(C): 36 (11 Mar 2020 10:00), Max: 38.9 (10 Mar 2020 13:15)  T(F): 96.8 (11 Mar 2020 10:00), Max: 102 (10 Mar 2020 13:15)  HR: 60 (11 Mar 2020 11:49) (47 - 105)  BP: 125/53 (11 Mar 2020 08:00) (64/39 - 175/63)  BP(mean): 72 (11 Mar 2020 08:00) (46 - 92)  RR: 15 (11 Mar 2020 10:00) (15 - 32)  SpO2: 97% (11 Mar 2020 11:49) (87% - 100%)      Physical Exam:  Constitutional: frail looking; intubated  HEENT: NC/AT, EOMI, PERRLA, conjunctivae clear; ears and nose atraumatic; pharynx benign  Neck: supple; thyroid not palpable  Back: no tenderness  Respiratory: crackles at bases  Cardiovascular: S1S2 regular, no murmurs  Abdomen: soft, not tender, not distended, positive BS; liver and spleen WNL  Genitourinary: no suprapubic tenderness  Lymphatic: no LN palpable  Musculoskeletal: no muscle tenderness, no joint swelling or tenderness  Extremities: no pedal edema  Neurological/ Psychiatric: intubated, no focal deficits   Skin: no rashes; no palpable lesions    Labs: all available labs reviewed                                            12.6   10.00 )-----------( 189      ( 11 Mar 2020 11:35 )             39.6     03-10    137  |  104  |  28<H>  ----------------------------<  92  4.0   |  22  |  1.42<H>    Ca    8.8      10 Mar 2020 08:03    TPro  7.5  /  Alb  2.9<L>  /  TBili  0.8  /  DBili  x   /  AST  146<H>  /  ALT  67  /  AlkPhos  40  03-10        Color: Yellow / Appearance: Clear / S.010 / pH: x  Gluc: x / Ketone: Negative  / Bili: Negative / Urobili: Negative mg/dL   Blood: x / Protein: 15 mg/dL / Nitrite: Negative   Leuk Esterase: Trace / RBC: 3-5 /HPF / WBC 0-2   Sq Epi: x / Non Sq Epi: Occasional / Bacteria: Negative      Culture - Blood (collected 04 Mar 2020 13:40)  Source: .Blood None  Preliminary Report (05 Mar 2020 19:03):    No growth to date.    Culture - Blood (collected 04 Mar 2020 11:33)  Source: .Blood None  Preliminary Report (05 Mar 2020 17:02):    No growth to date.    Culture - Urine (collected 02 Mar 2020 19:34)  Source: .Urine None  Final Report (04 Mar 2020 06:57):    >=3 organisms. Probable collection contamination.    Culture - Blood (collected 02 Mar 2020 17:53)  Source: .Blood Blood-Peripheral  Gram Stain (04 Mar 2020 04:43):    Growth in aerobic bottle:    Gram positive cocci in pairs  Final Report (05 Mar 2020 12:14):    Growth in aerobic bottle: Coag Negative Staphylococcus      -  Coagulase negative Staphylococcus: Detec      Method Type: PCR    Culture - Blood (collected 02 Mar 2020 17:53)  Source: .Blood Blood-Peripheral  Final Report (08 Mar 2020 01:00):    No growth at 5 days.        Radiology: all available radiological tests reviewed  < from: Xray Chest 1 View- PORTABLE-Urgent (03.10.20 @ 15:57) >  EXAM:  XR CHEST PORTABLE URGENT 1V                            PROCEDURE DATE:  03/10/2020          INTERPRETATION:  History: Pneumonia    Portable radiograph of the chest is performed. comparison made to 3/9/2020.    ET tube has placed with the tipapproximately 1.5 cm above the wilian. Bilateral infiltrates are seen likely similar to the prior study. There is no pleural effusion. Osseous structures are unremarkable.    Impression: Status post intubation with ET tube tube tip 1.5 cm above the wilian. Bilateral infiltrates again noted.        < from: Xray Chest 1 View- PORTABLE-Urgent (20 @ 13:58) >  LEFT greater than RIGHT bilateral perihilar basilar airspace consolidations concerning for infectious pneumonia..    < end of copied text >      Advanced directives addressed: DNR

## 2020-03-11 NOTE — PROCEDURE NOTE - ADDITIONAL PROCEDURE DETAILS
Bronchoscopy was done for lovage and to get Sputum cultures, lg,fungal, and bacteral    Levage to the RLL, 60cc NS used    No bleeding    No obvious lesions noted    ET tube pulled back 1CM

## 2020-03-12 LAB
ALBUMIN SERPL ELPH-MCNC: 1.9 G/DL — LOW (ref 3.3–5)
ALP SERPL-CCNC: 47 U/L — SIGNIFICANT CHANGE UP (ref 40–120)
ALT FLD-CCNC: 59 U/L — SIGNIFICANT CHANGE UP (ref 12–78)
ANION GAP SERPL CALC-SCNC: 8 MMOL/L — SIGNIFICANT CHANGE UP (ref 5–17)
AST SERPL-CCNC: 124 U/L — HIGH (ref 15–37)
BILIRUB SERPL-MCNC: 0.6 MG/DL — SIGNIFICANT CHANGE UP (ref 0.2–1.2)
BUN SERPL-MCNC: 45 MG/DL — HIGH (ref 7–23)
CALCIUM SERPL-MCNC: 7.8 MG/DL — LOW (ref 8.5–10.1)
CHLORIDE SERPL-SCNC: 108 MMOL/L — SIGNIFICANT CHANGE UP (ref 96–108)
CK SERPL-CCNC: 691 U/L — HIGH (ref 26–308)
CO2 SERPL-SCNC: 22 MMOL/L — SIGNIFICANT CHANGE UP (ref 22–31)
CREAT SERPL-MCNC: 1.32 MG/DL — HIGH (ref 0.5–1.3)
CRP SERPL-MCNC: 22.82 MG/DL — HIGH (ref 0–0.4)
GLUCOSE SERPL-MCNC: 163 MG/DL — HIGH (ref 70–99)
GRAM STN FLD: SIGNIFICANT CHANGE UP
HCT VFR BLD CALC: 34.8 % — LOW (ref 39–50)
HGB BLD-MCNC: 11.1 G/DL — LOW (ref 13–17)
IL6 FLD-MCNC: 554.5 PG/ML — HIGH (ref 0–15.5)
MCHC RBC-ENTMCNC: 29 PG — SIGNIFICANT CHANGE UP (ref 27–34)
MCHC RBC-ENTMCNC: 31.9 GM/DL — LOW (ref 32–36)
MCV RBC AUTO: 90.9 FL — SIGNIFICANT CHANGE UP (ref 80–100)
PLATELET # BLD AUTO: 175 K/UL — SIGNIFICANT CHANGE UP (ref 150–400)
POTASSIUM SERPL-MCNC: 3.9 MMOL/L — SIGNIFICANT CHANGE UP (ref 3.5–5.3)
POTASSIUM SERPL-SCNC: 3.9 MMOL/L — SIGNIFICANT CHANGE UP (ref 3.5–5.3)
PROT SERPL-MCNC: 6.2 GM/DL — SIGNIFICANT CHANGE UP (ref 6–8.3)
RBC # BLD: 3.83 M/UL — LOW (ref 4.2–5.8)
RBC # FLD: 14.8 % — HIGH (ref 10.3–14.5)
SODIUM SERPL-SCNC: 138 MMOL/L — SIGNIFICANT CHANGE UP (ref 135–145)
SPECIMEN SOURCE: SIGNIFICANT CHANGE UP
WBC # BLD: 6.66 K/UL — SIGNIFICANT CHANGE UP (ref 3.8–10.5)
WBC # FLD AUTO: 6.66 K/UL — SIGNIFICANT CHANGE UP (ref 3.8–10.5)

## 2020-03-12 PROCEDURE — 99291 CRITICAL CARE FIRST HOUR: CPT

## 2020-03-12 RX ADMIN — CEFEPIME 100 MILLIGRAM(S): 1 INJECTION, POWDER, FOR SOLUTION INTRAMUSCULAR; INTRAVENOUS at 05:58

## 2020-03-12 RX ADMIN — PROPOFOL 18 MICROGRAM(S)/KG/MIN: 10 INJECTION, EMULSION INTRAVENOUS at 22:48

## 2020-03-12 RX ADMIN — PROPOFOL 18 MICROGRAM(S)/KG/MIN: 10 INJECTION, EMULSION INTRAVENOUS at 11:36

## 2020-03-12 RX ADMIN — CHLORHEXIDINE GLUCONATE 15 MILLILITER(S): 213 SOLUTION TOPICAL at 17:27

## 2020-03-12 RX ADMIN — Medication 153 MILLIGRAM(S): at 11:40

## 2020-03-12 RX ADMIN — PROPOFOL 18 MICROGRAM(S)/KG/MIN: 10 INJECTION, EMULSION INTRAVENOUS at 17:25

## 2020-03-12 RX ADMIN — Medication 166.67 MILLIGRAM(S): at 13:32

## 2020-03-12 RX ADMIN — AZITHROMYCIN 255 MILLIGRAM(S): 500 TABLET, FILM COATED ORAL at 22:39

## 2020-03-12 RX ADMIN — PROPOFOL 18 MICROGRAM(S)/KG/MIN: 10 INJECTION, EMULSION INTRAVENOUS at 20:30

## 2020-03-12 RX ADMIN — Medication 102 MILLIGRAM(S): at 00:45

## 2020-03-12 RX ADMIN — Medication 153 MILLIGRAM(S): at 01:14

## 2020-03-12 RX ADMIN — HEPARIN SODIUM 5000 UNIT(S): 5000 INJECTION INTRAVENOUS; SUBCUTANEOUS at 06:16

## 2020-03-12 RX ADMIN — PROPOFOL 18 MICROGRAM(S)/KG/MIN: 10 INJECTION, EMULSION INTRAVENOUS at 06:15

## 2020-03-12 RX ADMIN — Medication 153 MILLIGRAM(S): at 06:16

## 2020-03-12 RX ADMIN — PROPOFOL 18 MICROGRAM(S)/KG/MIN: 10 INJECTION, EMULSION INTRAVENOUS at 01:16

## 2020-03-12 RX ADMIN — Medication 81 MILLIGRAM(S): at 11:37

## 2020-03-12 RX ADMIN — Medication 153 MILLIGRAM(S): at 17:27

## 2020-03-12 RX ADMIN — Medication 102 MILLIGRAM(S): at 12:00

## 2020-03-12 RX ADMIN — CEFEPIME 100 MILLIGRAM(S): 1 INJECTION, POWDER, FOR SOLUTION INTRAMUSCULAR; INTRAVENOUS at 17:25

## 2020-03-12 RX ADMIN — HEPARIN SODIUM 5000 UNIT(S): 5000 INJECTION INTRAVENOUS; SUBCUTANEOUS at 22:39

## 2020-03-12 RX ADMIN — CHLORHEXIDINE GLUCONATE 15 MILLILITER(S): 213 SOLUTION TOPICAL at 05:59

## 2020-03-12 RX ADMIN — SODIUM CHLORIDE 100 MILLILITER(S): 9 INJECTION, SOLUTION INTRAVENOUS at 11:37

## 2020-03-12 RX ADMIN — HEPARIN SODIUM 5000 UNIT(S): 5000 INJECTION INTRAVENOUS; SUBCUTANEOUS at 13:31

## 2020-03-12 RX ADMIN — PANTOPRAZOLE SODIUM 40 MILLIGRAM(S): 20 TABLET, DELAYED RELEASE ORAL at 11:40

## 2020-03-12 RX ADMIN — Medication 25 MICROGRAM(S): at 22:39

## 2020-03-12 RX ADMIN — PROPOFOL 18 MICROGRAM(S)/KG/MIN: 10 INJECTION, EMULSION INTRAVENOUS at 13:31

## 2020-03-12 NOTE — PROGRESS NOTE ADULT - ASSESSMENT
89 yo M with Hx of HTN, HLD, CAD s/p cardiac stent placement, colon ca s/p resection, hypothyroidism presents with 3 day history of progressively worsening weakness and lethargy with associated dry cough. She notes patient generally sleeps alot during the day and easily falls asleep but notes that he was very lethargic requiring more effort to be aroused.  Daughter describes pt having a sick appearance. Pt has had decreased appetite. Pt has 2 recent sick contacts, son had flu and family co-worker was very ill recently. He is constipated intermittently, for which he has taken milk of magnesia. Denies any recent hospitalizations. Pt last travelled in Jan 2020 to [place] and has plans to travel there this week. In the ED pt was hypoxic to 89% on RA. Pt was given 3.1 L bolus of LR and IV ceftriaxone/azithromycin. Noted initially febrile 102, nl wbc ct, CT chest/abd/pelvis RML-RLL atelectasis, lingular pulm nodule.     1. Acute hypoxic respiratory failure. viral syndrome with COVID-19. superimposed pna/ARDS. CRF stage 3. PMR. Immunocompromised host.   - remains intubated, off pressors, repeat xray w improvement  - legionella ag (-), strep urine ag (-), f/u sputum cx for fungal orgs/legionella  - s/p bronchoscopy f/u BAL cx  - on contact/airborne precautions for now  - on IV vancomycin [id number] #2 check trough prior to 4th dose  - on IV cefepime [id number] #3  - on azithromycin 500mg daily #2  - blood cx/urine cx/RVP all unremarkable  - elevated CRP, CPK, AST, LDH  - rheumatology eval noted   - when able, suggest CT chest  - tolerating abx well so far; no side effects noted  - reason for abx use and side effects reviewed with patient  - supportive care  - fu cbc    2. other issues - care per medicine       - may require intubation/tx to ICU  - on IV cefepime [id number]  - start vancomycin [id number] check trough prior to 4th dose   - RVP 3/9, 3/2 (-) f/u legionella ag/strep urine ag   - check sputum cx   - CK/ferritin elevated, plan for MRI femur when able r/o myositis   - reason for abx use and side effects reviewed with patient; monitor BMP   - rheumatology evaluation appreciated  - respiratory care  - s/p ceftriaxone and azithro several days ago  - TTE reviewed, no vegetations   - monitor temps  - f/u CBC  - supportive care    2. Other issues:   -care per medicine

## 2020-03-12 NOTE — PROGRESS NOTE ADULT - RS GEN PE MLT RESP DETAILS PC
breath sounds equal/no rhonchi/no wheezes
no rhonchi/breath sounds equal/no rales/no wheezes
respirations non-labored/breath sounds equal/airway patent/good air movement

## 2020-03-12 NOTE — PROGRESS NOTE ADULT - GASTROINTESTINAL DETAILS
no distention/nontender/soft/bowel sounds normal
soft/no distention
nontender/no distention/bowel sounds normal/soft

## 2020-03-12 NOTE — PROGRESS NOTE ADULT - SUBJECTIVE AND OBJECTIVE BOX
Date of service: 20 @ 13:10    Pt seen and examined  temps down  remains intubated Fio2 60, PEEP 18  off pressors  on tube feeds, having loose stools    ROS: unable to obtain d/t medical condition    MEDICATIONS  (STANDING):  ALBUTerol    90 MICROgram(s) HFA Inhaler 2 Puff(s) Inhalation every 6 hours  ascorbic acid IVPB 1500 milliGRAM(s) IV Intermittent every 6 hours  aspirin  chewable 81 milliGRAM(s) Oral daily  azithromycin  IVPB 500 milliGRAM(s) IV Intermittent every 24 hours  cefepime   IVPB 2000 milliGRAM(s) IV Intermittent every 12 hours  chlorhexidine 0.12% Liquid 15 milliLiter(s) Oral Mucosa every 12 hours  heparin  Injectable 5000 Unit(s) SubCutaneous three times a day  ipratropium 17 MICROgram(s) HFA Inhaler 1 Puff(s) Inhalation every 6 hours  lactated ringers. 1000 milliLiter(s) (100 mL/Hr) IV Continuous <Continuous>  levothyroxine Injectable 25 MICROGram(s) IV Push at bedtime  pantoprazole  Injectable 40 milliGRAM(s) IV Push daily  phenylephrine    Infusion 0.1 MICROgram(s)/kG/Min (3.74 mL/Hr) IV Continuous <Continuous>  propofol Infusion 30 MICROgram(s)/kG/Min (18 mL/Hr) IV Continuous <Continuous>  thiamine IVPB 200 milliGRAM(s) IV Intermittent <User Schedule>  vancomycin  IVPB 1250 milliGRAM(s) IV Intermittent every 24 hours  vancomycin  IVPB          Vital Signs Last 24 Hrs  T(C): 36.5 (12 Mar 2020 06:00), Max: 37 (11 Mar 2020 22:00)  T(F): 97.7 (12 Mar 2020 06:00), Max: 98.6 (11 Mar 2020 22:00)  HR: 69 (12 Mar 2020 10:00) (49 - 69)  BP: 113/48 (12 Mar 2020 08:00) (100/46 - 118/50)  BP(mean): 63 (12 Mar 2020 08:00) (59 - 67)  RR: 26 (12 Mar 2020 10:00) (17 - 30)  SpO2: 95% (12 Mar 2020 10:00) (95% - 99%)        Physical Exam:  Constitutional: frail looking; intubated  HEENT: NC/AT, EOMI, PERRLA, conjunctivae clear; ears and nose atraumatic; pharynx benign  Neck: supple; thyroid not palpable  Back: no tenderness  Respiratory: crackles at bases  Cardiovascular: S1S2 regular, no murmurs  Abdomen: soft, not tender, not distended, positive BS; liver and spleen WNL  Genitourinary: no suprapubic tenderness  Lymphatic: no LN palpable  Musculoskeletal: no muscle tenderness, no joint swelling or tenderness  Extremities: no pedal edema  Neurological/ Psychiatric: intubated, no focal deficits   Skin: no rashes; no palpable lesions    Labs: all available labs reviewed                                   11.1   6.66  )-----------( 175      ( 12 Mar 2020 06:20 )             34.8     03-12    138  |  108  |  45<H>  ----------------------------<  163<H>  3.9   |  22  |  1.32<H>    Ca    7.8<L>      12 Mar 2020 06:20  Phos  5.0     03-11  Mg     2.2     03-11    TPro  6.2  /  Alb  1.9<L>  /  TBili  0.6  /  DBili  x   /  AST  124<H>  /  ALT  59  /  AlkPhos  47  03-12          Color: Yellow / Appearance: Clear / S.010 / pH: x  Gluc: x / Ketone: Negative  / Bili: Negative / Urobili: Negative mg/dL   Blood: x / Protein: 15 mg/dL / Nitrite: Negative   Leuk Esterase: Trace / RBC: 3-5 /HPF / WBC 0-2   Sq Epi: x / Non Sq Epi: Occasional / Bacteria: Negative      Culture - Blood (collected 04 Mar 2020 13:40)  Source: .Blood None  Preliminary Report (05 Mar 2020 19:03):    No growth to date.    Culture - Blood (collected 04 Mar 2020 11:33)  Source: .Blood None  Preliminary Report (05 Mar 2020 17:02):    No growth to date.    Culture - Urine (collected 02 Mar 2020 19:34)  Source: .Urine None  Final Report (04 Mar 2020 06:57):    >=3 organisms. Probable collection contamination.    Culture - Blood (collected 02 Mar 2020 17:53)  Source: .Blood Blood-Peripheral  Gram Stain (04 Mar 2020 04:43):    Growth in aerobic bottle:    Gram positive cocci in pairs  Final Report (05 Mar 2020 12:14):    Growth in aerobic bottle: Coag Negative Staphylococcus      -  Coagulase negative Staphylococcus: Detec      Method Type: PCR    Culture - Blood (collected 02 Mar 2020 17:53)  Source: .Blood Blood-Peripheral  Final Report (08 Mar 2020 01:00):    No growth at 5 days.    Culture - Sputum . (20 @ 11:35)    Gram Stain:   Numerous polymorphonuclear leukocytes per low power field  No Squamous epithelial cells per low power field  No organisms seen per oil power field    Specimen Source: .Sputum Sputum      < from: Xray Chest 1 View- PORTABLE-Urgent (20 @ 12:32) >  EXAM:  XR CHEST PORTABLE URGENT 1V                            PROCEDURE DATE:  2020          INTERPRETATION:  EXAM:XR CHEST URGENT.     CLINICAL INDICATION: Pneumonia .     TECHNIQUE: Portable upright AP view.     PRIOR EXAM: 2020 at 8:37 AM.     FINDINGS:      Endotracheal tube remains in place. There is a new nasogastric tube extending into the left upper abdomen. Bilateral predominantly perihilar infiltrates/edema demonstrates significant improvement. A small right pleural effusion is suspected. Right shoulder prosthesis is partially visualized.      IMPRESSION:     Significant improvement and evidence of a new nasogastric tube.    < end of copied text >        Radiology: all available radiological tests reviewed      < from: Xray Chest 1 View- PORTABLE-Urgent (03.10.20 @ 15:57) >  EXAM:  XR CHEST PORTABLE URGENT 1V                            PROCEDURE DATE:  03/10/2020          INTERPRETATION:  History: Pneumonia    Portable radiograph of the chest is performed. comparison made to 3/9/2020.    ET tube has placed with the tipapproximately 1.5 cm above the wilian. Bilateral infiltrates are seen likely similar to the prior study. There is no pleural effusion. Osseous structures are unremarkable.    Impression: Status post intubation with ET tube tube tip 1.5 cm above the wilian. Bilateral infiltrates again noted.        < from: Xray Chest 1 View- PORTABLE-Urgent (20 @ 13:58) >  LEFT greater than RIGHT bilateral perihilar basilar airspace consolidations concerning for infectious pneumonia..    < end of copied text >      Advanced directives addressed: DNR

## 2020-03-12 NOTE — PROGRESS NOTE ADULT - SUBJECTIVE AND OBJECTIVE BOX
89M Covid-19+ ARDS   DNR.    Improving oxygenation have titrated Peep down from 18 fio2 to 50%  Off pressors   Crp decreasing  Lymphs increasing   CRAIG improving CPK downtrending    Still tenuous but seemingly better since vit c thiamine and azithro added.      MV remains high as does A-a gradient.  No SBT consideration    Sedate with propofol.

## 2020-03-12 NOTE — PROGRESS NOTE ADULT - ASSESSMENT
89 yo M admitted 3/2 with progressive dyspnea and progression to ARDS now know to be COVID-19 positive.  acute hypoxic respiratory failure, ARDS, viral COVID-19 sequelae   CRAIG likely from ATN  Shock requiring pressors - improved; potentially hypotension was due to vasodilatory effects of sedative agent  pt remains tenuous and at high risk for deterioration, morbidity and mortality given underlying disease process and degree of respiratory compromise.    Plan at this time is for continued care in ICU  Vent support - NO SAT/SBT given high level of support required  TF  Cefepime, Vancomycin, Zithromax  IVF  Synthroid IV  has been on prednisone chronically - monitor OFF steroids for now given detrimental effect on COVID-19 recovery  low threshold to administer stress-dose hydrocortisone if pt becomes hemodynamically unstable  ID input appreciated  DNR per known wishes 89 yo M admitted 3/2 with progressive dyspnea and progression to ARDS now know to be COVID-19 positive.  acute hypoxic respiratory failure, ARDS, viral COVID-19 sequelae   CRAIG likely from ATN  Shock requiring pressors - improved; potentially hypotension was due to vasodilatory effects of sedative agent  pt remains tenuous and at high risk for deterioration, morbidity and mortality given underlying disease process and degree of respiratory compromise.    Plan at this time is for continued care in ICU  Vent support - NO SAT/SBT given high level of support required  TF  Cefepime, Vancomycin, Zithromax  IVF  Synthroid IV  has been on prednisone chronically - monitor OFF steroids for now given detrimental effect on COVID-19 recovery  low threshold to administer stress-dose hydrocortisone if pt becomes hemodynamically unstable  ID input appreciated  DNR per known wishes - spoke with daughter in [place] suggests taht pt would NOT want trach or PEG either.  Supportive care

## 2020-03-12 NOTE — PROGRESS NOTE ADULT - SUBJECTIVE AND OBJECTIVE BOX
89 yo M admitted 3/2 - progressively worsening respiratory distresss and B/L infiltrates progressing to ARDS - now known to be (+) COVID-19    3/11: The patient remains intubated on pressors, on 80% Fio2 and 18 PEEP.  COVID-19 test (+)     above d/w Dr Lamb who has been caring for the patient in ICU    3/12: currently OFF vasopressors but remains intubated - AC   TF in progress        Allergies    penicillins (Unknown)      ICU Vital Signs Last 24 Hrs  T(C): 36.2 (12 Mar 2020 13:00), Max: 37 (11 Mar 2020 22:00)  T(F): 97.2 (12 Mar 2020 13:00), Max: 98.6 (11 Mar 2020 22:00)  HR: 69 (12 Mar 2020 17:00) (52 - 72)  BP: 93/43 (12 Mar 2020 16:00) (93/43 - 118/50)  BP(mean): 52 (12 Mar 2020 16:00) (52 - 67)  ABP: 126/48 (12 Mar 2020 17:00) (92/41 - 134/59)  ABP(mean): 71 (12 Mar 2020 17:00) (56 - 82)  RR: 25 (12 Mar 2020 17:00) (17 - 30)  SpO2: 94% (12 Mar 2020 17:00) (93% - 99%)      Mode: AC/ CMV (Assist Control/ Continuous Mandatory Ventilation)  RR (machine): 16  TV (machine): 550  FiO2: 70  PEEP: 18  ITime: 1  PIP: 29      I&O's Summary    11 Mar 2020 07:01  -  12 Mar 2020 07:00  --------------------------------------------------------  IN: 3470 mL / OUT: 1350 mL / NET: 2120 mL    12 Mar 2020 07:01  -  12 Mar 2020 18:36  --------------------------------------------------------  IN: 450 mL / OUT: 0 mL / NET: 450 mL                              11.1   6.66  )-----------( 175      ( 12 Mar 2020 06:20 )             34.8       03-12    138  |  108  |  45<H>  ----------------------------<  163<H>  3.9   |  22  |  1.32<H>    Ca    7.8<L>      12 Mar 2020 06:20  Phos  5.0     03-11  Mg     2.2     03-11    TPro  6.2  /  Alb  1.9<L>  /  TBili  0.6  /  DBili  x   /  AST  124<H>  /  ALT  59  /  AlkPhos  47  03-12      CAPILLARY BLOOD GLUCOSE          LIVER FUNCTIONS - ( 12 Mar 2020 06:20 )  Alb: 1.9 g/dL / Pro: 6.2 gm/dL / ALK PHOS: 47 U/L / ALT: 59 U/L / AST: 124 U/L / GGT: x             CARDIAC MARKERS ( 12 Mar 2020 06:20 )  x     / x     / 691 U/L / x     / x      CARDIAC MARKERS ( 11 Mar 2020 11:35 )  x     / x     / 1866 U/L / x     / x        ABG - ( 11 Mar 2020 08:11 )  pH, Arterial: 7.32  pH, Blood: x     /  pCO2: 42    /  pO2: 85    / HCO3: 21    / Base Excess: -4.4  /  SaO2: 96          MEDICATIONS  (STANDING):  ALBUTerol    90 MICROgram(s) HFA Inhaler 2 Puff(s) Inhalation every 6 hours  ascorbic acid IVPB 1500 milliGRAM(s) IV Intermittent every 6 hours  aspirin  chewable 81 milliGRAM(s) Oral daily  azithromycin  IVPB 500 milliGRAM(s) IV Intermittent every 24 hours  cefepime   IVPB 2000 milliGRAM(s) IV Intermittent every 12 hours  chlorhexidine 0.12% Liquid 15 milliLiter(s) Oral Mucosa every 12 hours  heparin  Injectable 5000 Unit(s) SubCutaneous three times a day  ipratropium 17 MICROgram(s) HFA Inhaler 1 Puff(s) Inhalation every 6 hours  lactated ringers. 1000 milliLiter(s) (100 mL/Hr) IV Continuous <Continuous>  levothyroxine Injectable 25 MICROGram(s) IV Push at bedtime  pantoprazole  Injectable 40 milliGRAM(s) IV Push daily  phenylephrine    Infusion 0.1 MICROgram(s)/kG/Min (3.74 mL/Hr) IV Continuous <Continuous>  propofol Infusion 30 MICROgram(s)/kG/Min (18 mL/Hr) IV Continuous <Continuous>  thiamine IVPB 200 milliGRAM(s) IV Intermittent <User Schedule>  vancomycin  IVPB 1250 milliGRAM(s) IV Intermittent every 24 hours  vancomycin  IVPB        MEDICATIONS  (PRN):  acetaminophen  Suppository .. 650 milliGRAM(s) Rectal every 6 hours PRN Temp greater or equal to 38C (100.4F)  ALBUTerol    90 MICROgram(s) HFA Inhaler 1 Puff(s) Inhalation every 4 hours PRN Shortness of Breath and/or Wheezing          DVT Prophylaxis:    Advanced Directives: DNR  Discussed with: LYLE    Visit Information:  45 minutes of Critical Care time spent providing medical care for patient's acute illness/conditions that impairs at least one vital organ system and/or poses a high risk of imminent or life threatening deterioration in the patient's condition.  It includes time spent reviewing labs, radiology, discussing goals of care with patient and/or family, and discussing the case with a multidisciplinary team in an effort to prevent further life threatening deterioration or end organ damage.  This time is independent of any procedures performed.    ** Time is exclusive of billed procedures and/or teaching and/or routine family updates. 89 yo M admitted 3/2 - progressively worsening respiratory distresss and B/L infiltrates progressing to ARDS - now known to be (+) COVID-19    3/11: The patient remains intubated on pressors, on 80% Fio2 and 18 PEEP.  COVID-19 test (+)     above d/w Dr Lamb who has been caring for the patient in ICU    3/12: currently OFF vasopressors but remains intubated - AC 16/450/60% PEEP = 18cm  TF in progress  case discussed with pts daughter in [place] - issues/plans and all questions discussed with her in detail.        Allergies    penicillins (Unknown)      ICU Vital Signs Last 24 Hrs  T(C): 36.2 (12 Mar 2020 13:00), Max: 37 (11 Mar 2020 22:00)  T(F): 97.2 (12 Mar 2020 13:00), Max: 98.6 (11 Mar 2020 22:00)  HR: 69 (12 Mar 2020 17:00) (52 - 72)  BP: 93/43 (12 Mar 2020 16:00) (93/43 - 118/50)  BP(mean): 52 (12 Mar 2020 16:00) (52 - 67)  ABP: 126/48 (12 Mar 2020 17:00) (92/41 - 134/59)  ABP(mean): 71 (12 Mar 2020 17:00) (56 - 82)  RR: 25 (12 Mar 2020 17:00) (17 - 30)  SpO2: 94% (12 Mar 2020 17:00) (93% - 99%)      Mode: AC/ CMV (Assist Control/ Continuous Mandatory Ventilation)  RR (machine): 16  TV (machine): 550  FiO2: 70  PEEP: 18  ITime: 1  PIP: 29      I&O's Summary    11 Mar 2020 07:01  -  12 Mar 2020 07:00  --------------------------------------------------------  IN: 3470 mL / OUT: 1350 mL / NET: 2120 mL    12 Mar 2020 07:01  -  12 Mar 2020 18:36  --------------------------------------------------------  IN: 450 mL / OUT: 0 mL / NET: 450 mL                              11.1   6.66  )-----------( 175      ( 12 Mar 2020 06:20 )             34.8       03-12    138  |  108  |  45<H>  ----------------------------<  163<H>  3.9   |  22  |  1.32<H>    Ca    7.8<L>      12 Mar 2020 06:20  Phos  5.0     03-11  Mg     2.2     03-11    TPro  6.2  /  Alb  1.9<L>  /  TBili  0.6  /  DBili  x   /  AST  124<H>  /  ALT  59  /  AlkPhos  47  03-12      CAPILLARY BLOOD GLUCOSE          LIVER FUNCTIONS - ( 12 Mar 2020 06:20 )  Alb: 1.9 g/dL / Pro: 6.2 gm/dL / ALK PHOS: 47 U/L / ALT: 59 U/L / AST: 124 U/L / GGT: x             CARDIAC MARKERS ( 12 Mar 2020 06:20 )  x     / x     / 691 U/L / x     / x      CARDIAC MARKERS ( 11 Mar 2020 11:35 )  x     / x     / 1866 U/L / x     / x        ABG - ( 11 Mar 2020 08:11 )  pH, Arterial: 7.32  pH, Blood: x     /  pCO2: 42    /  pO2: 85    / HCO3: 21    / Base Excess: -4.4  /  SaO2: 96          MEDICATIONS  (STANDING):  ALBUTerol    90 MICROgram(s) HFA Inhaler 2 Puff(s) Inhalation every 6 hours  ascorbic acid IVPB 1500 milliGRAM(s) IV Intermittent every 6 hours  aspirin  chewable 81 milliGRAM(s) Oral daily  azithromycin  IVPB 500 milliGRAM(s) IV Intermittent every 24 hours  cefepime   IVPB 2000 milliGRAM(s) IV Intermittent every 12 hours  chlorhexidine 0.12% Liquid 15 milliLiter(s) Oral Mucosa every 12 hours  heparin  Injectable 5000 Unit(s) SubCutaneous three times a day  ipratropium 17 MICROgram(s) HFA Inhaler 1 Puff(s) Inhalation every 6 hours  lactated ringers. 1000 milliLiter(s) (100 mL/Hr) IV Continuous <Continuous>  levothyroxine Injectable 25 MICROGram(s) IV Push at bedtime  pantoprazole  Injectable 40 milliGRAM(s) IV Push daily  phenylephrine    Infusion 0.1 MICROgram(s)/kG/Min (3.74 mL/Hr) IV Continuous <Continuous>  propofol Infusion 30 MICROgram(s)/kG/Min (18 mL/Hr) IV Continuous <Continuous>  thiamine IVPB 200 milliGRAM(s) IV Intermittent <User Schedule>  vancomycin  IVPB 1250 milliGRAM(s) IV Intermittent every 24 hours  vancomycin  IVPB        MEDICATIONS  (PRN):  acetaminophen  Suppository .. 650 milliGRAM(s) Rectal every 6 hours PRN Temp greater or equal to 38C (100.4F)  ALBUTerol    90 MICROgram(s) HFA Inhaler 1 Puff(s) Inhalation every 4 hours PRN Shortness of Breath and/or Wheezing          DVT Prophylaxis: SQ heparin, venodynes    Advanced Directives: DNR  [facility]    Visit Information:  45 minutes of Critical Care time spent providing medical care for patient's acute illness/conditions that impairs at least one vital organ system and/or poses a high risk of imminent or life threatening deterioration in the patient's condition.  It includes time spent reviewing labs, radiology, discussing goals of care with patient and/or family, and discussing the case with a multidisciplinary team in an effort to prevent further life threatening deterioration or end organ damage.  This time is independent of any procedures performed.    ** Time is exclusive of billed procedures and/or teaching and/or routine family updates.

## 2020-03-13 LAB
ALBUMIN SERPL ELPH-MCNC: 1.9 G/DL — LOW (ref 3.3–5)
ALP SERPL-CCNC: 73 U/L — SIGNIFICANT CHANGE UP (ref 40–120)
ALT FLD-CCNC: 60 U/L — SIGNIFICANT CHANGE UP (ref 12–78)
ANION GAP SERPL CALC-SCNC: 9 MMOL/L — SIGNIFICANT CHANGE UP (ref 5–17)
AST SERPL-CCNC: 107 U/L — HIGH (ref 15–37)
BASE EXCESS BLDA CALC-SCNC: -5.6 MMOL/L — LOW (ref -2–2)
BILIRUB SERPL-MCNC: 0.7 MG/DL — SIGNIFICANT CHANGE UP (ref 0.2–1.2)
BLOOD GAS COMMENTS ARTERIAL: SIGNIFICANT CHANGE UP
BUN SERPL-MCNC: 59 MG/DL — HIGH (ref 7–23)
CALCIUM SERPL-MCNC: 7.9 MG/DL — LOW (ref 8.5–10.1)
CHLORIDE SERPL-SCNC: 107 MMOL/L — SIGNIFICANT CHANGE UP (ref 96–108)
CO2 SERPL-SCNC: 21 MMOL/L — LOW (ref 22–31)
CREAT SERPL-MCNC: 1.41 MG/DL — HIGH (ref 0.5–1.3)
CRP SERPL-MCNC: 9.6 MG/DL — HIGH (ref 0–0.4)
CULTURE RESULTS: SIGNIFICANT CHANGE UP
GAS PNL BLDA: SIGNIFICANT CHANGE UP
GLUCOSE SERPL-MCNC: 165 MG/DL — HIGH (ref 70–99)
HCO3 BLDA-SCNC: 20 MMOL/L — LOW (ref 21–29)
HCT VFR BLD CALC: 32.3 % — LOW (ref 39–50)
HGB BLD-MCNC: 10.8 G/DL — LOW (ref 13–17)
MCHC RBC-ENTMCNC: 29.8 PG — SIGNIFICANT CHANGE UP (ref 27–34)
MCHC RBC-ENTMCNC: 33.4 GM/DL — SIGNIFICANT CHANGE UP (ref 32–36)
MCV RBC AUTO: 89 FL — SIGNIFICANT CHANGE UP (ref 80–100)
PCO2 BLDA: 39 MMHG — SIGNIFICANT CHANGE UP (ref 32–46)
PH BLDA: 7.32 — LOW (ref 7.35–7.45)
PLATELET # BLD AUTO: 201 K/UL — SIGNIFICANT CHANGE UP (ref 150–400)
PO2 BLDA: 80 MMHG — SIGNIFICANT CHANGE UP (ref 74–108)
POTASSIUM SERPL-MCNC: 4 MMOL/L — SIGNIFICANT CHANGE UP (ref 3.5–5.3)
POTASSIUM SERPL-SCNC: 4 MMOL/L — SIGNIFICANT CHANGE UP (ref 3.5–5.3)
PROT SERPL-MCNC: 6.2 GM/DL — SIGNIFICANT CHANGE UP (ref 6–8.3)
RBC # BLD: 3.63 M/UL — LOW (ref 4.2–5.8)
RBC # FLD: 15.1 % — HIGH (ref 10.3–14.5)
SAO2 % BLDA: 94 % — SIGNIFICANT CHANGE UP (ref 92–96)
SODIUM SERPL-SCNC: 137 MMOL/L — SIGNIFICANT CHANGE UP (ref 135–145)
SPECIMEN SOURCE: SIGNIFICANT CHANGE UP
WBC # BLD: 8.79 K/UL — SIGNIFICANT CHANGE UP (ref 3.8–10.5)
WBC # FLD AUTO: 8.79 K/UL — SIGNIFICANT CHANGE UP (ref 3.8–10.5)

## 2020-03-13 PROCEDURE — 99291 CRITICAL CARE FIRST HOUR: CPT

## 2020-03-13 RX ADMIN — PANTOPRAZOLE SODIUM 40 MILLIGRAM(S): 20 TABLET, DELAYED RELEASE ORAL at 11:42

## 2020-03-13 RX ADMIN — Medication 81 MILLIGRAM(S): at 11:42

## 2020-03-13 RX ADMIN — HEPARIN SODIUM 5000 UNIT(S): 5000 INJECTION INTRAVENOUS; SUBCUTANEOUS at 15:21

## 2020-03-13 RX ADMIN — ALBUTEROL 2 PUFF(S): 90 AEROSOL, METERED ORAL at 08:54

## 2020-03-13 RX ADMIN — CHLORHEXIDINE GLUCONATE 15 MILLILITER(S): 213 SOLUTION TOPICAL at 06:48

## 2020-03-13 RX ADMIN — PROPOFOL 18 MICROGRAM(S)/KG/MIN: 10 INJECTION, EMULSION INTRAVENOUS at 07:57

## 2020-03-13 RX ADMIN — Medication 1 PUFF(S): at 14:00

## 2020-03-13 RX ADMIN — Medication 153 MILLIGRAM(S): at 00:54

## 2020-03-13 RX ADMIN — Medication 166.67 MILLIGRAM(S): at 15:21

## 2020-03-13 RX ADMIN — ALBUTEROL 2 PUFF(S): 90 AEROSOL, METERED ORAL at 01:25

## 2020-03-13 RX ADMIN — PROPOFOL 18 MICROGRAM(S)/KG/MIN: 10 INJECTION, EMULSION INTRAVENOUS at 00:55

## 2020-03-13 RX ADMIN — Medication 1 PUFF(S): at 08:54

## 2020-03-13 RX ADMIN — HEPARIN SODIUM 5000 UNIT(S): 5000 INJECTION INTRAVENOUS; SUBCUTANEOUS at 06:46

## 2020-03-13 RX ADMIN — HEPARIN SODIUM 5000 UNIT(S): 5000 INJECTION INTRAVENOUS; SUBCUTANEOUS at 22:28

## 2020-03-13 RX ADMIN — SODIUM CHLORIDE 100 MILLILITER(S): 9 INJECTION, SOLUTION INTRAVENOUS at 11:33

## 2020-03-13 RX ADMIN — CEFEPIME 100 MILLIGRAM(S): 1 INJECTION, POWDER, FOR SOLUTION INTRAMUSCULAR; INTRAVENOUS at 17:56

## 2020-03-13 RX ADMIN — PROPOFOL 18 MICROGRAM(S)/KG/MIN: 10 INJECTION, EMULSION INTRAVENOUS at 19:30

## 2020-03-13 RX ADMIN — AZITHROMYCIN 255 MILLIGRAM(S): 500 TABLET, FILM COATED ORAL at 22:28

## 2020-03-13 RX ADMIN — PROPOFOL 18 MICROGRAM(S)/KG/MIN: 10 INJECTION, EMULSION INTRAVENOUS at 22:28

## 2020-03-13 RX ADMIN — SODIUM CHLORIDE 100 MILLILITER(S): 9 INJECTION, SOLUTION INTRAVENOUS at 00:57

## 2020-03-13 RX ADMIN — Medication 1 PUFF(S): at 01:26

## 2020-03-13 RX ADMIN — Medication 25 MICROGRAM(S): at 22:28

## 2020-03-13 RX ADMIN — Medication 153 MILLIGRAM(S): at 06:48

## 2020-03-13 RX ADMIN — CEFEPIME 100 MILLIGRAM(S): 1 INJECTION, POWDER, FOR SOLUTION INTRAMUSCULAR; INTRAVENOUS at 06:46

## 2020-03-13 RX ADMIN — PROPOFOL 18 MICROGRAM(S)/KG/MIN: 10 INJECTION, EMULSION INTRAVENOUS at 17:56

## 2020-03-13 RX ADMIN — PROPOFOL 18 MICROGRAM(S)/KG/MIN: 10 INJECTION, EMULSION INTRAVENOUS at 15:21

## 2020-03-13 RX ADMIN — ALBUTEROL 2 PUFF(S): 90 AEROSOL, METERED ORAL at 13:59

## 2020-03-13 RX ADMIN — PROPOFOL 18 MICROGRAM(S)/KG/MIN: 10 INJECTION, EMULSION INTRAVENOUS at 11:42

## 2020-03-13 RX ADMIN — Medication 102 MILLIGRAM(S): at 00:53

## 2020-03-13 RX ADMIN — CHLORHEXIDINE GLUCONATE 15 MILLILITER(S): 213 SOLUTION TOPICAL at 17:56

## 2020-03-13 NOTE — PROGRESS NOTE ADULT - SUBJECTIVE AND OBJECTIVE BOX
89 yo M admitted 3/2 - progressively worsening respiratory distresss and B/L infiltrates progressing to ARDS - now known to be (+) COVID-19    3/11: The patient remains intubated on pressors, on 80% Fio2 and 18 PEEP.  COVID-19 test (+)     above d/w Dr Lamb who has been caring for the patient in ICU    3/12: currently OFF vasopressors but remains intubated - AC 16/450/60% PEEP = 18cm  TF in progress  case discussed with pts daughter in [place] - issues/plans and all questions discussed with her in detail.    3/13: hemodynamics reasonable - remains intubated - sedated - TF in progress.  Unable to titrate PEEP or FiO2 at this time - pt desaturated into low 80's % patrick with decrease in PEEP from 18=>15cm and was therefore immediately returned to PEEP 18cm with subsequent improvement in oxygenation.    Allergies    penicillins (Unknown)        ICU Vital Signs Last 24 Hrs  T(C): 36.4 (13 Mar 2020 06:00), Max: 36.9 (13 Mar 2020 01:00)  T(F): 97.5 (13 Mar 2020 06:00), Max: 98.4 (13 Mar 2020 01:00)  HR: 67 (13 Mar 2020 09:00) (61 - 74)  BP: 121/51 (13 Mar 2020 08:00) (89/40 - 122/50)  BP(mean): 68 (13 Mar 2020 08:00) (51 - 69)  ABP: 130/50 (13 Mar 2020 09:00) (92/41 - 143/59)  ABP(mean): 74 (13 Mar 2020 09:00) (56 - 85)  RR: 24 (13 Mar 2020 09:00) (16 - 28)  SpO2: 91% (13 Mar 2020 09:00) (85% - 97%)      Mode: AC/ CMV (Assist Control/ Continuous Mandatory Ventilation)  RR (machine): 16  TV (machine): 450  FiO2: 60  PEEP: 16  PIP: 30      I&O's Summary    12 Mar 2020 07:01  -  13 Mar 2020 07:00  --------------------------------------------------------  IN: 5398 mL / OUT: 2015 mL / NET: 3383 mL                              10.8   8.79  )-----------( 201      ( 13 Mar 2020 07:00 )             32.3       03-13    137  |  107  |  59<H>  ----------------------------<  165<H>  4.0   |  21<L>  |  1.41<H>    Ca    7.9<L>      13 Mar 2020 07:00  Phos  5.0     03-11  Mg     2.2     03-11    TPro  6.2  /  Alb  1.9<L>  /  TBili  0.7  /  DBili  x   /  AST  107<H>  /  ALT  60  /  AlkPhos  73  03-13      CAPILLARY BLOOD GLUCOSE          LIVER FUNCTIONS - ( 13 Mar 2020 07:00 )  Alb: 1.9 g/dL / Pro: 6.2 gm/dL / ALK PHOS: 73 U/L / ALT: 60 U/L / AST: 107 U/L / GGT: x             CARDIAC MARKERS ( 12 Mar 2020 06:20 )  x     / x     / 691 U/L / x     / x      CARDIAC MARKERS ( 11 Mar 2020 11:35 )  x     / x     / 1866 U/L / x     / x          ABG - ( 13 Mar 2020 05:38 )  pH, Arterial: 7.32  pH, Blood: x     /  pCO2: 39    /  pO2: 80    / HCO3: 20    / Base Excess: -5.6  /  SaO2: 94            MEDICATIONS  (STANDING):  ALBUTerol    90 MICROgram(s) HFA Inhaler 2 Puff(s) Inhalation every 6 hours  ascorbic acid IVPB 1500 milliGRAM(s) IV Intermittent every 6 hours  aspirin  chewable 81 milliGRAM(s) Oral daily  azithromycin  IVPB 500 milliGRAM(s) IV Intermittent every 24 hours  cefepime   IVPB 2000 milliGRAM(s) IV Intermittent every 12 hours  chlorhexidine 0.12% Liquid 15 milliLiter(s) Oral Mucosa every 12 hours  heparin  Injectable 5000 Unit(s) SubCutaneous three times a day  ipratropium 17 MICROgram(s) HFA Inhaler 1 Puff(s) Inhalation every 6 hours  lactated ringers. 1000 milliLiter(s) (100 mL/Hr) IV Continuous <Continuous>  levothyroxine Injectable 25 MICROGram(s) IV Push at bedtime  pantoprazole  Injectable 40 milliGRAM(s) IV Push daily  propofol Infusion 30 MICROgram(s)/kG/Min (18 mL/Hr) IV Continuous <Continuous>  thiamine IVPB 200 milliGRAM(s) IV Intermittent <User Schedule>  vancomycin  IVPB 1250 milliGRAM(s) IV Intermittent every 24 hours  vancomycin  IVPB        MEDICATIONS  (PRN):  acetaminophen  Suppository .. 650 milliGRAM(s) Rectal every 6 hours PRN Temp greater or equal to 38C (100.4F)  ALBUTerol    90 MICROgram(s) HFA Inhaler 1 Puff(s) Inhalation every 4 hours PRN Shortness of Breath and/or Wheezing    Review of Systems:   · Additional ROS	JASON due to intubation/sedation	    Physical Exam:   · Constitutional	detailed exam	  · Constitutional Details	obese	  · Constitutional Comments	elderly and ill appearing	  · Eyes	detailed exam	  · Eyes Details	PERRL	  · ENMT	detailed exam	  · ENMT Details	mouth	  · Mouth	moist	  · ENMT Comments	Orally intubated, OGT in situ	  · Neck	detailed exam 	  · Neck Details	supple; no JVD	  · Back	detailed exam 	  · Back Details	normal shape	  · Respiratory	detailed exam	  · Respiratory Details	airway patent; breath sounds equal; good air movement; respirations non-labored	  · Cardiovascular	detailed exam	  · Cardiovascular Details	regular rate and rhythm 	  · Gastrointestinal	detailed exam	  · GI Normal	soft; nontender; no distention; bowel sounds normal	  · Gastrointestinal Comments	(+) flexi-seal stool collection device; diarrhea	  · Genitourinary	detailed exam 	  · Genitourinary Comments	Bowman catheter in situ	  · Extremities	detailed exam 	  · Extremities Details	no clubbing; no cyanosis	  · Vascular	Equal and normal pulses (carotid, femoral, dorsalis pedis) 	  · Neurological	detailed exam	  · Mental Status	sedated but arousable	  · Motor	SNUG spont but minimally	  · Sensory	Grossly intact	  · Skin	detailed exam	  · Skin Details	warm and dry	  · Musculoskeletal	detailed exam	  · Musculoskeletal Details	normal strength	  · Psychiatric	detailed exam	  · Psychiatric Comments	JASON due to intub/sedation	        DVT Prophylaxis: SQ heparin, venodynes    Advanced Directives: DNR  Discussed with: LYLE completed    Visit Information:  45 minutes of Critical Care time spent providing medical care for patient's acute illness/conditions that impairs at least one vital organ system and/or poses a high risk of imminent or life threatening deterioration in the patient's condition.  It includes time spent reviewing labs, radiology, discussing goals of care with patient and/or family, and discussing the case with a multidisciplinary team in an effort to prevent further life threatening deterioration or end organ damage.  This time is independent of any procedures performed.    ** Time is exclusive of billed procedures and/or teaching and/or routine family updates.

## 2020-03-13 NOTE — PROGRESS NOTE ADULT - ASSESSMENT
89 yo M admitted 3/2 with progressive dyspnea and progression to ARDS now know to be COVID-19 positive.  acute hypoxic respiratory failure, ARDS, viral COVID-19 sequelae   requiring high FiO2 and high PEEP - unable to wean down without significant oxygen desaturation.  CRAIG likely from ATN  Shock requiring pressors - improved; potentially hypotension was due to vasodilatory effects of sedative agent  pt remains tenuous and at high risk for deterioration, morbidity and mortality given underlying disease process and degree of respiratory compromise.    Plan at this time is for continued care in ICU  Vent support - NO SAT/SBT given high level of support required  TF  Cefepime, Vancomycin, Zithromax  IVF  Synthroid IV  has been on prednisone chronically - monitor OFF steroids for now given detrimental effect on COVID-19 recovery  low threshold to administer stress-dose hydrocortisone if pt becomes hemodynamically unstable  ID input appreciated  DNR per known wishes - spoke with daughter in [place] suggests that pt would NOT want trach or PEG either.  case d/w pts daughter Tracy Everton this morning and all issues/plans/questions addressed.  Supportive care

## 2020-03-14 LAB
ALBUMIN SERPL ELPH-MCNC: 2 G/DL — LOW (ref 3.3–5)
ALP SERPL-CCNC: 94 U/L — SIGNIFICANT CHANGE UP (ref 40–120)
ALT FLD-CCNC: 70 U/L — SIGNIFICANT CHANGE UP (ref 12–78)
ANION GAP SERPL CALC-SCNC: 4 MMOL/L — LOW (ref 5–17)
AST SERPL-CCNC: 106 U/L — HIGH (ref 15–37)
BASE EXCESS BLDA CALC-SCNC: -0.5 MMOL/L — SIGNIFICANT CHANGE UP (ref -2–2)
BASE EXCESS BLDA CALC-SCNC: -4.4 MMOL/L — LOW (ref -2–2)
BILIRUB SERPL-MCNC: 0.8 MG/DL — SIGNIFICANT CHANGE UP (ref 0.2–1.2)
BLOOD GAS COMMENTS ARTERIAL: SIGNIFICANT CHANGE UP
BUN SERPL-MCNC: 51 MG/DL — HIGH (ref 7–23)
CALCIUM SERPL-MCNC: 7.9 MG/DL — LOW (ref 8.5–10.1)
CHLORIDE SERPL-SCNC: 111 MMOL/L — HIGH (ref 96–108)
CO2 SERPL-SCNC: 26 MMOL/L — SIGNIFICANT CHANGE UP (ref 22–31)
CREAT SERPL-MCNC: 1.07 MG/DL — SIGNIFICANT CHANGE UP (ref 0.5–1.3)
GAS PNL BLDA: SIGNIFICANT CHANGE UP
GLUCOSE SERPL-MCNC: 152 MG/DL — HIGH (ref 70–99)
HCO3 BLDA-SCNC: 23 MMOL/L — SIGNIFICANT CHANGE UP (ref 21–29)
HCO3 BLDA-SCNC: 23 MMOL/L — SIGNIFICANT CHANGE UP (ref 21–29)
HCT VFR BLD CALC: 34.8 % — LOW (ref 39–50)
HGB BLD-MCNC: 11.7 G/DL — LOW (ref 13–17)
HOROWITZ INDEX BLDA+IHG-RTO: SIGNIFICANT CHANGE UP
MAGNESIUM SERPL-MCNC: 2.7 MG/DL — HIGH (ref 1.6–2.6)
MCHC RBC-ENTMCNC: 30.5 PG — SIGNIFICANT CHANGE UP (ref 27–34)
MCHC RBC-ENTMCNC: 33.6 GM/DL — SIGNIFICANT CHANGE UP (ref 32–36)
MCV RBC AUTO: 90.9 FL — SIGNIFICANT CHANGE UP (ref 80–100)
PCO2 BLDA: 35 MMHG — SIGNIFICANT CHANGE UP (ref 32–46)
PCO2 BLDA: 57 MMHG — HIGH (ref 32–46)
PH BLDA: 7.24 — LOW (ref 7.35–7.45)
PH BLDA: 7.43 — SIGNIFICANT CHANGE UP (ref 7.35–7.45)
PHOSPHATE SERPL-MCNC: 3 MG/DL — SIGNIFICANT CHANGE UP (ref 2.5–4.5)
PLATELET # BLD AUTO: 243 K/UL — SIGNIFICANT CHANGE UP (ref 150–400)
PO2 BLDA: 54 MMHG — LOW (ref 74–108)
PO2 BLDA: 70 MMHG — LOW (ref 74–108)
POTASSIUM SERPL-MCNC: 4.7 MMOL/L — SIGNIFICANT CHANGE UP (ref 3.5–5.3)
POTASSIUM SERPL-SCNC: 4.7 MMOL/L — SIGNIFICANT CHANGE UP (ref 3.5–5.3)
PROT SERPL-MCNC: 6.4 GM/DL — SIGNIFICANT CHANGE UP (ref 6–8.3)
RBC # BLD: 3.83 M/UL — LOW (ref 4.2–5.8)
RBC # FLD: 15.6 % — HIGH (ref 10.3–14.5)
SAO2 % BLDA: 89 % — LOW (ref 92–96)
SAO2 % BLDA: 91 % — LOW (ref 92–96)
SODIUM SERPL-SCNC: 141 MMOL/L — SIGNIFICANT CHANGE UP (ref 135–145)
WBC # BLD: 11.8 K/UL — HIGH (ref 3.8–10.5)
WBC # FLD AUTO: 11.8 K/UL — HIGH (ref 3.8–10.5)

## 2020-03-14 PROCEDURE — 99291 CRITICAL CARE FIRST HOUR: CPT

## 2020-03-14 PROCEDURE — 71045 X-RAY EXAM CHEST 1 VIEW: CPT | Mod: 26

## 2020-03-14 RX ORDER — FUROSEMIDE 40 MG
40 TABLET ORAL ONCE
Refills: 0 | Status: COMPLETED | OUTPATIENT
Start: 2020-03-14 | End: 2020-03-14

## 2020-03-14 RX ADMIN — Medication 25 MICROGRAM(S): at 23:00

## 2020-03-14 RX ADMIN — PROPOFOL 18 MICROGRAM(S)/KG/MIN: 10 INJECTION, EMULSION INTRAVENOUS at 06:00

## 2020-03-14 RX ADMIN — PROPOFOL 18 MICROGRAM(S)/KG/MIN: 10 INJECTION, EMULSION INTRAVENOUS at 20:21

## 2020-03-14 RX ADMIN — Medication 40 MILLIGRAM(S): at 13:38

## 2020-03-14 RX ADMIN — Medication 1 PUFF(S): at 01:55

## 2020-03-14 RX ADMIN — PROPOFOL 18 MICROGRAM(S)/KG/MIN: 10 INJECTION, EMULSION INTRAVENOUS at 17:55

## 2020-03-14 RX ADMIN — PROPOFOL 18 MICROGRAM(S)/KG/MIN: 10 INJECTION, EMULSION INTRAVENOUS at 03:38

## 2020-03-14 RX ADMIN — SODIUM CHLORIDE 100 MILLILITER(S): 9 INJECTION, SOLUTION INTRAVENOUS at 03:39

## 2020-03-14 RX ADMIN — Medication 650 MILLIGRAM(S): at 22:29

## 2020-03-14 RX ADMIN — HEPARIN SODIUM 5000 UNIT(S): 5000 INJECTION INTRAVENOUS; SUBCUTANEOUS at 22:28

## 2020-03-14 RX ADMIN — PROPOFOL 18 MICROGRAM(S)/KG/MIN: 10 INJECTION, EMULSION INTRAVENOUS at 13:39

## 2020-03-14 RX ADMIN — ALBUTEROL 2 PUFF(S): 90 AEROSOL, METERED ORAL at 10:41

## 2020-03-14 RX ADMIN — Medication 166.67 MILLIGRAM(S): at 13:38

## 2020-03-14 RX ADMIN — CEFEPIME 100 MILLIGRAM(S): 1 INJECTION, POWDER, FOR SOLUTION INTRAMUSCULAR; INTRAVENOUS at 05:59

## 2020-03-14 RX ADMIN — PANTOPRAZOLE SODIUM 40 MILLIGRAM(S): 20 TABLET, DELAYED RELEASE ORAL at 11:14

## 2020-03-14 RX ADMIN — HEPARIN SODIUM 5000 UNIT(S): 5000 INJECTION INTRAVENOUS; SUBCUTANEOUS at 06:00

## 2020-03-14 RX ADMIN — PROPOFOL 18 MICROGRAM(S)/KG/MIN: 10 INJECTION, EMULSION INTRAVENOUS at 22:29

## 2020-03-14 RX ADMIN — Medication 650 MILLIGRAM(S): at 23:29

## 2020-03-14 RX ADMIN — AZITHROMYCIN 255 MILLIGRAM(S): 500 TABLET, FILM COATED ORAL at 22:28

## 2020-03-14 RX ADMIN — ALBUTEROL 2 PUFF(S): 90 AEROSOL, METERED ORAL at 01:55

## 2020-03-14 RX ADMIN — PROPOFOL 18 MICROGRAM(S)/KG/MIN: 10 INJECTION, EMULSION INTRAVENOUS at 11:14

## 2020-03-14 RX ADMIN — PROPOFOL 18 MICROGRAM(S)/KG/MIN: 10 INJECTION, EMULSION INTRAVENOUS at 08:55

## 2020-03-14 RX ADMIN — CHLORHEXIDINE GLUCONATE 15 MILLILITER(S): 213 SOLUTION TOPICAL at 17:55

## 2020-03-14 RX ADMIN — Medication 1 PUFF(S): at 10:41

## 2020-03-14 RX ADMIN — HEPARIN SODIUM 5000 UNIT(S): 5000 INJECTION INTRAVENOUS; SUBCUTANEOUS at 13:38

## 2020-03-14 RX ADMIN — PROPOFOL 18 MICROGRAM(S)/KG/MIN: 10 INJECTION, EMULSION INTRAVENOUS at 01:00

## 2020-03-14 RX ADMIN — PROPOFOL 18 MICROGRAM(S)/KG/MIN: 10 INJECTION, EMULSION INTRAVENOUS at 15:46

## 2020-03-14 RX ADMIN — CEFEPIME 100 MILLIGRAM(S): 1 INJECTION, POWDER, FOR SOLUTION INTRAMUSCULAR; INTRAVENOUS at 17:55

## 2020-03-14 RX ADMIN — Medication 81 MILLIGRAM(S): at 11:14

## 2020-03-14 RX ADMIN — CHLORHEXIDINE GLUCONATE 15 MILLILITER(S): 213 SOLUTION TOPICAL at 06:00

## 2020-03-14 NOTE — PROGRESS NOTE ADULT - ASSESSMENT
89 yo M admitted 3/2 with progressive dyspnea and progression to ARDS now know to be COVID-19 positive.  acute hypoxic respiratory failure, ARDS, viral COVID-19 sequelae   requiring high FiO2 and high PEEP - unable to wean down without significant oxygen desaturation.  CRAIG likely from ATN  Shock requiring pressors - improved; potentially hypotension was due to vasodilatory effects of sedative agent  pt remains tenuous and at high risk for deterioration, morbidity and mortality given underlying disease process and degree of respiratory compromise.    Plan at this time is for continued care in ICU  Vent support - NO SAT/SBT given high level of support required  Increase PEEP to 20 and potentially 22cm to allow decrease in FiO2 to minimize potential toxicity of oxygen  TF  Cefepime, Vancomycin, Zithromax  IVF  Synthroid IV  has been on prednisone chronically - monitor OFF steroids for now given detrimental effect on COVID-19 recovery  low threshold to administer stress-dose hydrocortisone if pt becomes hemodynamically unstable  ID input appreciated  DNR per known wishes - spoke with daughter in [place] suggests that pt would NOT want trach or PEG either.  case d/w pts daughter Jose M Juan [phone] this morning and all issues/plans/questions addressed.  Supportive care

## 2020-03-14 NOTE — PROGRESS NOTE ADULT - SUBJECTIVE AND OBJECTIVE BOX
89 yo M admitted 3/2 - progressively worsening respiratory distresss and B/L infiltrates progressing to ARDS - now known to be (+) COVID-19    3/11: The patient remains intubated on pressors, on 80% Fio2 and 18 PEEP.  COVID-19 test (+)     above d/w Dr Lamb who has been caring for the patient in ICU    3/12: currently OFF vasopressors but remains intubated - AC 16/450/60% PEEP = 18cm  TF in progress  case discussed with pts daughter in [place] - issues/plans and all questions discussed with her in detail.    3/13: hemodynamics reasonable - remains intubated - sedated - TF in progress.  Unable to titrate PEEP or FiO2 at this time - pt desaturated into low 80's % patrick with decrease in PEEP from 18=>15cm and was therefore immediately returned to PEEP 18cm with subsequent improvement in oxygenation.    3/14: worsening of oxygenation overnight prompted increase in FiO2 to 80% to maintain Sat >92% remains on PEEP 18.  Case d/w pts daughter Jose M Lindsay via telephone [phone] and all questions answered.  Hemodynamics reasonable and creatinine improved - CXR personally reviewed - ETT/OGT ok - B/L infiltrates are significantly worsened since previous study.    Allergies    penicillins (Unknown)      ICU Vital Signs Last 24 Hrs  T(C): 36.7 (14 Mar 2020 08:00), Max: 36.9 (14 Mar 2020 01:00)  T(F): 98 (14 Mar 2020 08:00), Max: 98.5 (14 Mar 2020 04:00)  HR: 77 (14 Mar 2020 11:00) (68 - 92)  BP: 139/58 (14 Mar 2020 10:00) (120/50 - 143/52)  BP(mean): 76 (14 Mar 2020 10:00) (66 - 79)  ABP: 153/58 (14 Mar 2020 11:00) (132/49 - 158/61)  ABP(mean): 85 (14 Mar 2020 11:00) (75 - 93)  RR: 36 (14 Mar 2020 11:00) (24 - 36)  SpO2: 91% (14 Mar 2020 11:00) (87% - 93%)      Mode: AC/ CMV (Assist Control/ Continuous Mandatory Ventilation)  RR (machine): 16  TV (machine): 500  FiO2: 80  PEEP: 18  ITime: 1  MAP: 14  PIP: 31      I&O's Summary    13 Mar 2020 07:01  -  14 Mar 2020 07:00  --------------------------------------------------------  IN: 4326 mL / OUT: 2500 mL / NET: 1826 mL    14 Mar 2020 07:01  -  14 Mar 2020 11:45  --------------------------------------------------------  IN: 0 mL / OUT: 650 mL / NET: -650 mL                              11.7   11.80 )-----------( 243      ( 14 Mar 2020 06:30 )             34.8       03-14    141  |  111<H>  |  51<H>  ----------------------------<  152<H>  4.7   |  26  |  1.07    Ca    7.9<L>      14 Mar 2020 06:30  Phos  3.0     03-14  Mg     2.7     03-14    TPro  6.4  /  Alb  2.0<L>  /  TBili  0.8  /  DBili  x   /  AST  106<H>  /  ALT  70  /  AlkPhos  94  03-14      CAPILLARY BLOOD GLUCOSE          LIVER FUNCTIONS - ( 14 Mar 2020 06:30 )  Alb: 2.0 g/dL / Pro: 6.4 gm/dL / ALK PHOS: 94 U/L / ALT: 70 U/L / AST: 106 U/L / GGT: x               ABG - ( 14 Mar 2020 05:48 )  pH, Arterial: 7.24  pH, Blood: x     /  pCO2: 57    /  pO2: 70    / HCO3: 23    / Base Excess: -4.4  /  SaO2: 91            MEDICATIONS  (STANDING):  ALBUTerol    90 MICROgram(s) HFA Inhaler 2 Puff(s) Inhalation every 6 hours  aspirin  chewable 81 milliGRAM(s) Oral daily  azithromycin  IVPB 500 milliGRAM(s) IV Intermittent every 24 hours  cefepime   IVPB 2000 milliGRAM(s) IV Intermittent every 12 hours  chlorhexidine 0.12% Liquid 15 milliLiter(s) Oral Mucosa every 12 hours  heparin  Injectable 5000 Unit(s) SubCutaneous three times a day  ipratropium 17 MICROgram(s) HFA Inhaler 1 Puff(s) Inhalation every 6 hours  levothyroxine Injectable 25 MICROGram(s) IV Push at bedtime  pantoprazole  Injectable 40 milliGRAM(s) IV Push daily  propofol Infusion 30 MICROgram(s)/kG/Min (18 mL/Hr) IV Continuous <Continuous>  vancomycin  IVPB 1250 milliGRAM(s) IV Intermittent every 24 hours  vancomycin  IVPB        MEDICATIONS  (PRN):  acetaminophen  Suppository .. 650 milliGRAM(s) Rectal every 6 hours PRN Temp greater or equal to 38C (100.4F)  ALBUTerol    90 MICROgram(s) HFA Inhaler 1 Puff(s) Inhalation every 4 hours PRN Shortness of Breath and/or Wheezing    Review of Systems:   · Additional ROS	JASON due to intubation/sedation	    Physical Exam:   · Constitutional	detailed exam	  · Constitutional Details	obese	  · Constitutional Comments	elderly and ill appearing	  · Eyes	detailed exam	  · Eyes Details	PERRL	  · ENMT	detailed exam	  · ENMT Details	mouth	  · Mouth	moist	  · ENMT Comments	Orally intubated, OGT in situ	  · Neck	detailed exam 	  · Neck Details	supple; no JVD	  · Back	detailed exam 	  · Back Details	normal shape	  · Respiratory	detailed exam	  · Respiratory Details	airway patent; breath sounds equal; good air movement; respirations non-labored	  · Cardiovascular	detailed exam	  · Cardiovascular Details	regular rate and rhythm 	  · Gastrointestinal	detailed exam	  · GI Normal	soft; nontender; no distention; bowel sounds normal	  · Gastrointestinal Comments	(+) flexi-seal stool collection device; diarrhea	  · Genitourinary	detailed exam 	  · Genitourinary Comments	Bowman catheter in situ	  · Extremities	detailed exam 	  · Extremities Details	no clubbing; no cyanosis	  · Vascular	Equal and normal pulses (carotid, femoral, dorsalis pedis) 	  · Neurological	detailed exam	  · Mental Status	sedated but arousable	  · Motor	SUNG spont but minimally	  · Sensory	Grossly intact	  · Skin	detailed exam	  · Skin Details	warm and dry	  · Musculoskeletal	detailed exam	  · Musculoskeletal Details	normal strength	  · Psychiatric	detailed exam	  · Psychiatric Comments	JASON due to intub/sedation	        DVT Prophylaxis: SQ heparin, venodynes    Advanced Directives: DNR  Discussed with: [facility] completed    Visit Information:  45 minutes of Critical Care time spent providing medical care for patient's acute illness/conditions that impairs at least one vital organ system and/or poses a high risk of imminent or life threatening deterioration in the patient's condition.  It includes time spent reviewing labs, radiology, discussing goals of care with patient and/or family, and discussing the case with a multidisciplinary team in an effort to prevent further life threatening deterioration or end organ damage.  This time is independent of any procedures performed.    ** Time is exclusive of billed procedures and/or teaching and/or routine family updates.

## 2020-03-15 LAB
ALBUMIN SERPL ELPH-MCNC: 1.7 G/DL — LOW (ref 3.3–5)
ALP SERPL-CCNC: 99 U/L — SIGNIFICANT CHANGE UP (ref 40–120)
ALT FLD-CCNC: 63 U/L — SIGNIFICANT CHANGE UP (ref 12–78)
ANION GAP SERPL CALC-SCNC: 6 MMOL/L — SIGNIFICANT CHANGE UP (ref 5–17)
AST SERPL-CCNC: 81 U/L — HIGH (ref 15–37)
BASE EXCESS BLDA CALC-SCNC: -2.1 MMOL/L — LOW (ref -2–2)
BASE EXCESS BLDA CALC-SCNC: -8.5 MMOL/L — LOW (ref -2–2)
BILIRUB SERPL-MCNC: 1.2 MG/DL — SIGNIFICANT CHANGE UP (ref 0.2–1.2)
BLOOD GAS COMMENTS ARTERIAL: SIGNIFICANT CHANGE UP
BUN SERPL-MCNC: 65 MG/DL — HIGH (ref 7–23)
CALCIUM SERPL-MCNC: 7.7 MG/DL — LOW (ref 8.5–10.1)
CHLORIDE SERPL-SCNC: 111 MMOL/L — HIGH (ref 96–108)
CO2 SERPL-SCNC: 23 MMOL/L — SIGNIFICANT CHANGE UP (ref 22–31)
CREAT SERPL-MCNC: 1.56 MG/DL — HIGH (ref 0.5–1.3)
CULTURE RESULTS: SIGNIFICANT CHANGE UP
CULTURE RESULTS: SIGNIFICANT CHANGE UP
GAS PNL BLDA: SIGNIFICANT CHANGE UP
GLUCOSE SERPL-MCNC: 132 MG/DL — HIGH (ref 70–99)
HCO3 BLDA-SCNC: 20 MMOL/L — LOW (ref 21–29)
HCO3 BLDA-SCNC: 21 MMOL/L — SIGNIFICANT CHANGE UP (ref 21–29)
HCO3 BLDA-SCNC: 23 MMOL/L — SIGNIFICANT CHANGE UP (ref 21–29)
HCT VFR BLD CALC: 32.2 % — LOW (ref 39–50)
HGB BLD-MCNC: 11.1 G/DL — LOW (ref 13–17)
MAGNESIUM SERPL-MCNC: 2.4 MG/DL — SIGNIFICANT CHANGE UP (ref 1.6–2.6)
MCHC RBC-ENTMCNC: 30.1 PG — SIGNIFICANT CHANGE UP (ref 27–34)
MCHC RBC-ENTMCNC: 34.5 GM/DL — SIGNIFICANT CHANGE UP (ref 32–36)
MCV RBC AUTO: 87.3 FL — SIGNIFICANT CHANGE UP (ref 80–100)
PCO2 BLDA: 28 MMHG — LOW (ref 32–46)
PCO2 BLDA: 33 MMHG — SIGNIFICANT CHANGE UP (ref 32–46)
PCO2 BLDA: 63 MMHG — HIGH (ref 32–46)
PH BLDA: 7.14 — CRITICAL LOW (ref 7.35–7.45)
PH BLDA: 7.46 — HIGH (ref 7.35–7.45)
PH BLDA: 7.47 — HIGH (ref 7.35–7.45)
PHOSPHATE SERPL-MCNC: 1 MG/DL — CRITICAL LOW (ref 2.5–4.5)
PLATELET # BLD AUTO: 210 K/UL — SIGNIFICANT CHANGE UP (ref 150–400)
PO2 BLDA: 54 MMHG — LOW (ref 74–108)
PO2 BLDA: 65 MMHG — LOW (ref 74–108)
PO2 BLDA: 85 MMHG — SIGNIFICANT CHANGE UP (ref 74–108)
POTASSIUM SERPL-MCNC: 4.2 MMOL/L — SIGNIFICANT CHANGE UP (ref 3.5–5.3)
POTASSIUM SERPL-SCNC: 4.2 MMOL/L — SIGNIFICANT CHANGE UP (ref 3.5–5.3)
PROT SERPL-MCNC: 6 GM/DL — SIGNIFICANT CHANGE UP (ref 6–8.3)
RBC # BLD: 3.69 M/UL — LOW (ref 4.2–5.8)
RBC # FLD: 15.2 % — HIGH (ref 10.3–14.5)
SAO2 % BLDA: 90 % — LOW (ref 92–96)
SAO2 % BLDA: 92 % — SIGNIFICANT CHANGE UP (ref 92–96)
SAO2 % BLDA: 94 % — SIGNIFICANT CHANGE UP (ref 92–96)
SODIUM SERPL-SCNC: 140 MMOL/L — SIGNIFICANT CHANGE UP (ref 135–145)
SPECIMEN SOURCE: SIGNIFICANT CHANGE UP
SPECIMEN SOURCE: SIGNIFICANT CHANGE UP
WBC # BLD: 15.41 K/UL — HIGH (ref 3.8–10.5)
WBC # FLD AUTO: 15.41 K/UL — HIGH (ref 3.8–10.5)

## 2020-03-15 PROCEDURE — 99291 CRITICAL CARE FIRST HOUR: CPT

## 2020-03-15 PROCEDURE — 99292 CRITICAL CARE ADDL 30 MIN: CPT

## 2020-03-15 RX ORDER — HYDROMORPHONE HYDROCHLORIDE 2 MG/ML
2 INJECTION INTRAMUSCULAR; INTRAVENOUS; SUBCUTANEOUS
Refills: 0 | Status: DISCONTINUED | OUTPATIENT
Start: 2020-03-15 | End: 2020-03-16

## 2020-03-15 RX ORDER — CHLORHEXIDINE GLUCONATE 213 G/1000ML
15 SOLUTION TOPICAL EVERY 12 HOURS
Refills: 0 | Status: DISCONTINUED | OUTPATIENT
Start: 2020-03-15 | End: 2020-03-16

## 2020-03-15 RX ORDER — CHLORHEXIDINE GLUCONATE 213 G/1000ML
15 SOLUTION TOPICAL EVERY 12 HOURS
Refills: 0 | Status: DISCONTINUED | OUTPATIENT
Start: 2020-03-15 | End: 2020-03-15

## 2020-03-15 RX ORDER — HYDROCORTISONE 20 MG
100 TABLET ORAL EVERY 8 HOURS
Refills: 0 | Status: DISCONTINUED | OUTPATIENT
Start: 2020-03-15 | End: 2020-03-15

## 2020-03-15 RX ORDER — FUROSEMIDE 40 MG
20 TABLET ORAL EVERY 6 HOURS
Refills: 0 | Status: DISCONTINUED | OUTPATIENT
Start: 2020-03-15 | End: 2020-03-15

## 2020-03-15 RX ORDER — PHENYLEPHRINE HYDROCHLORIDE 10 MG/ML
0.5 INJECTION INTRAVENOUS
Qty: 40 | Refills: 0 | Status: DISCONTINUED | OUTPATIENT
Start: 2020-03-15 | End: 2020-03-16

## 2020-03-15 RX ORDER — SODIUM,POTASSIUM PHOSPHATES 278-250MG
1 POWDER IN PACKET (EA) ORAL EVERY 4 HOURS
Refills: 0 | Status: DISCONTINUED | OUTPATIENT
Start: 2020-03-15 | End: 2020-03-15

## 2020-03-15 RX ADMIN — CEFEPIME 100 MILLIGRAM(S): 1 INJECTION, POWDER, FOR SOLUTION INTRAMUSCULAR; INTRAVENOUS at 06:09

## 2020-03-15 RX ADMIN — Medication 1 PUFF(S): at 15:18

## 2020-03-15 RX ADMIN — PHENYLEPHRINE HYDROCHLORIDE 18.7 MICROGRAM(S)/KG/MIN: 10 INJECTION INTRAVENOUS at 20:58

## 2020-03-15 RX ADMIN — Medication 20 MILLIGRAM(S): at 14:37

## 2020-03-15 RX ADMIN — ALBUTEROL 2 PUFF(S): 90 AEROSOL, METERED ORAL at 15:16

## 2020-03-15 RX ADMIN — CHLORHEXIDINE GLUCONATE 15 MILLILITER(S): 213 SOLUTION TOPICAL at 06:10

## 2020-03-15 RX ADMIN — HYDROMORPHONE HYDROCHLORIDE 2 MILLIGRAM(S): 2 INJECTION INTRAMUSCULAR; INTRAVENOUS; SUBCUTANEOUS at 21:30

## 2020-03-15 RX ADMIN — Medication 1 PACKET(S): at 10:00

## 2020-03-15 RX ADMIN — ALBUTEROL 2 PUFF(S): 90 AEROSOL, METERED ORAL at 15:17

## 2020-03-15 RX ADMIN — PANTOPRAZOLE SODIUM 40 MILLIGRAM(S): 20 TABLET, DELAYED RELEASE ORAL at 14:50

## 2020-03-15 RX ADMIN — Medication 166.67 MILLIGRAM(S): at 14:37

## 2020-03-15 RX ADMIN — HEPARIN SODIUM 5000 UNIT(S): 5000 INJECTION INTRAVENOUS; SUBCUTANEOUS at 06:10

## 2020-03-15 RX ADMIN — PROPOFOL 18 MICROGRAM(S)/KG/MIN: 10 INJECTION, EMULSION INTRAVENOUS at 03:15

## 2020-03-15 RX ADMIN — Medication 1 PACKET(S): at 14:50

## 2020-03-15 RX ADMIN — Medication 650 MILLIGRAM(S): at 06:11

## 2020-03-15 RX ADMIN — PROPOFOL 18 MICROGRAM(S)/KG/MIN: 10 INJECTION, EMULSION INTRAVENOUS at 06:19

## 2020-03-15 RX ADMIN — PROPOFOL 18 MICROGRAM(S)/KG/MIN: 10 INJECTION, EMULSION INTRAVENOUS at 00:45

## 2020-03-15 RX ADMIN — HEPARIN SODIUM 5000 UNIT(S): 5000 INJECTION INTRAVENOUS; SUBCUTANEOUS at 14:37

## 2020-03-15 RX ADMIN — HYDROMORPHONE HYDROCHLORIDE 2 MILLIGRAM(S): 2 INJECTION INTRAMUSCULAR; INTRAVENOUS; SUBCUTANEOUS at 21:06

## 2020-03-15 RX ADMIN — Medication 1 PUFF(S): at 15:17

## 2020-03-15 RX ADMIN — Medication 81 MILLIGRAM(S): at 14:50

## 2020-03-15 NOTE — PROGRESS NOTE ADULT - SUBJECTIVE AND OBJECTIVE BOX
89 yo M admitted 3/2 - progressively worsening respiratory distresss and B/L infiltrates progressing to ARDS - now known to be (+) COVID-19    3/11: The patient remains intubated on pressors, on 80% Fio2 and 18 PEEP.  COVID-19 test (+)     above d/w Dr Lamb who has been caring for the patient in ICU    3/12: currently OFF vasopressors but remains intubated - AC 16/450/60% PEEP = 18cm  TF in progress  case discussed with pts daughter in [place] - issues/plans and all questions discussed with her in detail.    3/13: hemodynamics reasonable - remains intubated - sedated - TF in progress.  Unable to titrate PEEP or FiO2 at this time - pt desaturated into low 80's % patrick with decrease in PEEP from 18=>15cm and was therefore immediately returned to PEEP 18cm with subsequent improvement in oxygenation.    3/14: worsening of oxygenation overnight prompted increase in FiO2 to 80% to maintain Sat >92% remains on PEEP 18.  Case d/w pts daughter Jose M Lindsay via telephone [phone] and all questions answered.  Hemodynamics reasonable and creatinine improved - CXR personally reviewed - ETT/OGT ok - B/L infiltrates are significantly worsened since previous study.      3/15: persistent hypoxia noted despite vent settings noted - hemodynamics reasonable - good urine output overnight -         Allergies    penicillins (Unknown)      ICU Vital Signs Last 24 Hrs  T(C): 36.2 (15 Mar 2020 13:00), Max: 38.3 (15 Mar 2020 01:00)  T(F): 97.1 (15 Mar 2020 13:00), Max: 101 (15 Mar 2020 01:00)  HR: 105 (15 Mar 2020 13:00) (70 - 105)  BP: 115/48 (15 Mar 2020 12:00) (91/40 - 135/48)  BP(mean): 57 (15 Mar 2020 12:00) (51 - 94)  ABP: 139/55 (15 Mar 2020 13:00) (85/35 - 165/56)  ABP(mean): 80 (15 Mar 2020 13:00) (48 - 92)  RR: 39 (15 Mar 2020 13:00) (14 - 39)  SpO2: 96% (15 Mar 2020 13:00) (85% - 96%)      Mode: AC/ CMV (Assist Control/ Continuous Mandatory Ventilation)  RR (machine): 16  FiO2: 100  PEEP: 12  PC: 30  PIP: 42      I&O's Summary    14 Mar 2020 07:01  -  15 Mar 2020 07:00  --------------------------------------------------------  IN: 1139 mL / OUT: 1800 mL / NET: -661 mL                              11.1   15.41 )-----------( 210      ( 15 Mar 2020 06:45 )             32.2       03-15    140  |  111<H>  |  65<H>  ----------------------------<  132<H>  4.2   |  23  |  1.56<H>    Ca    7.7<L>      15 Mar 2020 06:45  Phos  1.0     03-15  Mg     2.4     03-15    TPro  6.0  /  Alb  1.7<L>  /  TBili  1.2  /  DBili  x   /  AST  81<H>  /  ALT  63  /  AlkPhos  99  03-15      LIVER FUNCTIONS - ( 15 Mar 2020 06:45 )  Alb: 1.7 g/dL / Pro: 6.0 gm/dL / ALK PHOS: 99 U/L / ALT: 63 U/L / AST: 81 U/L / GGT: x               ABG - ( 15 Mar 2020 10:44 )  pH, Arterial: 7.47  pH, Blood: x     /  pCO2: 28    /  pO2: 65    / HCO3: 20    / Base Excess: -2.1  /  SaO2: 94            MEDICATIONS  (STANDING):  ALBUTerol    90 MICROgram(s) HFA Inhaler 2 Puff(s) Inhalation every 6 hours  aspirin  chewable 81 milliGRAM(s) Oral daily  azithromycin  IVPB 500 milliGRAM(s) IV Intermittent every 24 hours  cefepime   IVPB 2000 milliGRAM(s) IV Intermittent every 12 hours  chlorhexidine 0.12% Liquid 15 milliLiter(s) Oral Mucosa every 12 hours  heparin  Injectable 5000 Unit(s) SubCutaneous three times a day  ipratropium 17 MICROgram(s) HFA Inhaler 1 Puff(s) Inhalation every 6 hours  levothyroxine Injectable 25 MICROGram(s) IV Push at bedtime  pantoprazole  Injectable 40 milliGRAM(s) IV Push daily  potassium phosphate / sodium phosphate powder 1 Packet(s) Oral every 4 hours  propofol Infusion 30 MICROgram(s)/kG/Min (18 mL/Hr) IV Continuous <Continuous>  vancomycin  IVPB 1250 milliGRAM(s) IV Intermittent every 24 hours  vancomycin  IVPB        MEDICATIONS  (PRN):  acetaminophen  Suppository .. 650 milliGRAM(s) Rectal every 6 hours PRN Temp greater or equal to 38C (100.4F)  ALBUTerol    90 MICROgram(s) HFA Inhaler 1 Puff(s) Inhalation every 4 hours PRN Shortness of Breath and/or Wheezing      Review of Systems:   · Additional ROS	JASON due to intubation/sedation	    Physical Exam:   · Constitutional	detailed exam	  · Constitutional Details	obese	  · Constitutional Comments	elderly and ill appearing	  · Eyes	detailed exam	  · Eyes Details	PERRL	  · ENMT	detailed exam	  · ENMT Details	mouth	  · Mouth	moist	  · ENMT Comments	Orally intubated, OGT in situ	  · Neck	detailed exam 	  · Neck Details	supple; no JVD	  · Back	detailed exam 	  · Back Details	normal shape	  · Respiratory	detailed exam	  · Respiratory Details	airway patent; breath sounds equal; good air movement; respirations non-labored	  · Cardiovascular	detailed exam	  · Cardiovascular Details	regular rate and rhythm 	  · Gastrointestinal	detailed exam	  · GI Normal	soft; nontender; no distention; bowel sounds normal	  · Gastrointestinal Comments	(+) flexi-seal stool collection device; diarrhea	  · Genitourinary	detailed exam 	  · Genitourinary Comments	Bowman catheter in situ	  · Extremities	detailed exam 	  · Extremities Details	no clubbing; no cyanosis	  · Vascular	Equal and normal pulses (carotid, femoral, dorsalis pedis) 	  · Neurological	detailed exam	  · Mental Status	sedated but arousable	  · Motor	SUNG spont but minimally	  · Sensory	Grossly intact	  · Skin	detailed exam	  · Skin Details	warm and dry	  · Musculoskeletal	detailed exam	  · Musculoskeletal Details	normal strength	  · Psychiatric	detailed exam	  · Psychiatric Comments	JASON due to intub/sedation	        DVT Prophylaxis: SQ heparin, venodynes    Advanced Directives: DNR  Discussed with: LYLE completed    Visit Information:  45 minutes of Critical Care time spent providing medical care for patient's acute illness/conditions that impairs at least one vital organ system and/or poses a high risk of imminent or life threatening deterioration in the patient's condition.  It includes time spent reviewing labs, radiology, discussing goals of care with patient and/or family, and discussing the case with a multidisciplinary team in an effort to prevent further life threatening deterioration or end organ damage.  This time is independent of any procedures performed.    ** Time is exclusive of billed procedures and/or teaching and/or routine family updates.

## 2020-03-15 NOTE — PROGRESS NOTE ADULT - ASSESSMENT
89 yo M admitted 3/2 with progressive dyspnea and progression to ARDS now know to be COVID-19 positive.  acute hypoxic respiratory failure, ARDS, viral COVID-19 sequelae   requiring high FiO2 and high PEEP - unable to wean down without significant oxygen desaturation.  CRAIG likely from [facility]  Shock requiring pressors - improved; potentially hypotension was due to vasodilatory effects of sedative agent  pt remains tenuous and at high risk for deterioration, morbidity and mortality given underlying disease process and degree of respiratory compromise.    Plan at this time is for continued care in ICU  Vent support - NO SAT/SBT given high level of support required  Increase PEEP 22cm to allow decrease in FiO2 to minimize potential toxicity of oxygen  AC 12/450/100% P=01ozG1C  TF  Cefepime, Vancomycin, Zithromax  D/C IVF - diuresis as appropriate  Synthroid IV  has been on prednisone chronically - monitor OFF steroids for now given detrimental effect on COVID-19 recovery  low threshold to administer stress-dose hydrocortisone if pt becomes hemodynamically unstable  ID input appreciated  DNR per known wishes - spoke with daughter in [place] suggests that pt would NOT want trach or PEG either.  case d/w pts daughter Jose M Juan [phone] this morning and all issues/plans/questions addressed.  Supportive care

## 2020-03-15 NOTE — PROGRESS NOTE ADULT - SUBJECTIVE AND OBJECTIVE BOX
pt seen multiple times over the course of the day  ongoing manamgent discussions with bedside nurse and respiratory therapist    pt with worsening hypoxia despite adjustments to ventilator, multiple ABG's reviewed.     AC 16/450/100% PEEP 22cm     pt also becoming hypotensive.    Given hydrocortisone 100mg IVP x 1 as pt had been on chronic steroids (low dose prednisone daily)    started on phenylephrine but poorly responding.    Case discussed in detail with pts HCP/daughter Jose M Juan [phone]    issues, prognosis and potential options for additional care discussed in detail.    Discussed central line placement, additional pressor agents, as well as pts QOL and known wishes.    Given his dismal prognosis for survival let alone meaningful recovery, she and her family decided that they do NOT want additional therapies to be applied.  In fact if the patient would NOT get back to his lifestyle and QOL that he had prior to this hospitalization, then they do NOT want to prolong his potential discomfort or suffering any longer and want to CAP the pressor agent at its current rate, stop all interventions not directly related to comfort, and administer analgesics around the clock to ensure he is comfortable, understanding that he will NOT survive this ordeal.    DNR is already in place.    A) irreversible ARDS and shock due to COVID-19 infection     acute hypoxic respiratory failure      dismal prognosis for survival let alone meaningful recovery    P) Cap pressor at current rate, continue vent support, diprivan.      No further labs or studies       Dilaudid 2mg IVP q3h RTC    DNR in place    CRITICAL CARE TIME SPENT:  additional 30 minutes of critical care time spent providing medical care for patient's acute illness/conditions that impairs at least one vital organ system and/or poses a high risk of imminent or life threatening deterioration in the patient's condition. It includes time spent evaluating and treating the patient's acute illness as well as time spent reviewing labs, radiology, discussing goals of care with patient and/or patient's family, and discussing the case with a multidisciplinary team in an effort to prevent further life threatening deterioration or end organ damage. This time is independent of any procedures performed.

## 2020-03-16 VITALS
OXYGEN SATURATION: 93 % | SYSTOLIC BLOOD PRESSURE: 75 MMHG | HEART RATE: 69 BPM | RESPIRATION RATE: 22 BRPM | DIASTOLIC BLOOD PRESSURE: 56 MMHG

## 2020-03-16 RX ADMIN — PHENYLEPHRINE HYDROCHLORIDE 18.7 MICROGRAM(S)/KG/MIN: 10 INJECTION INTRAVENOUS at 00:17

## 2020-03-16 RX ADMIN — HYDROMORPHONE HYDROCHLORIDE 2 MILLIGRAM(S): 2 INJECTION INTRAMUSCULAR; INTRAVENOUS; SUBCUTANEOUS at 00:49

## 2020-03-16 RX ADMIN — HYDROMORPHONE HYDROCHLORIDE 2 MILLIGRAM(S): 2 INJECTION INTRAMUSCULAR; INTRAVENOUS; SUBCUTANEOUS at 00:16

## 2020-03-16 NOTE — PROGRESS NOTE ADULT - SUBJECTIVE AND OBJECTIVE BOX
EDRS  AFFIRMATION    [id number]    :Travis Juan  Mar-     /Personal Invalid/Medical Valid/Not Registered/Unsigned/Certified/NA/NA/Personal Pending/ICD Coding Required  Affirmations     Authentication successful.

## 2020-03-16 NOTE — PROGRESS NOTE ADULT - REASON FOR ADMISSION
Acute hypoxic respiratory failure 2/2 CAP
Acute hypoxic respiratory failure 2/2  suspected CAP
Acute hypoxic respiratory failure 2/2 CAP
Acute hypoxic respiratory failure secondary to CAP, CRAIG and thrombocytopenia
Acute hypoxic respiratory failure 2/2 CAP

## 2020-03-16 NOTE — DISCHARGE NOTE FOR THE EXPIRED PATIENT - NS PATIENT DEATH CRITERIA
No pulse no respiration off vent/Patient is dead based on Cardiopulmonary criteria including absent breath sounds, pulselessness and/or asystole

## 2020-03-16 NOTE — DISCHARGE NOTE FOR THE EXPIRED PATIENT - HOSPITAL COURSE
89 Hx HTN HLD CAD hypothyroidism PMR  admitted 3/2  with fever cough and fatigue - progressively worsening respiratory distresss and B/L infiltrates progressing to ARDS  intubated 3/10  with a pressor dependent shock state and CRAIG  (+) COVID-19.  Lymphopenia and high inflammatory markers     He actually improved on the 12th with improved oxygenation and renal fx.  Came off pressors     His oxygenation again worsened on the 14th into the 15th and he again required pressors.  He was DNR through out all of this.  He was placed on a comfort tract on 3/15.  His daughter visited with him on that day.  On the 15th into the16th he had progressive hypoxemia and shock with MSOF.    Pronounced at 2:41am   Daughters Tracy and Jose M informed,

## 2020-03-17 LAB — SARS-COV-2 RNA SPEC QL NAA+PROBE: SIGNIFICANT CHANGE UP

## 2020-03-23 NOTE — CDI QUERY NOTE - NSCDIOTHERTXTBX_GEN_ALL_CORE_HH
Clinical documentation from 3/4-3/8 indicates that this patient has ______sepsis.  Pt presented with PNA, Blood cultures drawn at time of admission were positive for Gram Pos cocci in pairs______. Please clarify if sepsis was ruled in or ruled out. IF sepsis was ruled in, please specify if sepsis was present at the time of admission:  A. sepsis ruled in, present on admission  B. sepsis ruled in, not present on admission  C. sepsis ruled out  D. other (specify)/not clinically significant    Present on Admission:  Was the severity of the condition present on admission?  If so, please document in the chart that “(the condition) was present on admission.” In responding to this request, please exercise your independent professional judgment.  The fact that a question is asked does not imply that any particular answer is desired or expected. Documentation clarification is required for compliance, accuracy in coding and billing, and reporting severity of illness, quality data   and risk of mortality.

## 2020-03-30 NOTE — CHART NOTE - NSCHARTNOTEFT_GEN_A_CORE
in clarification of previous assessment/diagnoses:    89 yo M admitted 3/2 with progressive dyspnea and progression to ARDS now know to be COVID-19 positive.  acute hypoxic respiratory failure, ARDS, viral COVID-19 sequelae   requiring high FiO2 and high PEEP - unable to wean down without significant oxygen desaturation.  CRAIG likely from [facility]    COVID-19 viral sepsis and septic shock requiring pressors in clarification of previous assessment/diagnoses:    89 yo M admitted 3/2 with progressive dyspnea and progression to ARDS now know to be COVID-19 positive.  acute hypoxic respiratory failure, ARDS, viral COVID-19 sequelae   requiring high FiO2 and high PEEP - unable to wean down without significant oxygen desaturation.  CRAIG likely from [facility]    COVID-19 viral sepsis and septic shock requiring pressors - was not POA but developed as complication of progressive disease process.

## 2020-04-13 ENCOUNTER — RX RENEWAL (OUTPATIENT)
Age: 85
End: 2020-04-13

## 2020-04-13 RX ORDER — AMLODIPINE BESYLATE 2.5 MG/1
2.5 TABLET ORAL
Qty: 90 | Refills: 1 | Status: ACTIVE | COMMUNITY
Start: 2019-08-09 | End: 1900-01-01

## 2025-05-23 NOTE — ED ADULT NURSE NOTE - NS ED NOTE ABUSE RESPONSE YN
pt a&ox4 ambulatory at baseline pmhx schizophrenia, opioid use, c/o chest pain for the past day and feeling cold. pt denies shortness of breath. able to move all extremities, pt normal sinus on the monitor. no edema noted. pt in police custody and is shackled to the bed. side rails up and pt given food, per md order. pt breathing with equal and bilateral chest rise Yes

## 2025-06-25 NOTE — ED PROVIDER NOTE - CPE EDP GASTRO NORM
